# Patient Record
Sex: FEMALE | Race: WHITE | Employment: UNEMPLOYED | URBAN - METROPOLITAN AREA
[De-identification: names, ages, dates, MRNs, and addresses within clinical notes are randomized per-mention and may not be internally consistent; named-entity substitution may affect disease eponyms.]

---

## 2020-05-27 LAB — SARS-COV-2 RNA RESP QL NAA+PROBE: NOT DETECTED

## 2020-05-28 ENCOUNTER — HISTORICAL (OUTPATIENT)
Dept: ADMINISTRATIVE | Facility: HOSPITAL | Age: 35
End: 2020-05-28

## 2020-05-28 LAB
ABS NEUT (OLG): 1.16 X10(3)/MCL (ref 2.1–9.2)
ALBUMIN SERPL-MCNC: 3.3 GM/DL (ref 3.5–5)
ALBUMIN/GLOB SERPL: 1.1 RATIO (ref 1.1–2)
ALP SERPL-CCNC: 105 UNIT/L (ref 40–150)
ALT SERPL-CCNC: 9 UNIT/L (ref 0–55)
ANISOCYTOSIS BLD QL SMEAR: 2
APTT PPP: 30.5 SECOND(S) (ref 23.2–33.7)
AST SERPL-CCNC: 17 UNIT/L (ref 5–34)
BILIRUB SERPL-MCNC: 0.2 MG/DL
BILIRUBIN DIRECT+TOT PNL SERPL-MCNC: 0 MG/DL (ref 0–0.8)
BILIRUBIN DIRECT+TOT PNL SERPL-MCNC: 0.2 MG/DL (ref 0–0.5)
BUN SERPL-MCNC: 4.7 MG/DL (ref 7–18.7)
CALCIUM SERPL-MCNC: 8.4 MG/DL (ref 8.4–10.2)
CHLORIDE SERPL-SCNC: 102 MMOL/L (ref 98–107)
CO2 SERPL-SCNC: 29 MMOL/L (ref 22–29)
CREAT SERPL-MCNC: 0.53 MG/DL (ref 0.55–1.02)
ERYTHROCYTE [DISTWIDTH] IN BLOOD BY AUTOMATED COUNT: 11.1 % (ref 11.5–17)
GLOBULIN SER-MCNC: 3 GM/DL (ref 2.4–3.5)
GLUCOSE SERPL-MCNC: 81 MG/DL (ref 74–100)
HCT VFR BLD AUTO: 37.7 % (ref 37–47)
HGB BLD-MCNC: 11.8 GM/DL (ref 12–16)
INR PPP: 1 (ref 0–1.3)
LYMPHOCYTES NFR BLD MANUAL: 57 % (ref 13–40)
MACROCYTES BLD QL SMEAR: 2 /MCL
MCH RBC QN AUTO: 34.9 PG (ref 27–31)
MCHC RBC AUTO-ENTMCNC: 31.3 GM/DL (ref 33–36)
MCV RBC AUTO: 111.5 FL (ref 80–94)
MONOCYTES NFR BLD MANUAL: 6 % (ref 2–11)
NEUTROPHILS NFR BLD MANUAL: 37 % (ref 47–80)
PLATELET # BLD AUTO: 189 X10(3)/MCL (ref 130–400)
PLATELET # BLD EST: NORMAL 10*3/UL
PMV BLD AUTO: 9.3 FL (ref 7.4–10.4)
POTASSIUM SERPL-SCNC: 3.4 MMOL/L (ref 3.5–5.1)
PROT SERPL-MCNC: 6.3 GM/DL (ref 6.4–8.3)
PROTHROMBIN TIME: 12.6 SECOND(S) (ref 11.1–13.7)
RBC # BLD AUTO: 3.38 X10(6)/MCL (ref 4.2–5.4)
SODIUM SERPL-SCNC: 137 MMOL/L (ref 136–145)
WBC # SPEC AUTO: 3.6 X10(3)/MCL (ref 4.5–11.5)

## 2022-03-28 ENCOUNTER — HOSPITAL ENCOUNTER (INPATIENT)
Facility: HOSPITAL | Age: 37
LOS: 13 days | Discharge: HOME | DRG: 441 | End: 2022-04-10
Attending: INTERNAL MEDICINE | Admitting: STUDENT IN AN ORGANIZED HEALTH CARE EDUCATION/TRAINING PROGRAM
Payer: MEDICAID

## 2022-03-28 DIAGNOSIS — K72.00 ACUTE LIVER FAILURE: ICD-10-CM

## 2022-03-28 DIAGNOSIS — J96.01 ACUTE HYPOXEMIC RESPIRATORY FAILURE: ICD-10-CM

## 2022-03-28 DIAGNOSIS — R10.84 GENERALIZED ABDOMINAL PAIN: ICD-10-CM

## 2022-03-28 DIAGNOSIS — F11.20 OPIOID USE DISORDER, SEVERE, ON MAINTENANCE THERAPY: Chronic | ICD-10-CM

## 2022-03-28 DIAGNOSIS — G93.41 ENCEPHALOPATHY, METABOLIC: ICD-10-CM

## 2022-03-28 DIAGNOSIS — I50.41 ACUTE COMBINED SYSTOLIC AND DIASTOLIC CONGESTIVE HEART FAILURE: ICD-10-CM

## 2022-03-28 DIAGNOSIS — F19.10 POLYSUBSTANCE ABUSE: Chronic | ICD-10-CM

## 2022-03-28 DIAGNOSIS — K72.00 ACUTE LIVER FAILURE WITHOUT HEPATIC COMA: ICD-10-CM

## 2022-03-28 DIAGNOSIS — R00.0 SINUS TACHYCARDIA: ICD-10-CM

## 2022-03-28 DIAGNOSIS — S36.119A HEPATIC TRAUMA, INITIAL ENCOUNTER: ICD-10-CM

## 2022-03-28 DIAGNOSIS — I50.9 CONGESTIVE HEART FAILURE, UNSPECIFIED HF CHRONICITY, UNSPECIFIED HEART FAILURE TYPE: ICD-10-CM

## 2022-03-28 DIAGNOSIS — Z91.89 AT RISK FOR PROLONGED QT INTERVAL SYNDROME: ICD-10-CM

## 2022-03-28 DIAGNOSIS — K86.1 CHRONIC BILIARY PANCREATITIS: ICD-10-CM

## 2022-03-28 DIAGNOSIS — R62.7 FAILURE TO THRIVE IN ADULT: ICD-10-CM

## 2022-03-28 DIAGNOSIS — S36.119S: ICD-10-CM

## 2022-03-28 DIAGNOSIS — I50.9 CHF (CONGESTIVE HEART FAILURE): ICD-10-CM

## 2022-03-28 PROBLEM — F11.10 OPIOID ABUSE: Status: ACTIVE | Noted: 2022-03-28

## 2022-03-28 PROBLEM — I47.9 PAROXYSMAL TACHYCARDIA: Status: ACTIVE | Noted: 2022-03-28

## 2022-03-28 PROBLEM — F11.10 OPIOID ABUSE: Chronic | Status: ACTIVE | Noted: 2022-03-28

## 2022-03-28 PROBLEM — K56.609 SBO (SMALL BOWEL OBSTRUCTION): Status: ACTIVE | Noted: 2022-03-28

## 2022-03-28 LAB
ALBUMIN SERPL BCP-MCNC: 3.1 G/DL (ref 3.5–5.2)
ALP SERPL-CCNC: 882 U/L (ref 55–135)
ALT SERPL W/O P-5'-P-CCNC: 71 U/L (ref 10–44)
AMMONIA PLAS-SCNC: 51 UMOL/L (ref 10–50)
AMPHET+METHAMPHET UR QL: NEGATIVE
ANION GAP SERPL CALC-SCNC: 9 MMOL/L (ref 8–16)
APAP SERPL-MCNC: <3 UG/ML (ref 10–20)
AST SERPL-CCNC: 102 U/L (ref 10–40)
B-HCG UR QL: NEGATIVE
BARBITURATES UR QL SCN>200 NG/ML: NEGATIVE
BASOPHILS # BLD AUTO: 0.02 K/UL (ref 0–0.2)
BASOPHILS NFR BLD: 0.2 % (ref 0–1.9)
BENZODIAZ UR QL SCN>200 NG/ML: NEGATIVE
BILIRUB SERPL-MCNC: 9.3 MG/DL (ref 0.1–1)
BNP SERPL-MCNC: 1251 PG/ML (ref 0–99)
BUN SERPL-MCNC: 11 MG/DL (ref 6–20)
BZE UR QL SCN: NEGATIVE
CALCIUM SERPL-MCNC: 8.8 MG/DL (ref 8.7–10.5)
CANNABINOIDS UR QL SCN: NEGATIVE
CHLORIDE SERPL-SCNC: 102 MMOL/L (ref 95–110)
CO2 SERPL-SCNC: 27 MMOL/L (ref 23–29)
CREAT SERPL-MCNC: 0.4 MG/DL (ref 0.5–1.4)
CREAT UR-MCNC: 25 MG/DL (ref 15–325)
DIFFERENTIAL METHOD: ABNORMAL
EOSINOPHIL # BLD AUTO: 0 K/UL (ref 0–0.5)
EOSINOPHIL NFR BLD: 0 % (ref 0–8)
ERYTHROCYTE [DISTWIDTH] IN BLOOD BY AUTOMATED COUNT: 27.6 % (ref 11.5–14.5)
EST. GFR  (AFRICAN AMERICAN): >60 ML/MIN/1.73 M^2
EST. GFR  (NON AFRICAN AMERICAN): >60 ML/MIN/1.73 M^2
ETHANOL UR-MCNC: <10 MG/DL
GLUCOSE SERPL-MCNC: 108 MG/DL (ref 70–110)
HCT VFR BLD AUTO: 25.3 % (ref 37–48.5)
HGB BLD-MCNC: 8.2 G/DL (ref 12–16)
IMM GRANULOCYTES # BLD AUTO: 0.33 K/UL (ref 0–0.04)
IMM GRANULOCYTES NFR BLD AUTO: 3.6 % (ref 0–0.5)
INR PPP: 1.4 (ref 0.8–1.2)
LACTATE SERPL-SCNC: 1 MMOL/L (ref 0.5–2.2)
LYMPHOCYTES # BLD AUTO: 1.3 K/UL (ref 1–4.8)
LYMPHOCYTES NFR BLD: 14.3 % (ref 18–48)
MAGNESIUM SERPL-MCNC: 1.7 MG/DL (ref 1.6–2.6)
MCH RBC QN AUTO: 34.2 PG (ref 27–31)
MCHC RBC AUTO-ENTMCNC: 32.4 G/DL (ref 32–36)
MCV RBC AUTO: 105 FL (ref 82–98)
METHADONE UR QL SCN>300 NG/ML: NEGATIVE
MONOCYTES # BLD AUTO: 0.5 K/UL (ref 0.3–1)
MONOCYTES NFR BLD: 5.6 % (ref 4–15)
NEUTROPHILS # BLD AUTO: 7.1 K/UL (ref 1.8–7.7)
NEUTROPHILS NFR BLD: 76.3 % (ref 38–73)
NRBC BLD-RTO: 0 /100 WBC
OPIATES UR QL SCN: NEGATIVE
PCP UR QL SCN>25 NG/ML: NEGATIVE
PHOSPHATE SERPL-MCNC: 3.7 MG/DL (ref 2.7–4.5)
PLATELET # BLD AUTO: 135 K/UL (ref 150–450)
PMV BLD AUTO: 12.3 FL (ref 9.2–12.9)
POCT GLUCOSE: 139 MG/DL (ref 70–110)
POTASSIUM SERPL-SCNC: 3.7 MMOL/L (ref 3.5–5.1)
PROT SERPL-MCNC: 5.3 G/DL (ref 6–8.4)
PROTHROMBIN TIME: 13.9 SEC (ref 9–12.5)
RBC # BLD AUTO: 2.4 M/UL (ref 4–5.4)
SODIUM SERPL-SCNC: 138 MMOL/L (ref 136–145)
TOXICOLOGY INFORMATION: NORMAL
WBC # BLD AUTO: 9.25 K/UL (ref 3.9–12.7)

## 2022-03-28 PROCEDURE — 81025 URINE PREGNANCY TEST: CPT

## 2022-03-28 PROCEDURE — 20000000 HC ICU ROOM

## 2022-03-28 PROCEDURE — 80307 DRUG TEST PRSMV CHEM ANLYZR: CPT

## 2022-03-28 PROCEDURE — 93010 ELECTROCARDIOGRAM REPORT: CPT | Mod: ,,, | Performed by: INTERNAL MEDICINE

## 2022-03-28 PROCEDURE — 86256 FLUORESCENT ANTIBODY TITER: CPT

## 2022-03-28 PROCEDURE — S4991 NICOTINE PATCH NONLEGEND: HCPCS

## 2022-03-28 PROCEDURE — 85610 PROTHROMBIN TIME: CPT

## 2022-03-28 PROCEDURE — 25000003 PHARM REV CODE 250

## 2022-03-28 PROCEDURE — 86038 ANTINUCLEAR ANTIBODIES: CPT

## 2022-03-28 PROCEDURE — 83735 ASSAY OF MAGNESIUM: CPT

## 2022-03-28 PROCEDURE — 93010 EKG 12-LEAD: ICD-10-PCS | Mod: ,,, | Performed by: INTERNAL MEDICINE

## 2022-03-28 PROCEDURE — 51702 INSERT TEMP BLADDER CATH: CPT

## 2022-03-28 PROCEDURE — 25000003 PHARM REV CODE 250: Performed by: STUDENT IN AN ORGANIZED HEALTH CARE EDUCATION/TRAINING PROGRAM

## 2022-03-28 PROCEDURE — 80143 DRUG ASSAY ACETAMINOPHEN: CPT

## 2022-03-28 PROCEDURE — 93005 ELECTROCARDIOGRAM TRACING: CPT

## 2022-03-28 PROCEDURE — 85025 COMPLETE CBC W/AUTO DIFF WBC: CPT

## 2022-03-28 PROCEDURE — 87389 HIV-1 AG W/HIV-1&-2 AB AG IA: CPT

## 2022-03-28 PROCEDURE — 80074 ACUTE HEPATITIS PANEL: CPT

## 2022-03-28 PROCEDURE — 83880 ASSAY OF NATRIURETIC PEPTIDE: CPT

## 2022-03-28 PROCEDURE — 82525 ASSAY OF COPPER: CPT

## 2022-03-28 PROCEDURE — 82390 ASSAY OF CERULOPLASMIN: CPT

## 2022-03-28 PROCEDURE — 82140 ASSAY OF AMMONIA: CPT

## 2022-03-28 PROCEDURE — 80053 COMPREHEN METABOLIC PANEL: CPT

## 2022-03-28 PROCEDURE — 84100 ASSAY OF PHOSPHORUS: CPT

## 2022-03-28 PROCEDURE — 83605 ASSAY OF LACTIC ACID: CPT

## 2022-03-28 RX ORDER — MUPIROCIN 20 MG/G
OINTMENT TOPICAL 2 TIMES DAILY
Status: COMPLETED | OUTPATIENT
Start: 2022-03-28 | End: 2022-04-02

## 2022-03-28 RX ORDER — LACTULOSE 10 G/15ML
20 SOLUTION ORAL 3 TIMES DAILY
Status: DISCONTINUED | OUTPATIENT
Start: 2022-03-28 | End: 2022-03-28

## 2022-03-28 RX ORDER — SODIUM CHLORIDE 0.9 % (FLUSH) 0.9 %
10 SYRINGE (ML) INJECTION
Status: DISCONTINUED | OUTPATIENT
Start: 2022-03-28 | End: 2022-04-10 | Stop reason: HOSPADM

## 2022-03-28 RX ORDER — ONDANSETRON 8 MG/1
8 TABLET, ORALLY DISINTEGRATING ORAL EVERY 8 HOURS PRN
Status: DISCONTINUED | OUTPATIENT
Start: 2022-03-28 | End: 2022-04-10 | Stop reason: HOSPADM

## 2022-03-28 RX ORDER — MAG HYDROX/ALUMINUM HYD/SIMETH 200-200-20
30 SUSPENSION, ORAL (FINAL DOSE FORM) ORAL
Status: DISCONTINUED | OUTPATIENT
Start: 2022-03-28 | End: 2022-03-31

## 2022-03-28 RX ORDER — FUROSEMIDE 10 MG/ML
40 INJECTION INTRAMUSCULAR; INTRAVENOUS DAILY
Status: DISCONTINUED | OUTPATIENT
Start: 2022-03-29 | End: 2022-03-29

## 2022-03-28 RX ORDER — LORAZEPAM 0.5 MG/1
0.5 TABLET ORAL ONCE
Status: COMPLETED | OUTPATIENT
Start: 2022-03-28 | End: 2022-03-28

## 2022-03-28 RX ORDER — LACTULOSE 10 G/15ML
20 SOLUTION ORAL 2 TIMES DAILY
Status: DISCONTINUED | OUTPATIENT
Start: 2022-03-28 | End: 2022-03-29

## 2022-03-28 RX ORDER — IBUPROFEN 200 MG
1 TABLET ORAL DAILY
Status: DISCONTINUED | OUTPATIENT
Start: 2022-03-28 | End: 2022-04-10 | Stop reason: HOSPADM

## 2022-03-28 RX ORDER — IBUPROFEN 200 MG
1 TABLET ORAL DAILY
Status: DISCONTINUED | OUTPATIENT
Start: 2022-03-29 | End: 2022-03-28

## 2022-03-28 RX ORDER — TALC
6 POWDER (GRAM) TOPICAL NIGHTLY PRN
Status: DISCONTINUED | OUTPATIENT
Start: 2022-03-28 | End: 2022-04-10 | Stop reason: HOSPADM

## 2022-03-28 RX ORDER — FAMOTIDINE 20 MG/1
20 TABLET, FILM COATED ORAL 2 TIMES DAILY
Status: DISCONTINUED | OUTPATIENT
Start: 2022-03-28 | End: 2022-03-29

## 2022-03-28 RX ADMIN — LORAZEPAM 0.5 MG: 0.5 TABLET ORAL at 08:03

## 2022-03-28 RX ADMIN — MUPIROCIN: 20 OINTMENT TOPICAL at 08:03

## 2022-03-28 RX ADMIN — ALUMINUM HYDROXIDE, MAGNESIUM HYDROXIDE, AND SIMETHICONE 30 ML: 200; 200; 20 SUSPENSION ORAL at 08:03

## 2022-03-28 RX ADMIN — FAMOTIDINE 20 MG: 20 TABLET ORAL at 08:03

## 2022-03-28 RX ADMIN — RIFAXIMIN 550 MG: 550 TABLET ORAL at 08:03

## 2022-03-28 RX ADMIN — LACTULOSE 20 G: 20 SOLUTION ORAL at 08:03

## 2022-03-28 RX ADMIN — NICOTINE 1 PATCH: 14 PATCH, EXTENDED RELEASE TRANSDERMAL at 08:03

## 2022-03-28 NOTE — PROVIDER TRANSFER
Outside Transfer Acceptance Note / Regional Referral Center    Referring facility: [facility]   Referring provider: VIRGINIA ARITA  Accepting facility: [facility]  Accepting provider: Nnamdi Marsh MD ()   Admitting [facility]  Reason for transfer: Higher Level of Care   Transfer diagnosis: Liver failure, FTT, PNA, SBO, Enterocolitis, Esoph Yeast infx, Pancytopenia  Transfer specialty requested: Hepatology  Transfer specialty notified: yes  Transfer level: NUMBER 1-5: 1  Bed type requested: MICU  Isolation status: No active isolations   Admission class or status: IP- Inpatient      Narrative       Carolnia Baldwin is a 36 y.o. female who has hx of IVDU (on Suboxone), presented to the [facility] ED on 3/19 with generalized abdominal pain and FTT thrive at 61 lbs (BMI 15). She is confused, and was having hallucinations. Unable to answer appropriately.  HR was 121 /53 and RR 24. Afebrile. Utox was negative. Tbili 8.3. Alk Ph 653. Ammonia 83. AST/ALT wnl. Lipase wnl. Cr <0.2 (low muscle mass). WBC# < 2.4 on admission, now 7. Hb 7.6. Platelets 45 on admission, now 111. INR 2.4 on admit, FFP and Vit K, 1.4 now. Occult blood positive. Pancytopenia initially with WBC# < 2.5. GI scope needed. Yeast in throat. CT showing small loops dilated with max 4.8 cm, SBO vs illeus. + Enterocolitis. NGT placed Moderate fluid in pelvis. PNA appreciated on lower lobe. PNA: Xray with severe b/l infiltrates. MRCP with SBO, Ascites, marked esophageal and gastric wall thickening. Had cholecystectomy 4 weeks ago or so. Path review: Mod hyperchromic/macrocytic anemia with anisopoikilocytosis. Rare schistocytes, no atypical WBC or blasts. Moderate thrombocytopenia and leukopenia. Folate deficiency. ECHO showing patient has moderate to mild tricuspid valve regurgitation with severe pulmonary hypertension, spoke with hepatology will see for medical management.      Since patient has no known liver failure (New) and is acutely decompensating, INR >1.5, and encephalopathic with ammonia 83, the patient is in liver failure. Although, may have severe intra-abdominal infection associated with recent cholecystectomy. Has FTT with BMI 15, PNA, Pancytopenia, Esophageal/Gastic Yeast Infection, SBO, Enterocolitis, and Severe Pulmonary HTN. HIV was negative as well as urine tox screen- concern for immunocompromised state/malignancy.     Given complexity and fragility, will transfer to MICU at [facility]    Instructions      - Notify MICU of arrival  Consider:  - Hepatology consult  - ID consult  - GI consult  - Hem consult      Objective       Allergies: PEN and ASA    Anticoagulation:   Anticoagulants     None

## 2022-03-28 NOTE — NURSING
Patient arrived to CMICU room 6068 from OSH via stretcher with EMS. Patient on 4L O2 via NC. D5W gtt infusing. Patient tachycardic on arrival. Primary team notified of patient arrival. Patient oriented to room, bed in low position, call light in reach.

## 2022-03-29 ENCOUNTER — RESEARCH ENCOUNTER (OUTPATIENT)
Dept: RESEARCH | Facility: HOSPITAL | Age: 37
End: 2022-03-29
Payer: MEDICAID

## 2022-03-29 PROBLEM — F11.20 OPIOID USE DISORDER, SEVERE, ON MAINTENANCE THERAPY: Chronic | Status: ACTIVE | Noted: 2022-03-28

## 2022-03-29 PROBLEM — J90 BILATERAL PLEURAL EFFUSION: Status: ACTIVE | Noted: 2022-03-29

## 2022-03-29 LAB
ALBUMIN FLD-MCNC: 1.8 G/DL
ALBUMIN SERPL BCP-MCNC: 3.2 G/DL (ref 3.5–5.2)
ALP SERPL-CCNC: 949 U/L (ref 55–135)
ALT SERPL W/O P-5'-P-CCNC: 79 U/L (ref 10–44)
ANA SER QL IF: NORMAL
ANION GAP SERPL CALC-SCNC: 10 MMOL/L (ref 8–16)
APPEARANCE FLD: CLEAR
ASCENDING AORTA: 2.42 CM
AST SERPL-CCNC: 106 U/L (ref 10–40)
AV INDEX (PROSTH): 0.84
AV MEAN GRADIENT: 3 MMHG
AV PEAK GRADIENT: 4 MMHG
AV VALVE AREA: 2.58 CM2
AV VELOCITY RATIO: 0.88
BASOPHILS # BLD AUTO: 0.02 K/UL (ref 0–0.2)
BASOPHILS NFR BLD: 0.2 % (ref 0–1.9)
BILIRUB SERPL-MCNC: 9.3 MG/DL (ref 0.1–1)
BODY FLD TYPE: NORMAL
BODY FLUID SOURCE, LDH: NORMAL
BSA FOR ECHO PROCEDURE: 1.19 M2
BUN SERPL-MCNC: 11 MG/DL (ref 6–20)
CALCIUM SERPL-MCNC: 9.4 MG/DL (ref 8.7–10.5)
CERULOPLASMIN SERPL-MCNC: 19 MG/DL (ref 15–45)
CHLORIDE SERPL-SCNC: 100 MMOL/L (ref 95–110)
CO2 SERPL-SCNC: 29 MMOL/L (ref 23–29)
COLOR FLD: YELLOW
CREAT SERPL-MCNC: 0.4 MG/DL (ref 0.5–1.4)
CV ECHO LV RWT: 0.45 CM
DIFFERENTIAL METHOD: ABNORMAL
DOP CALC AO PEAK VEL: 0.99 M/S
DOP CALC AO VTI: 14.15 CM
DOP CALC LVOT AREA: 3.1 CM2
DOP CALC LVOT DIAMETER: 1.98 CM
DOP CALC LVOT PEAK VEL: 0.87 M/S
DOP CALC LVOT STROKE VOLUME: 36.47 CM3
DOP CALCLVOT PEAK VEL VTI: 11.85 CM
ECHO LV POSTERIOR WALL: 0.9 CM (ref 0.6–1.1)
EJECTION FRACTION: 35 %
EOSINOPHIL # BLD AUTO: 0 K/UL (ref 0–0.5)
EOSINOPHIL NFR BLD: 0.1 % (ref 0–8)
ERYTHROCYTE [DISTWIDTH] IN BLOOD BY AUTOMATED COUNT: 27.7 % (ref 11.5–14.5)
EST. GFR  (AFRICAN AMERICAN): >60 ML/MIN/1.73 M^2
EST. GFR  (NON AFRICAN AMERICAN): >60 ML/MIN/1.73 M^2
FRACTIONAL SHORTENING: 12 % (ref 28–44)
GGT SERPL-CCNC: 627 U/L (ref 8–55)
GLUCOSE SERPL-MCNC: 113 MG/DL (ref 70–110)
HAV IGM SERPL QL IA: NEGATIVE
HBV CORE IGM SERPL QL IA: NEGATIVE
HBV SURFACE AG SERPL QL IA: NEGATIVE
HCT VFR BLD AUTO: 27.9 % (ref 37–48.5)
HCV AB SERPL QL IA: NEGATIVE
HGB BLD-MCNC: 8.7 G/DL (ref 12–16)
HIV 1+2 AB+HIV1 P24 AG SERPL QL IA: NEGATIVE
IMM GRANULOCYTES # BLD AUTO: 0.26 K/UL (ref 0–0.04)
IMM GRANULOCYTES NFR BLD AUTO: 2.6 % (ref 0–0.5)
INR PPP: 1.2 (ref 0.8–1.2)
INTERVENTRICULAR SEPTUM: 0.84 CM (ref 0.6–1.1)
LA MAJOR: 3.86 CM
LA MINOR: 3.05 CM
LA WIDTH: 3.39 CM
LDH FLD L TO P-CCNC: 188 U/L
LEFT ATRIUM SIZE: 2.4 CM
LEFT ATRIUM VOLUME INDEX: 19.3 ML/M2
LEFT ATRIUM VOLUME: 23.57 CM3
LEFT INTERNAL DIMENSION IN SYSTOLE: 3.54 CM (ref 2.1–4)
LEFT VENTRICLE DIASTOLIC VOLUME INDEX: 58.17 ML/M2
LEFT VENTRICLE DIASTOLIC VOLUME: 70.97 ML
LEFT VENTRICLE MASS INDEX: 87 G/M2
LEFT VENTRICLE SYSTOLIC VOLUME INDEX: 42.9 ML/M2
LEFT VENTRICLE SYSTOLIC VOLUME: 52.3 ML
LEFT VENTRICULAR INTERNAL DIMENSION IN DIASTOLE: 4.02 CM (ref 3.5–6)
LEFT VENTRICULAR MASS: 105.55 G
LYMPHOCYTES # BLD AUTO: 1.3 K/UL (ref 1–4.8)
LYMPHOCYTES NFR BLD: 12.8 % (ref 18–48)
LYMPHOCYTES NFR FLD MANUAL: 4 %
MAGNESIUM SERPL-MCNC: 1.7 MG/DL (ref 1.6–2.6)
MCH RBC QN AUTO: 34.3 PG (ref 27–31)
MCHC RBC AUTO-ENTMCNC: 31.2 G/DL (ref 32–36)
MCV RBC AUTO: 110 FL (ref 82–98)
MESOTHL CELL NFR FLD MANUAL: 1 %
MONOCYTES # BLD AUTO: 0.6 K/UL (ref 0.3–1)
MONOCYTES NFR BLD: 5.9 % (ref 4–15)
MONOS+MACROS NFR FLD MANUAL: 71 %
NEUTROPHILS # BLD AUTO: 7.9 K/UL (ref 1.8–7.7)
NEUTROPHILS NFR BLD: 78.4 % (ref 38–73)
NEUTROPHILS NFR FLD MANUAL: 24 %
NRBC BLD-RTO: 0 /100 WBC
PHOSPHATE SERPL-MCNC: 4 MG/DL (ref 2.7–4.5)
PISA TR MAX VEL: 2.79 M/S
PLATELET # BLD AUTO: 158 K/UL (ref 150–450)
PMV BLD AUTO: 11.8 FL (ref 9.2–12.9)
POCT GLUCOSE: 250 MG/DL (ref 70–110)
POCT GLUCOSE: 257 MG/DL (ref 70–110)
POTASSIUM SERPL-SCNC: 3.6 MMOL/L (ref 3.5–5.1)
PROT FLD-MCNC: 2 G/DL
PROT SERPL-MCNC: 5.7 G/DL (ref 6–8.4)
PROTHROMBIN TIME: 12.6 SEC (ref 9–12.5)
RA MAJOR: 3.93 CM
RA PRESSURE: 3 MMHG
RA WIDTH: 2.46 CM
RBC # BLD AUTO: 2.54 M/UL (ref 4–5.4)
RIGHT VENTRICULAR END-DIASTOLIC DIMENSION: 2.16 CM
SINUS: 2.34 CM
SODIUM SERPL-SCNC: 139 MMOL/L (ref 136–145)
SPECIMEN SOURCE: NORMAL
SPECIMEN SOURCE: NORMAL
STJ: 2.54 CM
TDI LATERAL: 0.07 M/S
TR MAX PG: 31 MMHG
TRICUSPID ANNULAR PLANE SYSTOLIC EXCURSION: 1.41 CM
TV REST PULMONARY ARTERY PRESSURE: 34 MMHG
WBC # BLD AUTO: 10.08 K/UL (ref 3.9–12.7)
WBC # FLD: 247 /CU MM

## 2022-03-29 PROCEDURE — 99291 PR CRITICAL CARE, E/M 30-74 MINUTES: ICD-10-PCS | Mod: ,,, | Performed by: STUDENT IN AN ORGANIZED HEALTH CARE EDUCATION/TRAINING PROGRAM

## 2022-03-29 PROCEDURE — 83615 LACTATE (LD) (LDH) ENZYME: CPT

## 2022-03-29 PROCEDURE — 80053 COMPREHEN METABOLIC PANEL: CPT

## 2022-03-29 PROCEDURE — 25000003 PHARM REV CODE 250: Performed by: STUDENT IN AN ORGANIZED HEALTH CARE EDUCATION/TRAINING PROGRAM

## 2022-03-29 PROCEDURE — 82042 OTHER SOURCE ALBUMIN QUAN EA: CPT

## 2022-03-29 PROCEDURE — 99223 1ST HOSP IP/OBS HIGH 75: CPT | Mod: ,,, | Performed by: PSYCHIATRY & NEUROLOGY

## 2022-03-29 PROCEDURE — 87070 CULTURE OTHR SPECIMN AEROBIC: CPT

## 2022-03-29 PROCEDURE — 25000003 PHARM REV CODE 250

## 2022-03-29 PROCEDURE — 20000000 HC ICU ROOM

## 2022-03-29 PROCEDURE — 63600175 PHARM REV CODE 636 W HCPCS: Performed by: STUDENT IN AN ORGANIZED HEALTH CARE EDUCATION/TRAINING PROGRAM

## 2022-03-29 PROCEDURE — 99291 CRITICAL CARE FIRST HOUR: CPT | Mod: ,,, | Performed by: STUDENT IN AN ORGANIZED HEALTH CARE EDUCATION/TRAINING PROGRAM

## 2022-03-29 PROCEDURE — 97165 OT EVAL LOW COMPLEX 30 MIN: CPT

## 2022-03-29 PROCEDURE — 31720 CLEARANCE OF AIRWAYS: CPT

## 2022-03-29 PROCEDURE — 25500020 PHARM REV CODE 255: Performed by: STUDENT IN AN ORGANIZED HEALTH CARE EDUCATION/TRAINING PROGRAM

## 2022-03-29 PROCEDURE — 83735 ASSAY OF MAGNESIUM: CPT

## 2022-03-29 PROCEDURE — 94640 AIRWAY INHALATION TREATMENT: CPT

## 2022-03-29 PROCEDURE — 87040 BLOOD CULTURE FOR BACTERIA: CPT | Mod: 59

## 2022-03-29 PROCEDURE — 94668 MNPJ CHEST WALL SBSQ: CPT

## 2022-03-29 PROCEDURE — 97530 THERAPEUTIC ACTIVITIES: CPT

## 2022-03-29 PROCEDURE — 99223 PR INITIAL HOSPITAL CARE,LEVL III: ICD-10-PCS | Mod: ,,, | Performed by: INTERNAL MEDICINE

## 2022-03-29 PROCEDURE — 99223 1ST HOSP IP/OBS HIGH 75: CPT | Mod: ,,, | Performed by: INTERNAL MEDICINE

## 2022-03-29 PROCEDURE — 49083 ABD PARACENTESIS W/IMAGING: CPT | Performed by: STUDENT IN AN ORGANIZED HEALTH CARE EDUCATION/TRAINING PROGRAM

## 2022-03-29 PROCEDURE — 97161 PT EVAL LOW COMPLEX 20 MIN: CPT

## 2022-03-29 PROCEDURE — 99223 PR INITIAL HOSPITAL CARE,LEVL III: ICD-10-PCS | Mod: ,,, | Performed by: PSYCHIATRY & NEUROLOGY

## 2022-03-29 PROCEDURE — 25000242 PHARM REV CODE 250 ALT 637 W/ HCPCS: Performed by: STUDENT IN AN ORGANIZED HEALTH CARE EDUCATION/TRAINING PROGRAM

## 2022-03-29 PROCEDURE — 82977 ASSAY OF GGT: CPT | Performed by: STUDENT IN AN ORGANIZED HEALTH CARE EDUCATION/TRAINING PROGRAM

## 2022-03-29 PROCEDURE — 84157 ASSAY OF PROTEIN OTHER: CPT

## 2022-03-29 PROCEDURE — 97535 SELF CARE MNGMENT TRAINING: CPT

## 2022-03-29 PROCEDURE — 85025 COMPLETE CBC W/AUTO DIFF WBC: CPT

## 2022-03-29 PROCEDURE — 85610 PROTHROMBIN TIME: CPT | Performed by: STUDENT IN AN ORGANIZED HEALTH CARE EDUCATION/TRAINING PROGRAM

## 2022-03-29 PROCEDURE — 63600175 PHARM REV CODE 636 W HCPCS

## 2022-03-29 PROCEDURE — 36415 COLL VENOUS BLD VENIPUNCTURE: CPT

## 2022-03-29 PROCEDURE — 89051 BODY FLUID CELL COUNT: CPT

## 2022-03-29 PROCEDURE — 86235 NUCLEAR ANTIGEN ANTIBODY: CPT | Performed by: STUDENT IN AN ORGANIZED HEALTH CARE EDUCATION/TRAINING PROGRAM

## 2022-03-29 PROCEDURE — S4991 NICOTINE PATCH NONLEGEND: HCPCS

## 2022-03-29 PROCEDURE — C1751 CATH, INF, PER/CENT/MIDLINE: HCPCS

## 2022-03-29 PROCEDURE — 84100 ASSAY OF PHOSPHORUS: CPT

## 2022-03-29 PROCEDURE — 94667 MNPJ CHEST WALL 1ST: CPT

## 2022-03-29 PROCEDURE — 94761 N-INVAS EAR/PLS OXIMETRY MLT: CPT

## 2022-03-29 RX ORDER — LORAZEPAM 0.5 MG/1
0.5 TABLET ORAL 3 TIMES DAILY PRN
Status: DISCONTINUED | OUTPATIENT
Start: 2022-03-29 | End: 2022-03-29

## 2022-03-29 RX ORDER — IPRATROPIUM BROMIDE AND ALBUTEROL SULFATE 2.5; .5 MG/3ML; MG/3ML
3 SOLUTION RESPIRATORY (INHALATION) ONCE
Status: COMPLETED | OUTPATIENT
Start: 2022-03-29 | End: 2022-03-29

## 2022-03-29 RX ORDER — LORAZEPAM 0.5 MG/1
0.5 TABLET ORAL ONCE
Status: COMPLETED | OUTPATIENT
Start: 2022-03-29 | End: 2022-03-29

## 2022-03-29 RX ORDER — SODIUM CHLORIDE FOR INHALATION 3 %
4 VIAL, NEBULIZER (ML) INHALATION ONCE
Status: COMPLETED | OUTPATIENT
Start: 2022-03-29 | End: 2022-03-29

## 2022-03-29 RX ORDER — FUROSEMIDE 10 MG/ML
40 INJECTION INTRAMUSCULAR; INTRAVENOUS
Status: DISCONTINUED | OUTPATIENT
Start: 2022-03-29 | End: 2022-04-03

## 2022-03-29 RX ORDER — POTASSIUM CHLORIDE 14.9 MG/ML
20 INJECTION INTRAVENOUS ONCE
Status: COMPLETED | OUTPATIENT
Start: 2022-03-29 | End: 2022-03-29

## 2022-03-29 RX ORDER — ENOXAPARIN SODIUM 100 MG/ML
40 INJECTION SUBCUTANEOUS EVERY 24 HOURS
Status: DISCONTINUED | OUTPATIENT
Start: 2022-03-29 | End: 2022-03-29

## 2022-03-29 RX ORDER — POTASSIUM CHLORIDE 7.45 MG/ML
10 INJECTION INTRAVENOUS
Status: DISCONTINUED | OUTPATIENT
Start: 2022-03-29 | End: 2022-03-29

## 2022-03-29 RX ORDER — SODIUM CHLORIDE 9 MG/ML
INJECTION, SOLUTION INTRAVENOUS CONTINUOUS
Status: DISCONTINUED | OUTPATIENT
Start: 2022-03-29 | End: 2022-04-10 | Stop reason: HOSPADM

## 2022-03-29 RX ORDER — GUAIFENESIN 600 MG/1
600 TABLET, EXTENDED RELEASE ORAL 2 TIMES DAILY
Status: DISCONTINUED | OUTPATIENT
Start: 2022-03-29 | End: 2022-03-30

## 2022-03-29 RX ORDER — CEFEPIME HYDROCHLORIDE 1 G/50ML
1 INJECTION, SOLUTION INTRAVENOUS
Status: DISCONTINUED | OUTPATIENT
Start: 2022-03-29 | End: 2022-04-03

## 2022-03-29 RX ORDER — FAMOTIDINE 20 MG/1
20 TABLET, FILM COATED ORAL 2 TIMES DAILY
Status: DISCONTINUED | OUTPATIENT
Start: 2022-03-29 | End: 2022-04-04

## 2022-03-29 RX ORDER — ONDANSETRON 2 MG/ML
4 INJECTION INTRAMUSCULAR; INTRAVENOUS EVERY 6 HOURS PRN
Status: DISCONTINUED | OUTPATIENT
Start: 2022-03-29 | End: 2022-04-10 | Stop reason: HOSPADM

## 2022-03-29 RX ORDER — LIDOCAINE HYDROCHLORIDE 10 MG/ML
1 INJECTION INFILTRATION; PERINEURAL ONCE
Status: DISCONTINUED | OUTPATIENT
Start: 2022-03-29 | End: 2022-04-02

## 2022-03-29 RX ORDER — MAGNESIUM SULFATE HEPTAHYDRATE 40 MG/ML
2 INJECTION, SOLUTION INTRAVENOUS ONCE
Status: COMPLETED | OUTPATIENT
Start: 2022-03-29 | End: 2022-03-29

## 2022-03-29 RX ORDER — ENOXAPARIN SODIUM 100 MG/ML
30 INJECTION SUBCUTANEOUS EVERY 24 HOURS
Status: DISCONTINUED | OUTPATIENT
Start: 2022-03-29 | End: 2022-04-05

## 2022-03-29 RX ORDER — LORAZEPAM 0.5 MG/1
0.5 TABLET ORAL NIGHTLY PRN
Status: DISCONTINUED | OUTPATIENT
Start: 2022-03-30 | End: 2022-04-08

## 2022-03-29 RX ORDER — HYDROXYZINE HYDROCHLORIDE 25 MG/1
25 TABLET, FILM COATED ORAL 3 TIMES DAILY PRN
Status: DISCONTINUED | OUTPATIENT
Start: 2022-03-29 | End: 2022-04-09

## 2022-03-29 RX ORDER — AZITHROMYCIN 200 MG/5ML
330 POWDER, FOR SUSPENSION ORAL DAILY
Status: COMPLETED | OUTPATIENT
Start: 2022-03-29 | End: 2022-03-31

## 2022-03-29 RX ORDER — LACTULOSE 10 G/15ML
20 SOLUTION ORAL 2 TIMES DAILY
Status: DISCONTINUED | OUTPATIENT
Start: 2022-03-29 | End: 2022-04-04

## 2022-03-29 RX ADMIN — SODIUM CHLORIDE 500 ML: 0.9 INJECTION, SOLUTION INTRAVENOUS at 07:03

## 2022-03-29 RX ADMIN — ACETYLCYSTEINE 5000 MG: 200 INJECTION, SOLUTION INTRAVENOUS at 05:03

## 2022-03-29 RX ADMIN — ALUMINUM HYDROXIDE, MAGNESIUM HYDROXIDE, AND SIMETHICONE 30 ML: 200; 200; 20 SUSPENSION ORAL at 04:03

## 2022-03-29 RX ADMIN — ALUMINUM HYDROXIDE, MAGNESIUM HYDROXIDE, AND SIMETHICONE 30 ML: 200; 200; 20 SUSPENSION ORAL at 06:03

## 2022-03-29 RX ADMIN — IOHEXOL 30 ML: 350 INJECTION, SOLUTION INTRAVENOUS at 12:03

## 2022-03-29 RX ADMIN — ALUMINUM HYDROXIDE, MAGNESIUM HYDROXIDE, AND SIMETHICONE 30 ML: 200; 200; 20 SUSPENSION ORAL at 08:03

## 2022-03-29 RX ADMIN — SODIUM CHLORIDE: 0.9 INJECTION, SOLUTION INTRAVENOUS at 06:03

## 2022-03-29 RX ADMIN — FUROSEMIDE 40 MG: 10 INJECTION, SOLUTION INTRAMUSCULAR; INTRAVENOUS at 08:03

## 2022-03-29 RX ADMIN — FAMOTIDINE 20 MG: 20 TABLET ORAL at 08:03

## 2022-03-29 RX ADMIN — CEFEPIME HYDROCHLORIDE 1 G: 1 INJECTION, SOLUTION INTRAVENOUS at 09:03

## 2022-03-29 RX ADMIN — MAGNESIUM SULFATE 2 G: 2 INJECTION INTRAVENOUS at 06:03

## 2022-03-29 RX ADMIN — ENOXAPARIN SODIUM 30 MG: 30 INJECTION, SOLUTION INTRAVENOUS; SUBCUTANEOUS at 05:03

## 2022-03-29 RX ADMIN — AZITHROMYCIN 330 MG: 900 POWDER, FOR SUSPENSION ORAL at 11:03

## 2022-03-29 RX ADMIN — SODIUM CHLORIDE 30 MG/ML INHALATION SOLUTION 4 ML: 30 SOLUTION INHALANT at 08:03

## 2022-03-29 RX ADMIN — MUPIROCIN: 20 OINTMENT TOPICAL at 08:03

## 2022-03-29 RX ADMIN — LORAZEPAM 0.5 MG: 0.5 TABLET ORAL at 02:03

## 2022-03-29 RX ADMIN — IOHEXOL 100 ML: 350 INJECTION, SOLUTION INTRAVENOUS at 04:03

## 2022-03-29 RX ADMIN — RIFAXIMIN 550 MG: 550 TABLET ORAL at 08:03

## 2022-03-29 RX ADMIN — LACTULOSE 20 G: 20 SOLUTION ORAL at 08:03

## 2022-03-29 RX ADMIN — NICOTINE 1 PATCH: 14 PATCH, EXTENDED RELEASE TRANSDERMAL at 08:03

## 2022-03-29 RX ADMIN — ACETYLCYSTEINE 3400 MG: 200 INJECTION, SOLUTION INTRAVENOUS at 10:03

## 2022-03-29 RX ADMIN — ACETYLCYSTEINE 1700 MG: 200 INJECTION, SOLUTION INTRAVENOUS at 06:03

## 2022-03-29 RX ADMIN — ONDANSETRON 4 MG: 2 INJECTION INTRAMUSCULAR; INTRAVENOUS at 06:03

## 2022-03-29 RX ADMIN — POTASSIUM CHLORIDE 20 MEQ: 14.9 INJECTION, SOLUTION INTRAVENOUS at 08:03

## 2022-03-29 RX ADMIN — GUAIFENESIN 600 MG: 600 TABLET, EXTENDED RELEASE ORAL at 09:03

## 2022-03-29 RX ADMIN — FUROSEMIDE 40 MG: 10 INJECTION, SOLUTION INTRAMUSCULAR; INTRAVENOUS at 12:03

## 2022-03-29 RX ADMIN — Medication 6 MG: at 02:03

## 2022-03-29 RX ADMIN — FAMOTIDINE 20 MG: 20 TABLET ORAL at 09:03

## 2022-03-29 RX ADMIN — HYDROXYZINE HYDROCHLORIDE 25 MG: 25 TABLET ORAL at 02:03

## 2022-03-29 RX ADMIN — Medication 6 MG: at 08:03

## 2022-03-29 RX ADMIN — SODIUM CHLORIDE: 0.9 INJECTION, SOLUTION INTRAVENOUS at 09:03

## 2022-03-29 RX ADMIN — CEFEPIME HYDROCHLORIDE 1 G: 1 INJECTION, SOLUTION INTRAVENOUS at 11:03

## 2022-03-29 RX ADMIN — LORAZEPAM 0.5 MG: 0.5 TABLET ORAL at 08:03

## 2022-03-29 RX ADMIN — IPRATROPIUM BROMIDE AND ALBUTEROL SULFATE 3 ML: 2.5; .5 SOLUTION RESPIRATORY (INHALATION) at 08:03

## 2022-03-29 NOTE — ASSESSMENT & PLAN NOTE
Acute hepatic metabolic encephalopathy  Elevated transaminases  Elevated total bilirubin    37 y/o F with PMHx IVDU, polysubstance abuse presenting to [facility] as transfer from [facility] for hepatology evaluation. Initially presented with concerns for acute liver failure (encephalopathy, elevated elevated elevation of LFTs, jaundice) vs pSBO vs sepsis 2/2 abdominal infection. NGT decompression of pSBO with good response, advanced to CLD. Started on broad spectrum abx and tapered. Started on lactulose and rifaximin with good response of encephalopathy, now A&O x 4 without signs of asterixis, tongue fasciculations, hallucinations. Transferred to [facility] for further hepatological evaluation.    - Hepatology consulted and following; appreciate assistance  - Acute hepatitis panel pending  - HIV screen pending  - US liver doppler / abdomen did not demonstrate appreciable ascites or biliary duct dilatation  - Lactulose 20 BID (titrate to 4-5 BMs per day)  - Rifaximin  - Ceruloplasmin, Copper pending  - ELIN, Anti-Sm pending  - PT/INR daily  - Trend CMP daily  - Trend CBC daily  - Monitor neurological status

## 2022-03-29 NOTE — CONSULTS
[facility] - Cardiac Medical ICU  Psychiatry  Consult Note    Patient Name: Carolina Baldwin  MRN: [id number]   Code Status: Full Code  Admission Date: 3/28/2022  Hospital Length of Stay: 1 days  Attending Physician: Pawel Velez MD  Primary Care Provider: Primary Doctor No    Current Legal Status: Uncontested    Patient information was obtained from patient, ER records and primary team.   Inpatient consult to Psychiatry  Consult performed by: Shelby Alcala MD  Consult ordered by: Geoffrey Gloria MD        Subjective:     Principal Problem:Liver injury    Chief Complaint:  Liver injury     HPI:   3/28/2022 10:47 PM  Carolina Baldwin  1985  [id number]      ADDICTION CONSULT INITIAL EVALUATION     DEPARTMENT:  Psychiatry  SITE: [facility] [address]    DATE OF ADMISSION: 3/28/2022  6:40 PM  LENGTH OF STAY: 0 days    EXAMINING PRACTITIONER: Janice Dominique    CONSULT REQUESTED BY: Pawel Velez MD      SUBJECTIVE     CHIEF COMPLAINT  Carolina Baldwin is a 36 y.o. female who is seen today for an initial psychiatric evaluation by the addiction psychiatry consult service.  Carolina Baldwin presents as a transfer to [facility] for hepatology evaluation.      HISTORY OF PRESENT ILLNESS    Per Primary MD:  37 y/o F with PMHx IVDU on suboxone, polysubstance use (meth, opioids, tobacco), gastric ulcers initially presented to OSH for increasing jaundice, lethargy, AMS, evaluation for pSBO, increased O2 requirement (3-4 L, no baseline home O2). Was started on broad spectrum Abx at OSH and eventually discontinued. Initially presented oriented x 2 and severe jaundice, elevated ammonia, but normal LFTs? Started on lactulose and rifaximin. NGT decompression at OSH for SBO with good response, advanced to CLD. Transferred to [facility] for hepatology evaluation.      On presentation to [facility] MICU, patient is overtly jaundiced but with intact mentation (a&o x4). Complains of lower back pain that she experienced  from the long transport from OSH to [facility] [place]. Also complains of increasing hunger and thirst (good appetite). Questionable historian, but understands the circumstance, stating she knows she is having new liver problems and is here for the hepatology team to evaluate her. Patient reports having good bowel movements and appropriate urinary voids. Specifically denies chest pain, increased work of breathing, increasing/worsening abdominal pain, fever/chills, nausea, vomiting, constipation, diarrhea, dysuria, suprapubic pain, recent IVDU (currently on suboxone), hallucinations (auditory and visual), tremors, seizures, loss of consciousness, headache, sensory/strength changes, abnormal bleeding.     Patient is from [place], [place]. Lives with boyfriend and 5 children. Of note, she calls her boyfriend her  but is not legally ; her closest relative is her sister. Substantial IVDU history (meth), non-injectable opioid abuse. Recently stopped smoking history (smoked since 12 y/o). On suboxone per OSH report and per patient. Does not know if any active HIV, hepatitis infection. Family history of CAD, ACS, and cancer (unspecified). 37 y/o F with PMHx IVDU on suboxone, polysubstance use (meth, opioids, tobacco), gastric ulcers initially presented to OSH for increasing jaundice, lethargy, AMS, evaluation for pSBO, increased O2 requirement (3-4 L, no baseline home O2). Was started on broad spectrum Abx at OSH and eventually discontinued. Initially presented oriented x 2 and severe jaundice, elevated ammonia, but normal LFTs? Started on lactulose and rifaximin. NGT decompression at OSH for SBO with good response, advanced to CLD. Transferred to [facility] [place] for hepatology evaluation.      On presentation to [facility] [place] [facility] MICU, patient is overtly jaundiced but with intact mentation (a&o x4). Complains of lower back pain that she experienced from the long transport from OSH to [facility] [place]. Also complains of increasing hunger and thirst (good appetite).  Questionable historian, but understands the circumstance, stating she knows she is having new liver problems and is here for the hepatology team to evaluate her. Patient reports having good bowel movements and appropriate urinary voids. Specifically denies chest pain, increased work of breathing, increasing/worsening abdominal pain, fever/chills, nausea, vomiting, constipation, diarrhea, dysuria, suprapubic pain, recent IVDU (currently on suboxone), hallucinations (auditory and visual), tremors, seizures, loss of consciousness, headache, sensory/strength changes, abnormal bleeding.     Patient is from [place], [place]. Lives with boyfriend and 5 children. Of note, she calls her boyfriend her  but is not legally ; her closest relative is her sister. Substantial IVDU history (meth), non-injectable opioid abuse. Recently stopped smoking history (smoked since 12 y/o). On suboxone per OSH report and per patient. Does not know if any active HIV, hepatitis infection. Family history of CAD, ACS, and cancer (unspecified).      Per Addiction Psych MD:  On initial observation, Miss Baldwin appears cachetic and severely jaundiced with increased work of breathing. She does not show signs of acute withdrawal including tremulousness or diaphoresis. Pt endorses a history of IVDU and non-injectable opioid use but confirms that she had been taking Suboxone for the past 7 years up until her most recent hospitalization. Pt endorses experiencing nausea, headaches and diarrhea when coming off the Suboxone approximately 1 month ago. Pt is not interested in re-starting the Suboxone at this time.      Pt denies any recent drug use, SI/HI, AVH, or symptoms of depression. She endorses feeling anxious since being in the hospital. Pt has no other psychiatric hx of note. She endorses using tobacco but has not smoked since being hospitalized. She is currently unemployed and lives with her partner in [place], [place]. Pt has 5 children, the  youngest of which is 5 months old. Pt endorses that her sister is currently caring for the children in [place].     Review of : Pt has been receiving Suboxone 8-2mg since at least 10/2020.  No additional opioids or controlled substances noted.  She last filled suboxone on 3/3/22 and received a 9 day supply.      SUBSTANCE ABUSE HISTORY  Substance(s) of Choice: prior use of pain pills  Substances Used: methamphetamine, opioids  History of IVDU?: yes  Use of Alcohol: denies  Average Consumption: n/a  Last Drink: n/a  Use of Medications for Alcohol/Opioid Use Disorder: Suboxone   History of Complicated Withdrawal: denies  History of Detox: in hospital  Rehab History: denies  AA/NA involvement: denies  Tobacco: yes  Spouse/Partner Consumption: yes  Patient Aware of Biomedical Complications: yes    DSM-5 SUBSTANCE USE DISORDER CRITERIA   Mild (1-3), Moderate (4-5), Severe (?6)  1. Often take in larger amounts or over a longer period of time than was intended.  2. Persistent desire or unsuccessful efforts to cut down or control use.  3. Great deal of time spent in activities necessary to obtain substance, use, or recover from effects.  4. Craving/strong desire for substance or urge to use.  5. Use resulting in failure to fulfill major role obligations at home, work or school.  6. Social, occupational, recreational activities decreased because of use.  7. Continued use despite having persistent or recurrent social or interpersonal problems cause or exaserbated by the substance.  8. Recurrent use in situations in which it is physically hazardous.  9. Use despite physical or psychological problems that are likely to have been caused or exacerbated by the substance.  10. Tolerance, as defined by either of the following.   A. A need for markedly increased amounts of substance to achieve intoxication or desired effect. -OR-    B. A markedly diminished effect with continued use of the same amount of substance.  11. Withdrawal,  as manifested by the following.   A. The characteristic withdrawal syndrome for substance. -AND-   B. Substance is taken to relieve or avoid withdrawal symptoms.    ARE THE CRITERIA MET FOR DSM-5 SUBSTANCE USE DISORDER: previously severe      PSYCHIATRIC HISTORY  Reported Diagnose(s): hx opioid use disorder  Previous Medication Trials: suboxone  Previous Psychiatric Hospitalizations: denies  Outpatient Psychiatrist?: denies      SUICIDE/HOMICIDE RISK ASSESSMENT  Current/active suicidal ideation/plan/intent: denies  Previous suicide attempts: denies  Current/active homicidal ideation/plan/intent: denies      HISTORY OF TRAUMA, ABUSE & VIOLENCE  Trauma: attempted, unable to assess at this time  Physical Abuse: attempted, unable to assess at this time  Sexual Abuse: attempted, unable to assess at this time  Violent Conduct: attempted, unable to assess at this time    Access to Gun: attempted, unable to assess at this time      FAMILY PSYCHIATRIC HISTORY   Denies    SOCIAL HISTORY  Lives with boyfriend and 5 children      PAST MEDICAL HISTORY   - IVDU (meth)  - Opioid use  - Polysubstance abuse  - Tobacco abuse disorder   - Acute hypoxemic respiratory failure  - Paroxysmal tachycardia  - SBO  - Cholecystectomy  - Liver failure, acute hepatic metabolic encephalopathy   - Failure to thrive (temporal wasting, malnutrition)      PSYCHOSOCIAL FACTORS  Stressors (Psychosocial and Environmental): health.       PSYCHIATRIC ROS  Denies withdrawal symptoms, denies SI/HI/AVH    MEDICAL ROS    Complete review of systems performed covering Constitutional, Eyes, ENT/Mouth, Cardiovascular, Respiratory, Gastrointestinal, Genitourinary, Musculoskeletal, Skin, Neurologic, Endocrine, Heme/Lymph, and Allergy/Immune.     Complete review of systems was negative with the exception of the following positive symptoms: fatigue, weakness      ALLERGIES  Penicillins      MEDICATIONS    Psychotropics:  none    Infusions:      Scheduled:    aluminum-magnesium hydroxide-simethicone  30 mL Oral QID (AC & HS)    famotidine  20 mg Oral BID    lactulose  20 g Oral BID    mupirocin   Nasal BID    nicotine  1 patch Transdermal Daily    rifAXImin  550 mg Oral BID       PRN:  melatonin, ondansetron, sodium chloride 0.9%    Home Medications:  Prior to Admission medications    Not on File      Patient seen by resident and medical student, below is medical student's documentation with additions and edits made where appropriate.          Hospital Course: No notes on file    No new subjective & objective note has been filed under this hospital service since the last note was generated.    Assessment/Plan:     Opioid use disorder, severe, on maintenance therapy    ASSESSMENT:     DIAGNOSES & PROBLEMS  History of opioid use disorder, severe, previously on maintenance therapy      STRENGTHS AND LIABILITIES   Strength: Patient accepts guidance/feedback, Liability: Patient has poor health.      MOTIVATION TO PURSUE RECOVERY: N/a    ACCEPTANCE OF ADDICTION: good    ABILITY TO ADHERE TO TREATMENT PLAN: Good      PLAN:       MANAGEMENT PLAN, TREATMENT GOALS, THERAPEUTIC TECHNIQUES/APPROACHES & CLINICAL REASONING  Patient does not wish to restart suboxone at this time.      · Patient counseled on abstinence from alcohol and substances of abuse (illicit and prescription).  · Additional workup planned to address substance use disorder, in order to guide and refine ongoing management options, includes serial alcohol and drug laboratory testing (e.g. PETH, urine toxicology).  · Relapse prevention and motivational interviewing provided.  · Education provided on 12 step recovery programs.      PRESCRIPTION DRUG MANAGEMENT  - The risks and benefits of medication were discussed with this patient.  - Possible expectable adverse effects of any current or proposed individual psychotropic agents were discussed with this patient.  - Counseling was provided on the importance of full  compliance with medication regimens.      In cases of emergency, daily coverage provided by Acute/ER Psych MD, NP, or SW, with contact numbers located in [facility] On Call Schedule    Addiction Psychiatry will sign off, please call with additional questions.    Case discussed with staff addiction psychiatrist: JEFFREY DHILLON MD         Total Time:  60 minutes      Shelby Alcala MD   Psychiatry  [facility] - Cardiac Medical ICU

## 2022-03-29 NOTE — ASSESSMENT & PLAN NOTE
H/o pSBO  H/o laparoscopic cholecystectomy    Patient with recent h/o pSBO at OSH,with good response to NGT decompression. Advanced to CLD. Initially complaining of RUQ abdominal pain at OSH on initial presentation. Now with mild, diffuse abdominal pain on palpation, but none at rest. Abdomen remains distended. Hypoactive bowel sounds.    - Continue CLD; ADAT - if evidence of SBO, NPO, general surgery consult  - Repeat KUB  - US abdomen, monitor for ascites  - Serial abdominal exams

## 2022-03-29 NOTE — CONSULTS
[facility] - Cardiac Medical ICU  General Surgery  Consult Note    Patient Name: Carolina Baldwin  MRN: [id number]  Code Status: Full Code  Admission Date: 3/28/2022  Hospital Length of Stay: 1 days  Attending Physician: Pawel Velez MD  Primary Care Provider: Primary Doctor No    Patient information was obtained from patient, past medical records and ER records.     Inpatient consult to General Surgery  Consult performed by: Sandy Gregory MD  Consult ordered by: Melissa Yu NP        Subjective:     Principal Problem: Liver injury    History of Present Illness: Patient is a 37yo female with a medical history significant for IVDU on suboxone, polysubstance use (meth, opioids, tobacco) and gastric ulcers s/p recent cholecystectomy at OSH on 3/8 who presented to [facility] [place] to MICU for hepatology evaluation given increasing jaundice, lethargy, AM and increased oxygen requirement. She was initially admitted to an OSH given these complaints and was found to have an elevated t bili and ammonia but reportedly normal LFTs. She was stared on lactulose and rifaximin. Additionally, there was a concern for partial SBO so an NGT was placed. Per notes, she progressed well from an obstructive standpoint and was passing flatus and having bowel movements per the patient. Notes also support that she was tolerating a clear diet. She is hungry and denies any nausea. No fevers. Tachycardic in the 110s, normal WBC, t bili 9.3. LFTs mildly elevated. CT scan demonstrated dilated loops of bowel and intra-abdomina fluid. General surgery consulted for evaluation.    Specifically denies chest pain, increased work of breathing, increasing/worsening abdominal pain, fever/chills, nausea, vomiting, constipation, diarrhea, dysuria, suprapubic pain, recent IVDU (currently on suboxone), hallucinations (auditory and visual), tremors, seizures, loss of consciousness, headache, sensory/strength changes, abnormal bleeding.  Substantial IVDU  history (meth), non-injectable opioid abuse. Recently stopped smoking history (smoked since 12 y/o). On suboxone per OSH report and per patient.   Surgical history notable for recent cholecystectomy and C-sections in the past (last was September per her report).      No current facility-administered medications on file prior to encounter.     No current outpatient medications on file prior to encounter.       Review of patient's allergies indicates:   Allergen Reactions    Penicillins Shortness Of Breath and Swelling       Past Medical History:   Diagnosis Date    IVDU (intravenous drug user)     Opioid use     Polysubstance abuse     Tobacco abuse disorder      Past Surgical History:   Procedure Laterality Date     SECTION      LAPAROSCOPIC CHOLECYSTECTOMY       Family History       Family history is unknown by patient.          Tobacco Use    Smoking status: Former Smoker     Types: Cigarettes    Smokeless tobacco: Never Used    Tobacco comment: Smoking since 12 y/o, recently quit   Substance and Sexual Activity    Alcohol use: Not Currently    Drug use: Not Currently     Types: Methamphetamines    Sexual activity: Not on file     Review of Systems   Constitutional:  Positive for activity change, appetite change and fatigue. Negative for chills, diaphoresis and fever.   HENT:  Positive for dental problem (all teeth removed previously d/t poor dentition). Negative for sneezing and sore throat.    Eyes:  Negative for pain and visual disturbance.   Respiratory:  Positive for shortness of breath. Negative for cough, chest tightness and wheezing.    Cardiovascular:  Negative for chest pain, palpitations and leg swelling.   Gastrointestinal:  Positive for abdominal distention. Negative for abdominal pain, blood in stool, constipation, diarrhea, nausea and vomiting.   Genitourinary:  Negative for dysuria, hematuria and urgency.   Musculoskeletal:  Negative for arthralgias and myalgias.   Skin:   Positive for color change (jaundice). Negative for rash.   Neurological:  Positive for weakness. Negative for tremors, syncope, light-headedness and numbness.   Psychiatric/Behavioral:  Negative for agitation and confusion.    Objective:     Vital Signs (Most Recent):  Temp: 97.9 °F (36.6 °C) (03/29/22 0705)  Pulse: (!) 116 (03/29/22 0812)  Resp: (!) 29 (03/29/22 0812)  BP: (!) 131/97 (03/29/22 0705)  SpO2: 96 % (03/29/22 0812)   Vital Signs (24h Range):  Temp:  [97.9 °F (36.6 °C)-99.9 °F (37.7 °C)] 97.9 °F (36.6 °C)  Pulse:  [110-125] 116  Resp:  [25-37] 29  SpO2:  [91 %-99 %] 96 %  BP: (110-132)/(77-97) 131/97     Weight: 33.5 kg (73 lb 13.7 oz)  There is no height or weight on file to calculate BMI.    Physical Exam  Constitutional:       General: She is not in acute distress.     Appearance: She is ill-appearing. She is not toxic-appearing or diaphoretic.      Comments: Cachetic, temporal wasting, jaundiced, weak, but in no apparent distress   HENT:      Head: Normocephalic.      Nose: Nose normal.   Eyes:      General: Scleral icterus present.      Extraocular Movements: Extraocular movements intact.      Pupils: Pupils are equal, round, and reactive to light.   Cardiovascular:      Rate and Rhythm: Regular rhythm. Tachycardia present.      Pulses: Normal pulses.      Heart sounds: Normal heart sounds. No murmur heard.  Pulmonary:      Effort: Pulmonary effort is normal. No respiratory distress.      Breath sounds: Normal breath sounds. No stridor. No wheezing or rales.   Abdominal:      General: Abdomen is flat. There is distension.      Palpations: Abdomen is soft.      Tenderness: There is no abdominal tenderness. There is no guarding or rebound.   Musculoskeletal:         General: No swelling or tenderness. Normal range of motion.      Cervical back: Normal range of motion. No rigidity or tenderness.      Right lower leg: No edema.      Left lower leg: No edema.   Skin:     General: Skin is warm and dry.       Coloration: Skin is jaundiced.      Findings: Bruising and rash (petecchial rash on back) present.   Neurological:      General: No focal deficit present.      Mental Status: She is alert and oriented to person, place, and time. Mental status is at baseline.      Cranial Nerves: No cranial nerve deficit.      Sensory: No sensory deficit.      Motor: No weakness.      Comments: Slightly sleepy, very tired, though she attributes this to long ride in uncomfortable transport   Psychiatric:         Mood and Affect: Mood normal.         Behavior: Behavior normal.         Thought Content: Thought content normal.       Significant Labs:  I have reviewed all pertinent lab results within the past 24 hours.  CBC:   Recent Labs   Lab 03/28/22 2127   WBC 9.25   RBC 2.40*   HGB 8.2*   HCT 25.3*   *   *   MCH 34.2*   MCHC 32.4     CMP:   Recent Labs   Lab 03/29/22  0310   *   CALCIUM 9.4   ALBUMIN 3.2*   PROT 5.7*      K 3.6   CO2 29      BUN 11   CREATININE 0.4*   ALKPHOS 949*   ALT 79*   *   BILITOT 9.3*     Coagulation:   Recent Labs   Lab 03/28/22 2127   LABPROT 13.9*   INR 1.4*     Cardiac markers: No results for input(s): CKMB, CPKMB, TROPONINT, TROPONINI, MYOGLOBIN in the last 168 hours.    Significant Diagnostics:  I have reviewed all pertinent imaging results/findings within the past 24 hours.      Assessment/Plan:     * Liver injury  37yo female with a medical history significant for IVDU on suboxone, polysubstance use (meth, opioids, tobacco) and gastric ulcers s/p recent cholecystectomy at OSH on 3/8 who presented to [facility] as transfer to MICU for hepatology evaluation given increasing jaundice, lethargy, AM and increased oxygen requirement. General surgery consulted given concern for possible small bowel obstruction.     - Patient seen and examined, labs and imaging reviewed  - Given she is having bowel movements and passing gas, do not feel like the patient is clinically  obstructed despite CT findings; however, given her current status and the fact she is likely quite fragile would recommend treating conservatively with NGT  - Recommend performing gastrograffin challenge today via NGT  - NPO for now  - Remainder of care per primary team  - Please call with questions or concerns      VTE Risk Mitigation (From admission, onward)         Ordered     Place sequential compression device  Until discontinued         03/28/22 1856     IP VTE LOW RISK PATIENT  Once         03/28/22 1856                Thank you for your consult. I will follow-up with patient. Please contact us if you have any additional questions.    Sandy Gregory MD  General Surgery  [facility] - Cardiac Medical ICU

## 2022-03-29 NOTE — HPI
Patient is a 35yo female with a medical history significant for IVDU on suboxone, polysubstance use (meth, opioids, tobacco) and gastric ulcers s/p recent cholecystectomy at OSH on 3/8 who presented to [facility] [place] to MICU for hepatology evaluation given increasing jaundice, lethargy, AM and increased oxygen requirement. She was initially admitted to an OSH given these complaints and was found to have an elevated t bili and ammonia but reportedly normal LFTs. She was stared on lactulose and rifaximin. Additionally, there was a concern for partial SBO so an NGT was placed. Per notes, she progressed well from an obstructive standpoint and was passing flatus and having bowel movements per the patient. Notes also support that she was tolerating a clear diet. She is hungry and denies any nausea. No fevers. Tachycardic in the 110s, normal WBC, t bili 9.3. LFTs mildly elevated. CT scan demonstrated dilated loops of bowel and intra-abdomina fluid. General surgery consulted for evaluation.    Specifically denies chest pain, increased work of breathing, increasing/worsening abdominal pain, fever/chills, nausea, vomiting, constipation, diarrhea, dysuria, suprapubic pain, recent IVDU (currently on suboxone), hallucinations (auditory and visual), tremors, seizures, loss of consciousness, headache, sensory/strength changes, abnormal bleeding.  Substantial IVDU history (meth), non-injectable opioid abuse. Recently stopped smoking history (smoked since 12 y/o). On suboxone per OSH report and per patient.   Surgical history notable for recent cholecystectomy and C-sections in the past (last was September per her report).

## 2022-03-29 NOTE — ASSESSMENT & PLAN NOTE
H/o pSBO  H/o laparoscopic cholecystectomy    Patient with recent h/o pSBO at OSH,with good response to NGT decompression. Advanced to [facility]. Initially complaining of RUQ abdominal pain at OSH on initial presentation. Now with mild, diffuse abdominal pain on palpation, but none at rest. Abdomen remains distended. Hypoactive bowel sounds.    - Repeat KUB / CTAP demonstrating significant bowel dilatation aw/ air-fluid levels c/w SBO  - Monitor BM, diet tolerance (currently with both BM and diet tolerance)  - FLD, ADAT  - General surgery evaluated, no role for surgical intervention  - Bedside US abdomen without note for ascites or other process  - Serial abdominal exams

## 2022-03-29 NOTE — PT/OT/SLP EVAL
Physical Therapy  Co-Evaluation and co-treatment with OT    Patient Name:  Carolina Baldwin   MRN:  [id number]    Recommendations:     Discharge Recommendations:  home health PT   Discharge Equipment Recommendations:  (will determine DME needs closer to discharge)   Barriers to discharge: None    Assessment:     Carolina Baldwin is a 36 y.o. female admitted with a medical diagnosis of Liver injury.  She presents with the following impairments/functional limitations:  weakness, impaired endurance, impaired functional mobilty, gait instability, impaired balance pt tolerated treatment well but diarrhea decreased functional mobility. Pt will benefit from skilled PT 3x/wk to progress physically. Pt should be able to discharge home with no needs when medically stable. Pt was transferred from [facility].    Rehab Prognosis: Good; patient would benefit from acute skilled PT services to address these deficits and reach maximum level of function.    Recent Surgery: * No surgery found *      Plan:     During this hospitalization, patient to be seen 3 x/week to address the identified rehab impairments via gait training, therapeutic activities, therapeutic exercises and progress toward the following goals:    · Plan of Care Expires:  04/27/22    Subjective     Chief Complaint: pt c/o slight dizziness with sitting on EOB.   Patient/Family Comments/goals:  To get better and go home.   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 They live in 1 story with 3 steps and B handrails.   Prior to admission, patients level of function was modified Independent using rollator. .  Equipment used at home: rollator.  DME owned (not currently used): none.  Upon discharge,  patient will have assistance from boyfriend.    Objective:     Communicated with nurse prior to session.  Patient found supine with telemetry, pulse ox (continuous), blood pressure cuff, oxygen, brown catheter, NG tube  upon PT entry to room.    General Precautions: Standard, fall   Orthopedic Precautions:    Braces:    Respiratory Status: Nasal cannula, flow 4 L/min    Exams:  · Cognitive Exam:  Patient is oriented to Person, Place, Time and Situation  · RLE ROM: WFL  · RLE Strength: WFL  · LLE ROM: WFL  · LLE Strength: WFL    Functional Mobility:  · Bed Mobility:   Pt needed verbal cues for hand placement and sequencing for functional mobility.   · Rolling Left:  stand by assistance x 2 reps for cleaning  · Rolling Right: stand by assistance x 2 reps for cleaning  · Supine to Sit: minimum assistance  · Sit to Supine: minimum assistance  ·   · Transfers:     · Sit to Stand:  not tested due to pt diarrhea with no AD  ·   · Gait: not tested due to pt diarrhea  ·   · Balance: pt sat on EOB x 10 min with SBA    Due to pt complex medical condition, the skill of 2 licensed therapists is needed to maximize treatment session and progression towards goals.     Therapeutic Activities and Exercises:   pt received verbal instructions in role of PT and POC. Pt verbally expressed understanding of such.     AM-PAC 6 CLICK MOBILITY  Total Score:15     Patient left supine with all lines intact, call button in reach and RN notified.    GOALS:   Multidisciplinary Problems     Physical Therapy Goals        Problem: Physical Therapy    Goal Priority Disciplines Outcome Goal Variances Interventions   Physical Therapy Goal     PT, PT/OT Ongoing, Progressing     Description: Goals to be met by: 22    Patient will increase functional independence with mobility by performin. Supine to sit with Stand-by Assistance  2. Sit to stand transfer with Contact Guard Assistance with RW  3. Gait  x 100 feet with Contact Guard Assistance  using RW.   4. Ascend/descend 3 stair with bilateral Handrails Contact Guard Assistance   5. Lower extremity exercise program x15 reps with assistance as needed to increase tolerance to activities.                      History:     Past Medical History:   Diagnosis Date    IVDU (intravenous drug user)     Opioid use     Polysubstance abuse     Tobacco abuse disorder        Past Surgical History:   Procedure Laterality Date     SECTION      LAPAROSCOPIC CHOLECYSTECTOMY         Time Tracking:     PT Received On: 22  PT Start Time: 1030     PT Stop Time: 1055  PT Total Time (min): 25 min     Billable Minutes: Evaluation 8 min and Therapeutic Activity 17 min      2022

## 2022-03-29 NOTE — ASSESSMENT & PLAN NOTE
No baseline home O2 per patient. Presenting with 5 L NC O2. Unclear etiology, though previous documentation suggestive of pleural effusion vs component of PNA. Unclear etiology as patient seems to be volume down on exam. No reported history of heart failure or COPD (though extensive tobacco use history). Etiology may be 2/2 to abdominal distention causing diaphragmatic pressure vs infectious etiology vs pleural effusion d/t third spacing?    - Repeat CXR  - Continuous pulse ox  - Monitor fever curve; given lack of infectious presentation, will defer abx course now  - Lactate  - Aspiration precaution

## 2022-03-29 NOTE — PLAN OF CARE
Patient is AAO x 4 on 1L NC. Patient c/o of anxiousness and inability to sleep, PO melatonin and ativan administered prn per provider orders. VS and assessment per flow sheet, patient progressing towards goals as tolerated, plan of care reviewed with patient, bed low, call bell within reach, no sxs of distress, all concerns addressed, will continue plan of care per provider orders.    Problem: Adult Inpatient Plan of Care  Goal: Plan of Care Review  Outcome: Ongoing, Progressing  Goal: Patient-Specific Goal (Individualized)  Outcome: Ongoing, Progressing  Flowsheets (Taken 3/29/2022 1306)  Anxieties, Fears or Concerns: cocnern about feeling anxious  Individualized Care Needs: mobile device and  removed from bed per pt request  Patient-Specific Goals (Include Timeframe): PO ativan administered to reduce anxiety per provider orders, PO melatonin administered prn for insomnia per provider orders  Goal: Optimal Comfort and Wellbeing  Outcome: Ongoing, Progressing     Problem: Skin Injury Risk Increased  Goal: Skin Health and Integrity  Outcome: Ongoing, Progressing    CMICU DAILY GOALS       A: Awake    RASS: Goal -    Actual -     Restraint necessity:    B: Breathe   SBT: Not intubated   C: Coordinate A & B, analgesics/sedatives   Pain:  No     SAT: Not intubated  D: Delirium   CAM-ICU: Overall CAM-ICU: Negative  E: Early(intubated/ Progressive (non-intubated) Mobility   MOVE Screen: Pass   Activity: Activity Management: Arm raise - L1, Rolling - L1  FAS: Feeding/Nutrition   Diet order: Diet/Nutrition Received: clear liquid,    T: Thrombus   DVT prophylaxis: VTE Required Core Measure: Provider determined low risk VTE  H: HOB Elevation   Head of Bed (HOB) Positioning: HOB elevated  U: Ulcer Prophylaxis   GI: yes  G: Glucose control   managed    S: Skin   Bathing/Skin Care: dressed/undressed, incontinence care  Device Skin Pressure Protection: absorbent pad utilized/changed, adhesive use limited, skin-to-skin  areas padded, pressure points protected  Pressure Reduction Devices: positioning supports utilized, pressure-redistributing mattress utilized, foam padding utilized, specialty bed utilized  Pressure Reduction Techniques: weight shift assistance provided  Skin Protection: adhesive use limited, incontinence pads utilized, transparent dressing maintained, skin-to-skin areas padded, tubing/devices free from skin contact  B: Bowel Function   diarrhea   I: Indwelling Catheters   Perez necessity:      Urethral Catheter 03/28/22 6646 Latex-Reason for Continuing Urinary Catheterization: Critically ill in ICU and requiring hourly monitoring of intake/output   CVC necessity: Yes  D: De-escalation Antibiotics   No    Family/Goals of care/Code Status   Code Status: Full Code    24H Vital Sign Range  Temp:  [97.9 °F (36.6 °C)-99.9 °F (37.7 °C)]   Pulse:  [110-125]   Resp:  [25-37]   BP: (110-132)/(77-96)   SpO2:  [95 %-99 %]

## 2022-03-29 NOTE — MEDICAL/APP STUDENT
3/29/2022 9:31 AM  Carolina Baldwin  1985  [id number]      ADDICTION CONSULT INITIAL EVALUATION     DEPARTMENT:  Psychiatry  SITE: [facility], [address]    DATE OF ADMISSION: 3/28/2022  6:40 PM  LENGTH OF STAY: 1 days    EXAMINING PRACTITIONER: Elizabeth Ferrer    CONSULT REQUESTED BY: Pawel Velez MD      SUBJECTIVE     CHIEF COMPLAINT  Carolina Baldwin is a 36 y.o. female who is seen today for an initial psychiatric evaluation by the addiction psychiatry consult service.  Carolina Baldwin presents with the chief complaint of: abdominal pain, increasing jaundice, failure to thrive (weight 35.5 kg), suspected liver failure and a hx of IVDU (currently on Suboxone).       HISTORY OF PRESENT ILLNESS    Per Primary MD:  35 y/o F with PMHx IVDU on suboxone, polysubstance use (meth, opioids, tobacco), gastric ulcers initially presented to OSH for increasing jaundice, lethargy, AMS, evaluation for pSBO, increased O2 requirement (3-4 L, no baseline home O2). Was started on broad spectrum Abx at OSH and eventually discontinued. Initially presented oriented x 2 and severe jaundice, elevated ammonia, but normal LFTs? Started on lactulose and rifaximin. NGT decompression at OSH for SBO with good response, advanced to [facility]. [place] [facility] [place].      On presentation [place] [facility], patient is overtly jaundiced but with intact mentation (a&o x4). Complains of lower back pain that she experienced from the long transport from OSH to [facility]. Also complains of increasing hunger and thirst (good appetite). Questionable historian, but understands the circumstance, stating she knows she is having new liver problems and is here for the hepatology team to evaluate her. Patient reports having good bowel movements and appropriate urinary voids. Specifically denies chest pain, increased work of breathing, increasing/worsening abdominal pain, fever/chills, nausea, vomiting, constipation, diarrhea,  dysuria, suprapubic pain, recent IVDU (currently on suboxone), hallucinations (auditory and visual), tremors, seizures, loss of consciousness, headache, sensory/strength changes, abnormal bleeding.     Patient is from [place], [place]. Lives with boyfriend and 5 children. Of note, she calls her boyfriend her  but is not legally ; her closest relative is her sister. Substantial IVDU history (meth), non-injectable opioid abuse. Recently stopped smoking history (smoked since 10 y/o). On suboxone per OSH report and per patient. Does not know if any active HIV, hepatitis infection. Family history of CAD, ACS, and cancer (unspecified). 37 y/o F with PMHx IVDU on suboxone, polysubstance use (meth, opioids, tobacco), gastric ulcers initially presented to OSH for increasing jaundice, lethargy, AMS, evaluation for pSBO, increased O2 requirement (3-4 L, no baseline home O2). Was started on broad spectrum Abx at OSH and eventually discontinued. Initially presented oriented x 2 and severe jaundice, elevated ammonia, but normal LFTs? Started on lactulose and rifaximin. NGT decompression at OSH for SBO with good response, advanced to CLD. Transferred to [facility] for hepatology evaluation.      On presentation to [facility] MICU, patient is overtly jaundiced but with intact mentation (a&o x4). Complains of lower back pain that she experienced from the long transport from OSH to [facility]. Also complains of increasing hunger and thirst (good appetite). Questionable historian, but understands the circumstance, stating she knows she is having new liver problems and is here for the hepatology team to evaluate her. Patient reports having good bowel movements and appropriate urinary voids. Specifically denies chest pain, increased work of breathing, increasing/worsening abdominal pain, fever/chills, nausea, vomiting, constipation, diarrhea, dysuria, suprapubic pain, recent IVDU (currently on suboxone), hallucinations (auditory and visual),  tremors, seizures, loss of consciousness, headache, sensory/strength changes, abnormal bleeding.     Patient is from [place], [place]. Lives with boyfriend and 5 children. Of note, she calls her boyfriend her  but is not legally ; her closest relative is her sister. Substantial IVDU history (meth), non-injectable opioid abuse. Recently stopped smoking history (smoked since 10 y/o). On suboxone per OSH report and per patient. Does not know if any active HIV, hepatitis infection. Family history of CAD, ACS, and cancer (unspecified).     Per Addiction Psych MD:  On initial observation, Miss Baldwin appears cachetic and severely jaundiced with increased work of breathing. She does not show signs of acute withdrawal including tremulousness or diaphoresis. Pt endorses a history of IVDU and non-injectable opioid use but confirms that she had been taking Suboxone for the past 7 years up until her most recent hospitalization. Pt endorses experiencing nausea, headaches and diarrhea when coming off the Suboxone approximately 1 month ago. Pt is not interested in re-starting the Suboxone at this time.     Pt denies any recent drug use, SI/HI, AVH, or symptoms of depression. She endorses feeling anxious since being in the hospital. Pt has no other psychiatric hx of note. She endorses using tobacco but has not smoked since being hospitalized. She is currently unemployed and lives with her partner in [place], [place]. Pt has 5 children, the youngest of which is 5 months old. Pt endorses that her sister is currently caring for the children in [place].     COLLATERAL  ***    SUBSTANCE ABUSE HISTORY  Substance(s) of Choice: IVD (meth), non-injectable opioids  Substances Used: meth, non-injectable opioids   History of IVDU?: yes  Use of Alcohol: no  Average Consumption: n/a  Last Drink: n/a  Use of Medications for Alcohol/Opioid Use Disorder: Suboxone (past 7 years)  History of Complicated Withdrawal: no  History of Detox: unable to  assess  Rehab History: unable to assess   AA/NA involvement: unable to assess  Tobacco: yes (smoking since 10 yo)  Spouse/Partner Consumption: unable to assess  Patient Aware of Biomedical Complications: yes    DSM-5 SUBSTANCE USE DISORDER CRITERIA   Mild (1-3), Moderate (4-5), Severe (?6)  1. Often take in larger amounts or over a longer period of time than was intended.  2. Persistent desire or unsuccessful efforts to cut down or control use.  3. Great deal of time spent in activities necessary to obtain substance, use, or recover from effects.  4. Craving/strong desire for substance or urge to use.  5. Use resulting in failure to fulfill major role obligations at home, work or school.  6. Social, occupational, recreational activities decreased because of use.  7. Continued use despite having persistent or recurrent social or interpersonal problems cause or exaserbated by the substance.  8. Recurrent use in situations in which it is physically hazardous.  9. Use despite physical or psychological problems that are likely to have been caused or exacerbated by the substance.  10. Tolerance, as defined by either of the following.   A. A need for markedly increased amounts of substance to achieve intoxication or desired effect. -OR-    B. A markedly diminished effect with continued use of the same amount of substance.  11. Withdrawal, as manifested by the following.   A. The characteristic withdrawal syndrome for substance. -AND-   B. Substance is taken to relieve or avoid withdrawal symptoms.    ARE THE CRITERIA MET FOR DSM-5 SUBSTANCE USE DISORDER: no, pt has a PmHx of substance use disorder but has been on Suboxone for 7 years is not using at this time       PSYCHIATRIC HISTORY  Reported Diagnose(s): none  Previous Medication Trials: none  Previous Psychiatric Hospitalizations: none  Outpatient Psychiatrist?: no      SUICIDE/HOMICIDE RISK ASSESSMENT  Current/active suicidal ideation/plan/intent: no  Previous suicide  "attempts: no  Current/active homicidal ideation/plan/intent: no      HISTORY OF TRAUMA, ABUSE & VIOLENCE  Trauma: unable to assess  Physical Abuse: unable to assess  Sexual Abuse: unable to assess  Violent Conduct: unable to assess    Access to Gun: unable to assess      FAMILY PSYCHIATRIC HISTORY   none      SOCIAL HISTORY  Pt is currently unemployed  Lives with partner in [place], [place]  Says she is not  but "close enough"  Pt has 5 children - oldest currently lives in [place], youngest in 5 months old; her younger children currently live with her sister in [place] since she has been hospitalized      PAST MEDICAL HISTORY   - IVDU (meth)  - Opioid use  - Polysubstance abuse  - Tobacco abuse disorder   - Acute hypoxemic respiratory failure  - Paroxysmal tachycardia  - SBO  - Cholecystectomy  - Liver failure, acute hepatic metabolic encephalopathy   - Failure to thrive (temporal wasting, malnutrition)      PSYCHOSOCIAL FACTORS  Stressors (Psychosocial and Environmental): { :92763}. Current issues with health, family      PSYCHIATRIC ROS  - Denies symptoms of depression, ilan, delusions, paranoia.   - Denies SI/HI, AVH  - Pt endorses feeling anxious since being in the hospital    MEDICAL ROS    Complete review of systems performed covering Constitutional, Eyes, ENT/Mouth, Cardiovascular, Respiratory, Gastrointestinal, Genitourinary, Musculoskeletal, Skin, Neurologic, Endocrine, Heme/Lymph, and Allergy/Immune.     Complete review of systems was negative with the exception of the following positive symptoms: activity change, appetite, fatigue, dental problems, shortness of breath, abdominal distension, jaundice, weakness      ALLERGIES  Penicillins      MEDICATIONS    Psychotropics:  none    Infusions:   sodium chloride 0.9% 5 mL/hr at 03/29/22 0636       Scheduled:   aluminum-magnesium hydroxide-simethicone  30 mL Oral QID (AC & HS)    diatrizoate meglumineand-diatrizoate sodium  100 mL Per NG tube Once    " famotidine  20 mg Oral BID    furosemide (LASIX) injection  40 mg Intravenous Daily    lactulose  20 g Oral BID    mupirocin   Nasal BID    nicotine  1 patch Transdermal Daily    rifAXImin  550 mg Oral BID       PRN:  melatonin, ondansetron, sodium chloride 0.9%    Home Medications:  Prior to Admission medications    Not on File         OBJECTIVE:     EXAMINATION    Vitals:    03/29/22 0705 03/29/22 0715 03/29/22 0730 03/29/22 0812   BP: (!) 131/97      BP Location: Right arm      Patient Position: Lying      Pulse: (!) 116 (!) 119 (!) 118 (!) 116   Resp: (!) 30 (!) 32 (!) 27 (!) 29   Temp: 97.9 °F (36.6 °C)      TempSrc: Oral      SpO2: 95% 95% (!) 93% 96%   Weight:           PAIN   Pt appears uncomfortable on observation    CONSTITUTIONAL  General Appearance and Manner: ill-appearing, cachetic, temporal wasting, jaundiced, weak, increased work of breathing on observation but otherwise cooperative     MUSCULOSKELETAL  Abnormal Involuntary Movements: none  Muscle Strength and Tone: weak  Gait and Station: n/a    PSYCHIATRIC   Orientation: ***  Speech: ***  Language: ***  Mood: ***  Affect: ***  Thought Process: ***  Associations: ***  Thought Content: ***  Memory: ***  Attention and Concentration: ***  Fund of Knowledge: ***  Insight: ***  Judgment: ***      ASSESSMENT:     DIAGNOSES & PROBLEMS    ***      STRENGTHS AND LIABILITIES   {PSY STRENGTHS/LIABILITIES:20514}      MOTIVATION TO PURSUE RECOVERY: {desc; high/low:77194}    ACCEPTANCE OF ADDICTION: {Progress:20262}    ABILITY TO ADHERE TO TREATMENT PLAN: {desc; high/low:97054}      PLAN:       MANAGEMENT PLAN, TREATMENT GOALS, THERAPEUTIC TECHNIQUES/APPROACHES & CLINICAL REASONING    ***      · Patient counseled on abstinence from alcohol and substances of abuse (illicit and prescription).  · Additional workup planned to address substance use disorder, in order to guide and refine ongoing management options, includes serial alcohol and drug laboratory  testing (e.g. PETH, urine toxicology).  · Relapse prevention and motivational interviewing provided.  · Education provided on 12 step recovery programs.      PRESCRIPTION DRUG MANAGEMENT  - The risks and benefits of medication were discussed with this patient.  - Possible expectable adverse effects of any current or proposed individual psychotropic agents were discussed with this patient.  - Counseling was provided on the importance of full compliance with medication regimens.      In cases of emergency, daily coverage provided by Acute/ER Psych MD, NP, or SW, with contact numbers located in [facility] On Call Schedule    Case discussed with staff addiction psychiatrist: JEFFREY DHILLON***, MD      LABS/IMAGING/STUDIES   Recent Results (from the past 24 hour(s))   Pregnancy, urine rapid    Collection Time: 03/28/22  7:57 PM   Result Value Ref Range    Preg Test, Ur Negative    Toxicology screen, urine    Collection Time: 03/28/22  7:57 PM   Result Value Ref Range    Alcohol, Urine <10 <10 mg/dL    Benzodiazepines Negative Negative    Methadone metabolites Negative Negative    Cocaine (Metab.) Negative Negative    Opiate Scrn, Ur Negative Negative    Barbiturate Screen, Ur Negative Negative    Amphetamine Screen, Ur Negative Negative    THC Negative Negative    Phencyclidine Negative Negative    Creatinine, Urine 25.0 15.0 - 325.0 mg/dL    Toxicology Information SEE COMMENT    POCT glucose    Collection Time: 03/28/22  9:25 PM   Result Value Ref Range    POCT Glucose 139 (H) 70 - 110 mg/dL   Protime-INR    Collection Time: 03/28/22  9:27 PM   Result Value Ref Range    Prothrombin Time 13.9 (H) 9.0 - 12.5 sec    INR 1.4 (H) 0.8 - 1.2   Acetaminophen Level    Collection Time: 03/28/22  9:27 PM   Result Value Ref Range    Acetaminophen (Tylenol), Serum <3.0 (L) 10.0 - 20.0 ug/mL   Ceruloplasmin    Collection Time: 03/28/22  9:27 PM   Result Value Ref Range    Ceruloplasmin 19.0 15.0 - 45.0 mg/dL   Phosphorus     Collection Time: 03/28/22  9:27 PM   Result Value Ref Range    Phosphorus 3.7 2.7 - 4.5 mg/dL   Magnesium    Collection Time: 03/28/22  9:27 PM   Result Value Ref Range    Magnesium 1.7 1.6 - 2.6 mg/dL   Comprehensive metabolic panel    Collection Time: 03/28/22  9:27 PM   Result Value Ref Range    Sodium 138 136 - 145 mmol/L    Potassium 3.7 3.5 - 5.1 mmol/L    Chloride 102 95 - 110 mmol/L    CO2 27 23 - 29 mmol/L    Glucose 108 70 - 110 mg/dL    BUN 11 6 - 20 mg/dL    Creatinine 0.4 (L) 0.5 - 1.4 mg/dL    Calcium 8.8 8.7 - 10.5 mg/dL    Total Protein 5.3 (L) 6.0 - 8.4 g/dL    Albumin 3.1 (L) 3.5 - 5.2 g/dL    Total Bilirubin 9.3 (H) 0.1 - 1.0 mg/dL    Alkaline Phosphatase 882 (H) 55 - 135 U/L     (H) 10 - 40 U/L    ALT 71 (H) 10 - 44 U/L    Anion Gap 9 8 - 16 mmol/L    eGFR if African American >60.0 >60 mL/min/1.73 m^2    eGFR if non African American >60.0 >60 mL/min/1.73 m^2   Ammonia    Collection Time: 03/28/22  9:27 PM   Result Value Ref Range    Ammonia 51 (H) 10 - 50 umol/L   CBC auto differential    Collection Time: 03/28/22  9:27 PM   Result Value Ref Range    WBC 9.25 3.90 - 12.70 K/uL    RBC 2.40 (L) 4.00 - 5.40 M/uL    Hemoglobin 8.2 (L) 12.0 - 16.0 g/dL    Hematocrit 25.3 (L) 37.0 - 48.5 %     (H) 82 - 98 fL    MCH 34.2 (H) 27.0 - 31.0 pg    MCHC 32.4 32.0 - 36.0 g/dL    RDW 27.6 (H) 11.5 - 14.5 %    Platelets 135 (L) 150 - 450 K/uL    MPV 12.3 9.2 - 12.9 fL    Immature Granulocytes 3.6 (H) 0.0 - 0.5 %    Gran # (ANC) 7.1 1.8 - 7.7 K/uL    Immature Grans (Abs) 0.33 (H) 0.00 - 0.04 K/uL    Lymph # 1.3 1.0 - 4.8 K/uL    Mono # 0.5 0.3 - 1.0 K/uL    Eos # 0.0 0.0 - 0.5 K/uL    Baso # 0.02 0.00 - 0.20 K/uL    nRBC 0 0 /100 WBC    Gran % 76.3 (H) 38.0 - 73.0 %    Lymph % 14.3 (L) 18.0 - 48.0 %    Mono % 5.6 4.0 - 15.0 %    Eosinophil % 0.0 0.0 - 8.0 %    Basophil % 0.2 0.0 - 1.9 %    Differential Method Automated    Lactic acid, plasma    Collection Time: 03/28/22  9:39 PM   Result Value  Ref Range    Lactate (Lactic Acid) 1.0 0.5 - 2.2 mmol/L   Brain natriuretic peptide    Collection Time: 03/28/22  9:39 PM   Result Value Ref Range    BNP 1,251 (H) 0 - 99 pg/mL   Phosphorus    Collection Time: 03/29/22  3:10 AM   Result Value Ref Range    Phosphorus 4.0 2.7 - 4.5 mg/dL   Magnesium    Collection Time: 03/29/22  3:10 AM   Result Value Ref Range    Magnesium 1.7 1.6 - 2.6 mg/dL   Comprehensive metabolic panel    Collection Time: 03/29/22  3:10 AM   Result Value Ref Range    Sodium 139 136 - 145 mmol/L    Potassium 3.6 3.5 - 5.1 mmol/L    Chloride 100 95 - 110 mmol/L    CO2 29 23 - 29 mmol/L    Glucose 113 (H) 70 - 110 mg/dL    BUN 11 6 - 20 mg/dL    Creatinine 0.4 (L) 0.5 - 1.4 mg/dL    Calcium 9.4 8.7 - 10.5 mg/dL    Total Protein 5.7 (L) 6.0 - 8.4 g/dL    Albumin 3.2 (L) 3.5 - 5.2 g/dL    Total Bilirubin 9.3 (H) 0.1 - 1.0 mg/dL    Alkaline Phosphatase 949 (H) 55 - 135 U/L     (H) 10 - 40 U/L    ALT 79 (H) 10 - 44 U/L    Anion Gap 10 8 - 16 mmol/L    eGFR if African American >60.0 >60 mL/min/1.73 m^2    eGFR if non African American >60.0 >60 mL/min/1.73 m^2   CBC auto differential    Collection Time: 03/29/22  8:20 AM   Result Value Ref Range    WBC 10.08 3.90 - 12.70 K/uL    RBC 2.54 (L) 4.00 - 5.40 M/uL    Hemoglobin 8.7 (L) 12.0 - 16.0 g/dL    Hematocrit 27.9 (L) 37.0 - 48.5 %     (H) 82 - 98 fL    MCH 34.3 (H) 27.0 - 31.0 pg    MCHC 31.2 (L) 32.0 - 36.0 g/dL    RDW 27.7 (H) 11.5 - 14.5 %    Platelets 158 150 - 450 K/uL    MPV 11.8 9.2 - 12.9 fL    Immature Granulocytes 2.6 (H) 0.0 - 0.5 %    Gran # (ANC) 7.9 (H) 1.8 - 7.7 K/uL    Immature Grans (Abs) 0.26 (H) 0.00 - 0.04 K/uL    Lymph # 1.3 1.0 - 4.8 K/uL    Mono # 0.6 0.3 - 1.0 K/uL    Eos # 0.0 0.0 - 0.5 K/uL    Baso # 0.02 0.00 - 0.20 K/uL    nRBC 0 0 /100 WBC    Gran % 78.4 (H) 38.0 - 73.0 %    Lymph % 12.8 (L) 18.0 - 48.0 %    Mono % 5.9 4.0 - 15.0 %    Eosinophil % 0.1 0.0 - 8.0 %    Basophil % 0.2 0.0 - 1.9 %    Differential  Method Automated    Protime-INR    Collection Time: 03/29/22  8:27 AM   Result Value Ref Range    Prothrombin Time 12.6 (H) 9.0 - 12.5 sec    INR 1.2 0.8 - 1.2

## 2022-03-29 NOTE — SUBJECTIVE & OBJECTIVE
Past Medical History:   Diagnosis Date    IVDU (intravenous drug user)     Opioid use     Polysubstance abuse     Tobacco abuse disorder        Past Surgical History:   Procedure Laterality Date     SECTION      LAPAROSCOPIC CHOLECYSTECTOMY         Review of patient's allergies indicates:   Allergen Reactions    Penicillins Shortness Of Breath and Swelling       Family History       Family history is unknown by patient.          Tobacco Use    Smoking status: Former Smoker     Types: Cigarettes    Smokeless tobacco: Never Used    Tobacco comment: Smoking since 12 y/o, recently quit   Substance and Sexual Activity    Alcohol use: Not Currently    Drug use: Not Currently     Types: Methamphetamines    Sexual activity: Not on file      Review of Systems   Constitutional:  Positive for activity change, appetite change and fatigue. Negative for chills, diaphoresis and fever.   HENT:  Positive for dental problem (all teeth removed previously d/t poor dentition). Negative for sneezing and sore throat.    Eyes:  Negative for pain and visual disturbance.   Respiratory:  Positive for shortness of breath. Negative for cough, chest tightness and wheezing.    Cardiovascular:  Negative for chest pain, palpitations and leg swelling.   Gastrointestinal:  Positive for abdominal distention. Negative for abdominal pain, blood in stool, constipation, diarrhea, nausea and vomiting.   Genitourinary:  Negative for dysuria, hematuria and urgency.   Musculoskeletal:  Negative for arthralgias and myalgias.   Skin:  Positive for color change (jaundice). Negative for rash.   Neurological:  Positive for weakness. Negative for tremors, syncope, light-headedness and numbness.   Psychiatric/Behavioral:  Negative for agitation and confusion.    Objective:     Vital Signs (Most Recent):  Temp: 98 °F (36.7 °C) (22)  Pulse: (!) 117 (22)  Resp: (!) 0 (22)  BP: (!) 129/90 (22)  SpO2: 98 % (22  1900)   Vital Signs (24h Range):  Temp:  [98 °F (36.7 °C)-99.9 °F (37.7 °C)] 98 °F (36.7 °C)  Pulse:  [117-125] 117  Resp:  [0-37] 0  SpO2:  [96 %-98 %] 98 %  BP: (116-132)/(90-96) 129/90   Weight: (P) 35.5 kg (78 lb 4.2 oz)  There is no height or weight on file to calculate BMI.    No intake or output data in the 24 hours ending 03/28/22 2003    Physical Exam  Constitutional:       General: She is not in acute distress.     Appearance: She is ill-appearing. She is not toxic-appearing or diaphoretic.      Comments: Cachetic, temporal wasting, jaundiced, weak, but in no apparent distress   HENT:      Head: Normocephalic.      Nose: Nose normal.   Eyes:      General: Scleral icterus present.      Extraocular Movements: Extraocular movements intact.      Pupils: Pupils are equal, round, and reactive to light.   Cardiovascular:      Rate and Rhythm: Regular rhythm. Tachycardia present.      Pulses: Normal pulses.      Heart sounds: Normal heart sounds. No murmur heard.  Pulmonary:      Effort: Pulmonary effort is normal. No respiratory distress.      Breath sounds: Normal breath sounds. No stridor. No wheezing or rales.   Abdominal:      General: Abdomen is flat. There is distension.      Palpations: Abdomen is soft.      Tenderness: abdominal tenderness (diffuse tenderness, intermittently) There is no guarding or rebound.   Musculoskeletal:         General: No swelling or tenderness. Normal range of motion.      Cervical back: Normal range of motion. No rigidity or tenderness.      Right lower leg: No edema.      Left lower leg: No edema.   Skin:     General: Skin is warm and dry.      Coloration: Skin is jaundiced.      Findings: Bruising and rash (petecchial rash on back) present.   Neurological:      General: No focal deficit present.      Mental Status: She is alert and oriented to person, place, and time. Mental status is at baseline.      Cranial Nerves: No cranial nerve deficit.      Sensory: No sensory deficit.       Motor: No weakness.      Comments: Slightly sleepy, very tired, though she attributes this to long ride in uncomfortable transport   Psychiatric:         Mood and Affect: Mood normal.         Behavior: Behavior normal.         Thought Content: Thought content normal.       Vents:     Lines/Drains/Airways       Drain  Duration                  Urethral Catheter 03/28/22 1848 Latex <1 day                  Significant Labs:    CBC/Anemia Profile:  No results for input(s): WBC, HGB, HCT, PLT, MCV, RDW, IRON, FERRITIN, RETIC, FOLATE, [id number], OCCULTBLOOD in the last 48 hours.     Chemistries:  No results for input(s): NA, K, CL, CO2, BUN, CREATININE, CALCIUM, ALBUMIN, PROT, BILITOT, ALKPHOS, ALT, AST, GLUCOSE, MG, PHOS in the last 48 hours.    All pertinent labs within the past 24 hours have been reviewed.    Significant Imaging: I have reviewed all pertinent imaging results/findings within the past 24 hours.

## 2022-03-29 NOTE — ASSESSMENT & PLAN NOTE
Presenting with tachycardia to 120s. Noted in OSH previous progress note and was tempered with BB. Unclear etiology, potentially volume down driving sinus tachycardia as patient has been on NGT suction d/t pSBO with limited PO intake. Also appears to be extremely malnourished. In setting of potential acute pulmonary process, may be related to acute infection.    - Repeat EKG demonstrating sinus tachycardia and abnormal R wave progression; no previous EKG for comparison  - Cardiac telemetry  - Electrolyte trend, K > 4, Mg > 2  - Will hold trial of IVF bolus until further evaluation regarding volume status (BNP elevated 1000+ on admission)  - TTE pending

## 2022-03-29 NOTE — CONSULTS
[facility]  Gastroenterology  Consult Note    Patient Name: Carolina Baldwin  MRN: [id number]  Admission Date: 3/28/2022  Hospital Length of Stay: 1 days  Code Status: Full Code   Attending Provider: Pawel Velez MD   Consulting Provider: John Butler MD  Primary Care Physician: Primary Doctor No  Principal Problem:Liver injury    Inpatient consult to Hepatology  Consult performed by: John Butler MD  Consult ordered by: Geoffrey Gloria MD        Subjective:     HPI:   Ms Baldwin is a 35yo PMHx s/p cholecystectomy, polysubstance use d/o (meth, opiates, tobax) initially presents as transfer from OSH on  for further evaluation of acute liver injury.     Initially presented to OSH for increasing jaundice, lethargy, AMS found to have pSBO w/ increased o2 requirements. NGT placed for SBO and initially received BSABx which were discontinued. Had elevated ammonia and was receiving lactulose and xifaxan.     Since arrival VS notable for AF, HR 110s, normotensive, RR 30 on 1L NC satting 95%. CBC w/o leukocytosis, Hgb 8.2, plts 135. BMP w/ BUN/ Cr 11/ 0.4. LFTs w/ AST/ / 79, , T bili 9.3. INR 1.4. BNP 1200. CTAP w/ contrast notable w/ SBO, large volume complex abdominopelvic fluid, b/l pleural effusions, hepatic steatosis. US liver w/ doppler w/ hepatic steatosis.    Hepatitis panel, ELIN, ASMA, ceruloplasmin pending.    Primary team is diuresing w/ lasix and surgery is following for SBO. Receiving lactulose and xifaxan as well.         Past Medical History:   Diagnosis Date    IVDU (intravenous drug user)     Opioid use     Polysubstance abuse     Tobacco abuse disorder        Past Surgical History:   Procedure Laterality Date     SECTION      LAPAROSCOPIC CHOLECYSTECTOMY         Family History   Family history unknown: Yes       Social History     Socioeconomic History    Marital status: Unknown   Tobacco Use    Smoking status: Former Smoker     Types: Cigarettes     Smokeless tobacco: Never Used    Tobacco comment: Smoking since 10 y/o, recently quit   Substance and Sexual Activity    Alcohol use: Not Currently    Drug use: Not Currently     Types: Methamphetamines       No current facility-administered medications on file prior to encounter.     No current outpatient medications on file prior to encounter.       Review of patient's allergies indicates:   Allergen Reactions    Penicillins Shortness Of Breath and Swelling       Review of Systems   Unable to perform ROS: Mental acuity        Objective:     Vitals:    03/29/22 0705   BP: (!) 131/97   Pulse: (!) 116   Resp: (!) 30   Temp: 97.9 °F (36.6 °C)       Constitutional:  not in acute distress and well developed  HENT: Head: Normal, normocephalic, atraumatic.  Eyes: conjunctiva clear and sclera icteric  Cardiovascular: regular rate and rhythm  Respiratory: normal chest expansion & respiratory effort   and no accessory muscle use  GI: soft, distended and nontender  Musculoskeletal: no muscular tenderness noted  Skin: jaundice present  Neurological: disoriented    Significant Labs:  Recent Labs   Lab 03/28/22 2127   HGB 8.2*       Lab Results   Component Value Date    WBC 9.25 03/28/2022    HGB 8.2 (L) 03/28/2022    HCT 25.3 (L) 03/28/2022     (H) 03/28/2022     (L) 03/28/2022       Lab Results   Component Value Date     03/29/2022    K 3.6 03/29/2022     03/29/2022    CO2 29 03/29/2022    BUN 11 03/29/2022    CREATININE 0.4 (L) 03/29/2022    CALCIUM 9.4 03/29/2022    ANIONGAP 10 03/29/2022    ESTGFRAFRICA >60.0 03/29/2022    EGFRNONAA >60.0 03/29/2022       Lab Results   Component Value Date    ALT 79 (H) 03/29/2022     (H) 03/29/2022    ALKPHOS 949 (H) 03/29/2022    BILITOT 9.3 (H) 03/29/2022       Lab Results   Component Value Date    INR 1.4 (H) 03/28/2022       Significant Imaging:  Reviewed pertinent radiology findings.       Assessment/Plan:     37yo PMHx s/p cholecystectomy,  polysubstance use d/o (meth, opiates, tobax) initially presents as transfer from OSH on 03/28 for further evaluation of acute liver injury.     W/u thus far notable for LFTs w/ AST/ / 79, , T bili 9.3. INR 1.4. BNP 1200. CTAP w/ contrast notable w/ SBO, large volume complex abdominopelvic fluid, b/l pleural effusions, hepatic steatosis. US liver w/ doppler w/ hepatic steatosis.    Hepatitis panel, ELIN, ASMA, ceruloplasmin pending.    Primary team is diuresing w/ lasix and surgery is following for SBO. Receiving lactulose and xifaxan as well.     Presentation concerning for cholestatic liver injury, however, no ksenia obstruction on imaging.    Problem List:  1. Acute cholestatic liver injury    Recommendations:  1    Acute hepatitis panel  2. Anti-mitochondrial Ab, anti-smooth muscle Ab, ELIN  3. Iron panel w/ iron level, TIBC, ferritin  4. Tylenol level, EtOH level, PETH  5. Alpha one antitrypsin  6. Ceruloplasmin  7. TSH/ FT4/ FT3    Thank you for involving us in the care of Carolina Baldwin. Please call with any additional questions, concerns or changes in the patient's clinical status. We will continue to follow.    John Butler MD  Gastroenterology Fellow PGY V  [facility]

## 2022-03-29 NOTE — SUBJECTIVE & OBJECTIVE
No current facility-administered medications on file prior to encounter.     No current outpatient medications on file prior to encounter.       Review of patient's allergies indicates:   Allergen Reactions    Penicillins Shortness Of Breath and Swelling       Past Medical History:   Diagnosis Date    IVDU (intravenous drug user)     Opioid use     Polysubstance abuse     Tobacco abuse disorder      Past Surgical History:   Procedure Laterality Date     SECTION      LAPAROSCOPIC CHOLECYSTECTOMY       Family History       Family history is unknown by patient.          Tobacco Use    Smoking status: Former Smoker     Types: Cigarettes    Smokeless tobacco: Never Used    Tobacco comment: Smoking since 12 y/o, recently quit   Substance and Sexual Activity    Alcohol use: Not Currently    Drug use: Not Currently     Types: Methamphetamines    Sexual activity: Not on file     Review of Systems   Constitutional:  Positive for activity change, appetite change and fatigue. Negative for chills, diaphoresis and fever.   HENT:  Positive for dental problem (all teeth removed previously d/t poor dentition). Negative for sneezing and sore throat.    Eyes:  Negative for pain and visual disturbance.   Respiratory:  Positive for shortness of breath. Negative for cough, chest tightness and wheezing.    Cardiovascular:  Negative for chest pain, palpitations and leg swelling.   Gastrointestinal:  Positive for abdominal distention. Negative for abdominal pain, blood in stool, constipation, diarrhea, nausea and vomiting.   Genitourinary:  Negative for dysuria, hematuria and urgency.   Musculoskeletal:  Negative for arthralgias and myalgias.   Skin:  Positive for color change (jaundice). Negative for rash.   Neurological:  Positive for weakness. Negative for tremors, syncope, light-headedness and numbness.   Psychiatric/Behavioral:  Negative for agitation and confusion.    Objective:     Vital Signs (Most Recent):  Temp: 97.9 °F  (36.6 °C) (03/29/22 0705)  Pulse: (!) 116 (03/29/22 0812)  Resp: (!) 29 (03/29/22 0812)  BP: (!) 131/97 (03/29/22 0705)  SpO2: 96 % (03/29/22 0812)   Vital Signs (24h Range):  Temp:  [97.9 °F (36.6 °C)-99.9 °F (37.7 °C)] 97.9 °F (36.6 °C)  Pulse:  [110-125] 116  Resp:  [25-37] 29  SpO2:  [91 %-99 %] 96 %  BP: (110-132)/(77-97) 131/97     Weight: 33.5 kg (73 lb 13.7 oz)  There is no height or weight on file to calculate BMI.    Physical Exam  Constitutional:       General: She is not in acute distress.     Appearance: She is ill-appearing. She is not toxic-appearing or diaphoretic.      Comments: Cachetic, temporal wasting, jaundiced, weak, but in no apparent distress   HENT:      Head: Normocephalic.      Nose: Nose normal.   Eyes:      General: Scleral icterus present.      Extraocular Movements: Extraocular movements intact.      Pupils: Pupils are equal, round, and reactive to light.   Cardiovascular:      Rate and Rhythm: Regular rhythm. Tachycardia present.      Pulses: Normal pulses.      Heart sounds: Normal heart sounds. No murmur heard.  Pulmonary:      Effort: Pulmonary effort is normal. No respiratory distress.      Breath sounds: Normal breath sounds. No stridor. No wheezing or rales.   Abdominal:      General: Abdomen is flat. There is distension.      Palpations: Abdomen is soft.      Tenderness: There is no abdominal tenderness. There is no guarding or rebound.   Musculoskeletal:         General: No swelling or tenderness. Normal range of motion.      Cervical back: Normal range of motion. No rigidity or tenderness.      Right lower leg: No edema.      Left lower leg: No edema.   Skin:     General: Skin is warm and dry.      Coloration: Skin is jaundiced.      Findings: Bruising and rash (petecchial rash on back) present.   Neurological:      General: No focal deficit present.      Mental Status: She is alert and oriented to person, place, and time. Mental status is at baseline.      Cranial Nerves:  No cranial nerve deficit.      Sensory: No sensory deficit.      Motor: No weakness.      Comments: Slightly sleepy, very tired, though she attributes this to long ride in uncomfortable transport   Psychiatric:         Mood and Affect: Mood normal.         Behavior: Behavior normal.         Thought Content: Thought content normal.       Significant Labs:  I have reviewed all pertinent lab results within the past 24 hours.  CBC:   Recent Labs   Lab 03/28/22 2127   WBC 9.25   RBC 2.40*   HGB 8.2*   HCT 25.3*   *   *   MCH 34.2*   MCHC 32.4     CMP:   Recent Labs   Lab 03/29/22  0310   *   CALCIUM 9.4   ALBUMIN 3.2*   PROT 5.7*      K 3.6   CO2 29      BUN 11   CREATININE 0.4*   ALKPHOS 949*   ALT 79*   *   BILITOT 9.3*     Coagulation:   Recent Labs   Lab 03/28/22 2127   LABPROT 13.9*   INR 1.4*     Cardiac markers: No results for input(s): CKMB, CPKMB, TROPONINT, TROPONINI, MYOGLOBIN in the last 168 hours.    Significant Diagnostics:  I have reviewed all pertinent imaging results/findings within the past 24 hours.

## 2022-03-29 NOTE — ASSESSMENT & PLAN NOTE
37yo female with a medical history significant for IVDU on suboxone, polysubstance use (meth, opioids, tobacco) and gastric ulcers s/p recent cholecystectomy at OSH on 3/8 who presented to [facility] as transfer to MICU for hepatology evaluation given increasing jaundice, lethargy, AM and increased oxygen requirement. General surgery consulted given concern for possible small bowel obstruction.     - Patient seen and examined, labs and imaging reviewed  - Given she is having bowel movements and passing gas, do not feel like the patient is clinically obstructed despite CT findings; however, given her current status and the fact she is likely quite fragile would recommend treating conservatively with NGT  - Recommend performing gastrograffin challenge today via NGT  - NPO for now  - Remainder of care per primary team  - Please call with questions or concerns

## 2022-03-29 NOTE — EICU
Rounding (Video Assessment):  No    Intervention Initiated From:  Bedside    Socrates Communicated with Bedside Nurse regarding:  Documentation and Time-Out    Comments: elert received for time out prior to diagnostic paracentesis.  Time out completed. Dr KEN Ramos and Dr Pollack @ bs for procedure.  LLQ puncture site, srikanth peritoneal fluid aspirated and bandaid applied to site    Procedure end 1600

## 2022-03-29 NOTE — HOSPITAL COURSE
Transferred from OSH to [facility] MICU for HLOC, hepatology evaluation, and SBO. On arrival, patient A&O x 4 with diffuse abdominal pain w/o rebound, on 5 L NC. CTAP demonstrated diffuse bowel air levels and dilatation c/w SBO. Bilateral lung effusions present with concerns for L lingula consolidation. AF VS HDS on presentation to [facility]. Bedside US without amenable ascites pocket for paracentesis. Gen Surg evaluated w/o need for surgical intervention. Started on CAP coverage for pulmonary findings. Lactulose titrated to 4-5 BM/day, rifaximin. Hepatology consulted and work up initiated. Patient desaturated on 5 L NC and subsequently stepped up to 10 L HFNC with eventual intubation. Found to be mucus plugging per CXR (L lung atelectasis with L midline shift). Levophed started during intubation, weaned off. Patient respiratory status improving. Bronch completed for mucus evacuation on 03/31. Extubated to NC with BiPAP on 04/01. Suction turned off to NGT per gen surg recs. Upgraded Cefepime coverage to Meropenem due to GNR+ Rcx while patient was intubated. Per OSH reports, there were previously concerns for esophageal candidiasis, as such, added Fluconazole. Increased airway mucus production, poor cough reflex. SLP swallow eval; NPO, TF per NGT in interim. Difficulty tolerating comfort flow trials due to poor cough reflex and inspiratory effort 2/2 deconditioning, however intermittent improvement through course. Rcx on 04/06 grew acinetobacter baumannii , pending drug sensitivities. ID following, per recommendation, discontinued meropenem without new regimen as patient clinically improving. Required 1u pRBC, macrocytic anemia 2/2 chronic disease vs malnutrition

## 2022-03-29 NOTE — ASSESSMENT & PLAN NOTE
Bilateral pleural effusions  Pleural consolidation, L    No baseline home O2 per patient. Presenting with 5 L NC O2. Unclear etiology, though previous documentation suggestive of pleural effusion vs component of PNA. Unclear etiology as patient seems to be volume down on exam. No reported history of heart failure or COPD (though extensive tobacco use history). Etiology may be 2/2 to abdominal distention causing diaphragmatic pressure vs infectious etiology vs pleural effusion d/t third spacing vs infection?    - CT demonstrating bilateral pleural effusions and lingula consolidation; concerns for possible PNA  - Cefepime and azithromycin for CAP coverage (recent hospitalization for pseudomonas coverage)  - Continuous pulse ox  - Monitor fever curve; given lack of infectious presentation, will defer abx course now  - Lactate wnl  - Monitor BCx  - RCx if sputum  - Aspiration precaution

## 2022-03-29 NOTE — HPI
3/28/2022 10:47 PM  Carolina Baldwin  1985  [id number]      ADDICTION CONSULT INITIAL EVALUATION     DEPARTMENT:  Psychiatry  SITE: [facility], [address]    DATE OF ADMISSION: 3/28/2022  6:40 PM  LENGTH OF STAY: 0 days    EXAMINING PRACTITIONER: Janice Dominique    CONSULT REQUESTED BY: Pawel Velez MD      SUBJECTIVE     CHIEF COMPLAINT  Carolina Baldwin is a 36 y.o. female who is seen today for an initial psychiatric evaluation by the addiction psychiatry consult service.  Carolina Baldwin presents as a transfer to [facility] for hepatology evaluation.      HISTORY OF PRESENT ILLNESS    Per Primary MD:  37 y/o F with PMHx IVDU on suboxone, polysubstance use (meth, opioids, tobacco), gastric ulcers initially presented to OSH for increasing jaundice, lethargy, AMS, evaluation for pSBO, increased O2 requirement (3-4 L, no baseline home O2). Was started on broad spectrum Abx at OSH and eventually discontinued. Initially presented oriented x 2 and severe jaundice, elevated ammonia, but normal LFTs? Started on lactulose and rifaximin. NGT decompression at OSH for SBO with good response, advanced to [facility]. Transferred to [facility] for hepatology evaluation.      On presentation to [facility] MICU, patient is overtly jaundiced but with intact mentation (a&o x4). Complains of lower back pain that she experienced from the long transport from OSH to [facility]. Also complains of increasing hunger and thirst (good appetite). Questionable historian, but understands the circumstance, stating she knows she is having new liver problems and is here for the hepatology team to evaluate her. Patient reports having good bowel movements and appropriate urinary voids. Specifically denies chest pain, increased work of breathing, increasing/worsening abdominal pain, fever/chills, nausea, vomiting, constipation, diarrhea, dysuria, suprapubic pain, recent IVDU (currently on suboxone), hallucinations (auditory and visual), tremors,  seizures, loss of consciousness, headache, sensory/strength changes, abnormal bleeding.     Patient is from [place], [place]. Lives with boyfriend and 5 children. Of note, she calls her boyfriend her  but is not legally ; her closest relative is her sister. Substantial IVDU history (meth), non-injectable opioid abuse. Recently stopped smoking history (smoked since 10 y/o). On suboxone per OSH report and per patient. Does not know if any active HIV, hepatitis infection. Family history of CAD, ACS, and cancer (unspecified). 35 y/o F with PMHx IVDU on suboxone, polysubstance use (meth, opioids, tobacco), gastric ulcers initially presented to OSH for increasing jaundice, lethargy, AMS, evaluation for pSBO, increased O2 requirement (3-4 L, no baseline home O2). Was started on broad spectrum Abx at OSH and eventually discontinued. Initially presented oriented x 2 and severe jaundice, elevated ammonia, but normal LFTs? Started on lactulose and rifaximin. NGT decompression at OSH for SBO with good response, advanced to [facility]. [place] [facility] [place].      On presentation to [facility], patient is overtly jaundiced but with intact mentation (a&o x4). Complains of lower back pain that she experienced from the long transport from OSH to [facility]. Also complains of increasing hunger and thirst (good appetite). Questionable historian, but understands the circumstance, stating she knows she is having new liver problems and is here for the hepatology team to evaluate her. Patient reports having good bowel movements and appropriate urinary voids. Specifically denies chest pain, increased work of breathing, increasing/worsening abdominal pain, fever/chills, nausea, vomiting, constipation, diarrhea, dysuria, suprapubic pain, recent IVDU (currently on suboxone), hallucinations (auditory and visual), tremors, seizures, loss of consciousness, headache, sensory/strength changes, abnormal bleeding.     Patient is from  [place], [place]. Lives with boyfriend and 5 children. Of note, she calls her boyfriend her  but is not legally ; her closest relative is her sister. Substantial IVDU history (meth), non-injectable opioid abuse. Recently stopped smoking history (smoked since 12 y/o). On suboxone per OSH report and per patient. Does not know if any active HIV, hepatitis infection. Family history of CAD, ACS, and cancer (unspecified).      Per Addiction Psych MD:  On initial observation, Miss Baldwin appears cachetic and severely jaundiced with increased work of breathing. She does not show signs of acute withdrawal including tremulousness or diaphoresis. Pt endorses a history of IVDU and non-injectable opioid use but confirms that she had been taking Suboxone for the past 7 years up until her most recent hospitalization. Pt endorses experiencing nausea, headaches and diarrhea when coming off the Suboxone approximately 1 month ago. Pt is not interested in re-starting the Suboxone at this time.      Pt denies any recent drug use, SI/HI, AVH, or symptoms of depression. She endorses feeling anxious since being in the hospital. Pt has no other psychiatric hx of note. She endorses using tobacco but has not smoked since being hospitalized. She is currently unemployed and lives with her partner in [place], [place]. Pt has 5 children, the youngest of which is 5 months old. Pt endorses that her sister is currently caring for the children in [place].     Review of : Pt has been receiving Suboxone 8-2mg since at least 10/2020.  No additional opioids or controlled substances noted.  She last filled suboxone on 3/3/22 and received a 9 day supply.      SUBSTANCE ABUSE HISTORY  Substance(s) of Choice: prior use of pain pills  Substances Used: methamphetamine, opioids  History of IVDU?: yes  Use of Alcohol: denies  Average Consumption: n/a  Last Drink: n/a  Use of Medications for Alcohol/Opioid Use Disorder: Suboxone   History of Complicated  Withdrawal: denies  History of Detox: in hospital  Rehab History: denies  AA/NA involvement: denies  Tobacco: yes  Spouse/Partner Consumption: yes  Patient Aware of Biomedical Complications: yes    DSM-5 SUBSTANCE USE DISORDER CRITERIA   Mild (1-3), Moderate (4-5), Severe (?6)  Often take in larger amounts or over a longer period of time than was intended.  Persistent desire or unsuccessful efforts to cut down or control use.  Great deal of time spent in activities necessary to obtain substance, use, or recover from effects.  Craving/strong desire for substance or urge to use.  Use resulting in failure to fulfill major role obligations at home, work or school.  Social, occupational, recreational activities decreased because of use.  Continued use despite having persistent or recurrent social or interpersonal problems cause or exaserbated by the substance.  Recurrent use in situations in which it is physically hazardous.  Use despite physical or psychological problems that are likely to have been caused or exacerbated by the substance.  Tolerance, as defined by either of the following.   A. A need for markedly increased amounts of substance to achieve intoxication or desired effect. -OR-    B. A markedly diminished effect with continued use of the same amount of substance.  Withdrawal, as manifested by the following.   A. The characteristic withdrawal syndrome for substance. -AND-   B. Substance is taken to relieve or avoid withdrawal symptoms.    ARE THE CRITERIA MET FOR DSM-5 SUBSTANCE USE DISORDER: previously severe      PSYCHIATRIC HISTORY  Reported Diagnose(s): hx opioid use disorder  Previous Medication Trials: suboxone  Previous Psychiatric Hospitalizations: denies  Outpatient Psychiatrist?: denies      SUICIDE/HOMICIDE RISK ASSESSMENT  Current/active suicidal ideation/plan/intent: denies  Previous suicide attempts: denies  Current/active homicidal ideation/plan/intent: denies      HISTORY OF TRAUMA, ABUSE &  VIOLENCE  Trauma: attempted, unable to assess at this time  Physical Abuse: attempted, unable to assess at this time  Sexual Abuse: attempted, unable to assess at this time  Violent Conduct: attempted, unable to assess at this time    Access to Gun: attempted, unable to assess at this time      FAMILY PSYCHIATRIC HISTORY   Denies    SOCIAL HISTORY  Lives with boyfriend and 5 children      PAST MEDICAL HISTORY   - IVDU (meth)  - Opioid use  - Polysubstance abuse  - Tobacco abuse disorder   - Acute hypoxemic respiratory failure  - Paroxysmal tachycardia  - SBO  - Cholecystectomy  - Liver failure, acute hepatic metabolic encephalopathy   - Failure to thrive (temporal wasting, malnutrition)      PSYCHOSOCIAL FACTORS  Stressors (Psychosocial and Environmental): health.       PSYCHIATRIC ROS  Denies withdrawal symptoms, denies SI/HI/AVH    MEDICAL ROS    Complete review of systems performed covering Constitutional, Eyes, ENT/Mouth, Cardiovascular, Respiratory, Gastrointestinal, Genitourinary, Musculoskeletal, Skin, Neurologic, Endocrine, Heme/Lymph, and Allergy/Immune.     Complete review of systems was negative with the exception of the following positive symptoms: fatigue, weakness      ALLERGIES  Penicillins      MEDICATIONS    Psychotropics:  none    Infusions:      Scheduled:   aluminum-magnesium hydroxide-simethicone  30 mL Oral QID (AC & HS)    famotidine  20 mg Oral BID    lactulose  20 g Oral BID    mupirocin   Nasal BID    nicotine  1 patch Transdermal Daily    rifAXImin  550 mg Oral BID       PRN:  melatonin, ondansetron, sodium chloride 0.9%    Home Medications:  Prior to Admission medications    Not on File      Patient seen by resident and medical student, below is medical student's documentation with additions and edits made where appropriate.

## 2022-03-29 NOTE — ASSESSMENT & PLAN NOTE
Opioid abuse  Tobacco abuse  Methamphetamine abuse    Noted to be on suboxone through suboxone clinic. Unclear historical path.    - Addiction Psych consult for help ascertaining appropriateness of suboxone  - Urine toxicology  - HIV  - Hep panel

## 2022-03-29 NOTE — ASSESSMENT & PLAN NOTE
Presenting with tachycardia to 120s. Noted in OSH previous progress note and was tempered with BB. Unclear etiology, potentially volume down driving sinus tachycardia as patient has been on NGT suction d/t pSBO with limited PO intake. Also appears to be extremely malnourished.     - Repeat EKG  - Cardiac telemetry  - Electrolyte trend, K > 4, Mg > 2  - Will hold trial of IVF bolus until further evaluation regarding volume status returns  - Consider TTE for further evaluation

## 2022-03-29 NOTE — PT/OT/SLP EVAL
"Occupational Therapy   Evaluation w PT  Co-treat performed due to acuity and complexity of pt's medical status with 2 skilled disciplines needed to optimize pts functional performance in ICU setting.    Name: Carolina Baldwin  MRN: [id number]  Admitting Diagnosis:  Liver injury    Length of Stay: 1 days    Recommendations:     Discharge Recommendations: home health OT  Discharge Equipment Recommendations:  3-in-1 commode, shower chair  Barriers to discharge:  Other (Comment) (impaired activity endurance)    Plan:     Patient to be seen 3 x/week to address the above listed problems via self-care/home management, therapeutic activities, therapeutic exercises  · Plan of Care Expires: 04/28/22  · Plan of Care Reviewed with: patient    Assessment:     Carolina Baldwin is a 36 y.o. female with a medical diagnosis of Liver injury.  She presents with the following performance deficits affecting function: weakness, impaired endurance, impaired self care skills, impaired functional mobilty, gait instability, impaired balance, decreased upper extremity function, decreased lower extremity function, decreased safety awareness, pain, impaired cardiopulmonary response to activity.      Additional mobility deferred this date 2* pt with frequent loose BM, RN aware. Once patient is medically stable, patient is safe to discharge home with continued OT services via HHOT.     Rehab Prognosis: Good; patient would benefit from acute skilled OT services to address these deficits and reach maximum level of function.       Subjective   Communicated with: RN prior to session.  Patient found HOB elevated with bed alarm, blood pressure cuff, peripheral IV, pulse ox (continuous), telemetry, brown catheter, NG tube, oxygen upon OT entry to room.    Chief Complaint: No chief complaint on file.    Patient/Family Comments/goals: "I just noticed my breathing was getting harder" referring to functional decline prior to admit.     Pain/Comfort:  · Pain " Rating 1: 0/10  · Pain Rating Post-Intervention 1: 0/10    Patients cultural, spiritual, Voodoo conflicts given the current situation: no    Occupational Profile:  Living Environment: pt lives with partner and 5 children (age range 10 year -7 month) in [facility], 3STE w B handrail, tubshower. Pt used step stool as makeshift shower chair.   Prior Level of Function: Patient reports being IND with mobility & with ADLs.   Patient uses DME as follows: rollator.   DME owned (not currently used): none.  Roles/Repsonsibilities:   Hand Dominance: right   Work: no.   Drive: partner drives.   Managing Medicines/Managing Home: yes.   Hobbies: none stated.  Equipment Used at Home:  rollator    Patient reports they will have assistance from sister, partner upon discharge.  Note; Partner currently home on workman's comp 2* injury at work; pt states partner is limited by pain.       Objective:     Patient found with: bed alarm, blood pressure cuff, peripheral IV, pulse ox (continuous), telemetry, brown catheter, NG tube, oxygen   General Precautions: Standard, Cardiac fall   Orthopedic Precautions:N/A   Braces: N/A   Respiratory Status:    Nasal cannula, flow 4 L/min  Vitals: BP (!) 131/97 (BP Location: Right arm, Patient Position: Lying)   Pulse (!) 116   Temp 97.9 °F (36.6 °C) (Oral)   Resp (!) 29   Ht 5' (1.524 m)   Wt 33.5 kg (73 lb 13.7 oz)   SpO2 96%   BMI 14.42 kg/m²     Cognitive and Psychosocial Function:   · AxOx4 --    · Follows Commands/attention:easily distracted by loose BM and fatigue and follows one-step commands  · Communication:  clear/fluent  · Memory: No Deficits noted  · Safety awareness/insight to disability: intact   · Mood/Affect/Coping skills/emotional control: Appropriate to situation    Hearing: Intact    Vision:  Intact visual fields    Physical Exam:  Postural examination/scapula alignment:    -       Rounded shoulders  -       Forward head   -       Cachetic  Skin integrity: Visible skin intact,  Thin, jaundice coloration    BUE WFL for strength, sensation, GM/FM for use during functional tasks.  Pt is R handed.      Occupational Performance:  Bed Mobility:    · Patient completed Rolling/Turning to Left with  stand by assistance  · Patient completed Rolling/Turning to Right with stand by assistance  · Scooting to HOB in supine: stand by assistance  · Patient completed Supine to Sit with stand by assistance on R side of bed  · Scooting anteriorly to EOB to have both feet planted on floor: stand by assistance  · Patient completed Sit to Supine with stand by assistance on R side of bed    Functional Mobility/Transfers:   Static Sitting EOB: supervision completing seated ADLs   Dynamic Sitting EOB: SBA  Additional mobility deferred 2* active diarrhea and pt fatigue seated EOB.     Activities of Daily Living:  · Feeding:  modified independence set up seated EOB; BUE WFL  · Grooming modified independence set up seated EOB; BUE WFL  · Upper Body Dressing modified independence set up seated EOB; BUE WFL  · Lower Body Dressing: modified independence set up seated EOB; BUE WFL  · Toileting: minimum assistance pt WFL to complete posterior daniel hygeine; therapist completed 2* volume of diarrhea. Rn aware.       AMPA 6 Click ADL:  AMPAC Total Score: 20    Treatment & Education:  -OT POC, safety during ADLs and mobility   -Education on energy conservation and task modification to maximize safety and independence  -Questions answered within OT scope of practice.      Patient left supine with all lines intact, call button in reach and RN notified    GOALS:   Multidisciplinary Problems     Occupational Therapy Goals     Not on file                History:     Past Medical History:   Diagnosis Date    IVDU (intravenous drug user)     Opioid use     Polysubstance abuse     Tobacco abuse disorder        Past Surgical History:   Procedure Laterality Date     SECTION      LAPAROSCOPIC CHOLECYSTECTOMY         Time  Tracking:       OT Date of Treatment: 03/29/22  OT Start Time: 1030  OT Stop Time: 1056  OT Total Time (min): 26 min  Additional staff present: PT      Billable Minutes:Evaluation 10  Self Care/Home Management 16      3/29/2022

## 2022-03-29 NOTE — ASSESSMENT & PLAN NOTE
Opioid abuse  Tobacco abuse  Methamphetamine abuse    Noted to be on suboxone through suboxone clinic. Unclear historical path.    - Addiction Psych consult for suboxone; per their report, patient is not on suboxone regularly  - Urine toxicology negative at this time  - HIV pending  - Hep panel pending

## 2022-03-29 NOTE — PROGRESS NOTES
[facility] - Cardiac Medical ICU  Critical Care Medicine  Progress Note    Patient Name: Carolina Baldwin  MRN: [id number]  Admission Date: 3/28/2022  Hospital Length of Stay: 1 days  Code Status: Full Code  Attending Provider: Pawel Velez MD  Primary Care Provider: Primary Doctor No   Principal Problem: Liver injury    Subjective:     HPI:  35 y/o F with PMHx IVDU on suboxone, polysubstance use (meth, opioids, tobacco), gastric ulcers initially presented to OSH for increasing jaundice, lethargy, AMS, evaluation for pSBO, increased O2 requirement (3-4 L, no baseline home O2). Was started on broad spectrum Abx at OSH and eventually discontinued. Initially presented oriented x 2 and severe jaundice, elevated ammonia, but normal LFTs? Started on lactulose and rifaximin. NGT decompression at OSH for SBO with good response, advanced to CLD. Transferred to [facility] for hepatology evaluation.     On presentation to [facility] MICU, patient is overtly jaundiced but with intact mentation (a&o x4). Complains of lower back pain that she experienced from the long transport from OSH to [facility]. Also complains of increasing hunger and thirst (good appetite). Questionable historian, but understands the circumstance, stating she knows she is having new liver problems and is here for the hepatology team to evaluate her. Patient reports having good bowel movements and appropriate urinary voids. Specifically denies chest pain, increased work of breathing, increasing/worsening abdominal pain, fever/chills, nausea, vomiting, constipation, diarrhea, dysuria, suprapubic pain, recent IVDU (currently on suboxone), hallucinations (auditory and visual), tremors, seizures, loss of consciousness, headache, sensory/strength changes, abnormal bleeding.    Patient is from [place], [place]. Lives with boyfriend and 5 children. Of note, she calls her boyfriend her  but is not legally ; her closest relative is her sister. Substantial IVDU history (meth),  non-injectable opioid abuse. Recently stopped smoking history (smoked since 12 y/o). On suboxone per OSH report and per patient. Does not know if any active HIV, hepatitis infection. Family history of CAD, ACS, and cancer (unspecified).       Hospital/ICU Course:  Transferred [facility] for HLOC, hepatology evaluation, and SBO. On arrival, patient A&O x 4 with diffuse abdominal pain w/o rebound, on 5 L NC. CTAP demonstrated diffuse bowel air levels and dilatation c/w SBO. Bilateral lung effusions present with concerns for L lingula consolidation. AF VS HDS on presentation to [facility]. Bedside US without amenable ascites pocket for paracentesis. Gen Surg evaluated w/o need for surgical intervention. Started on CAP coverage for pulmonary findings. Lactulose titrated to 4-5 BM/day, rifaximin. Hepatology consulted and work up initiated.    Likely ready for step down to hospital medicine.      Interval History/Significant Events: AF HDS. Patient still tachycardic to 110s-120s. Patient continues to be A&O x 4 without asterixis, tongue fasciculations. Slightly more fatigued, but still appropriately responsive.     Review of Systems   Constitutional:  Positive for activity change, appetite change and fatigue. Negative for chills, diaphoresis and fever.   HENT:  Positive for dental problem (all teeth removed previously d/t poor dentition). Negative for sneezing and sore throat.    Eyes:  Negative for pain and visual disturbance.   Respiratory:  Positive for shortness of breath. Negative for cough, chest tightness and wheezing.    Cardiovascular:  Negative for chest pain, palpitations and leg swelling.   Gastrointestinal:  Positive for abdominal distention. Negative for abdominal pain, blood in stool, constipation, diarrhea, nausea and vomiting.   Genitourinary:  Negative for dysuria, hematuria and urgency.   Musculoskeletal:  Negative for arthralgias and myalgias.   Skin:  Positive for color change (jaundice). Negative  for rash.   Neurological:  Positive for weakness. Negative for tremors, syncope, light-headedness and numbness.   Psychiatric/Behavioral:  Negative for agitation and confusion.    Objective:     Vital Signs (Most Recent):  Temp: 97.9 °F (36.6 °C) (03/29/22 0705)  Pulse: (!) 116 (03/29/22 0812)  Resp: (!) 29 (03/29/22 0812)  BP: (!) 131/97 (03/29/22 0705)  SpO2: 96 % (03/29/22 0812)   Vital Signs (24h Range):  Temp:  [97.9 °F (36.6 °C)-98.2 °F (36.8 °C)] 97.9 °F (36.6 °C)  Pulse:  [110-120] 116  Resp:  [26-37] 29  SpO2:  [91 %-99 %] 96 %  BP: (110-132)/(77-97) 131/97   Weight: 33.5 kg (73 lb 13.7 oz)  Body mass index is 14.42 kg/m².      Intake/Output Summary (Last 24 hours) at 3/29/2022 1259  Last data filed at 3/29/2022 0900  Gross per 24 hour   Intake --   Output 2475 ml   Net -2475 ml       Physical Exam  Constitutional:       General: She is not in acute distress.     Appearance: She is ill-appearing. She is not toxic-appearing or diaphoretic.      Comments: Cachetic, temporal wasting, jaundiced, weak, but in no apparent distress   HENT:      Head: Normocephalic.      Nose: Nose normal.   Eyes:      General: Scleral icterus present.      Extraocular Movements: Extraocular movements intact.      Pupils: Pupils are equal, round, and reactive to light.   Cardiovascular:      Rate and Rhythm: Regular rhythm. Tachycardia present.      Pulses: Normal pulses.      Heart sounds: Normal heart sounds. No murmur heard.  Pulmonary:      Effort: Pulmonary effort is normal. No respiratory distress.      Breath sounds: Normal breath sounds. No stridor. No wheezing or rales.   Abdominal:      General: Abdomen is flat. There is distension.      Palpations: Abdomen is soft.      Tenderness: There is abdominal tenderness (diffuse tenderness, intermittently). There is no guarding or rebound.   Musculoskeletal:         General: No swelling or tenderness. Normal range of motion.      Cervical back: Normal range of motion. No rigidity  or tenderness.      Right lower leg: No edema.      Left lower leg: No edema.   Skin:     General: Skin is warm and dry.      Coloration: Skin is jaundiced.      Findings: Bruising and rash (petecchial rash on back) present.   Neurological:      General: No focal deficit present.      Mental Status: She is alert and oriented to person, place, and time. Mental status is at baseline.      Cranial Nerves: No cranial nerve deficit.      Sensory: No sensory deficit.      Motor: No weakness.      Comments: Slightly sleepy, very tired, though she attributes this to long ride in uncomfortable transport   Psychiatric:         Mood and Affect: Mood normal.         Behavior: Behavior normal.         Thought Content: Thought content normal.       Vents:     Lines/Drains/Airways       Peripherally Inserted Central Catheter Line  Duration             PICC Triple Lumen left basilic -- days              Drain  Duration                  Urethral Catheter 03/28/22 1848 Latex <1 day                  Significant Labs:    CBC/Anemia Profile:  Recent Labs   Lab 03/28/22 2127 03/29/22  0820   WBC 9.25 10.08   HGB 8.2* 8.7*   HCT 25.3* 27.9*   * 158   * 110*   RDW 27.6* 27.7*        Chemistries:  Recent Labs   Lab 03/28/22 2127 03/29/22  0310    139   K 3.7 3.6    100   CO2 27 29   BUN 11 11   CREATININE 0.4* 0.4*   CALCIUM 8.8 9.4   ALBUMIN 3.1* 3.2*   PROT 5.3* 5.7*   BILITOT 9.3* 9.3*   ALKPHOS 882* 949*   ALT 71* 79*   * 106*   MG 1.7 1.7   PHOS 3.7 4.0       All pertinent labs within the past 24 hours have been reviewed.    Significant Imaging:  I have reviewed all pertinent imaging results/findings within the past 24 hours.      ABG  No results for input(s): PH, PO2, PCO2, HCO3, BE in the last 168 hours.  Assessment/Plan:     Psychiatric  Polysubstance abuse  Opioid abuse  Tobacco abuse  Methamphetamine abuse    Noted to be on suboxone through suboxone clinic. Unclear historical path.    - Addiction  Psych consult for suboxone; per their report, patient is not on suboxone regularly  - Urine toxicology negative at this time  - HIV pending  - Hep panel pending    Pulmonary  Acute hypoxemic respiratory failure  Bilateral pleural effusions  Pleural consolidation, L    No baseline home O2 per patient. Presenting with 5 L NC O2. Unclear etiology, though previous documentation suggestive of pleural effusion vs component of PNA. Unclear etiology as patient seems to be volume down on exam. No reported history of heart failure or COPD (though extensive tobacco use history). Etiology may be 2/2 to abdominal distention causing diaphragmatic pressure vs infectious etiology vs pleural effusion d/t third spacing vs infection?    - CT demonstrating bilateral pleural effusions and lingula consolidation; concerns for possible PNA  - Cefepime and azithromycin for CAP coverage (recent hospitalization for pseudomonas coverage)  - Continuous pulse ox  - Monitor fever curve; given lack of infectious presentation, will defer abx course now  - Lactate wnl  - Monitor BCx  - RCx if sputum  - Aspiration precaution    Cardiac/Vascular  Paroxysmal tachycardia  Presenting with tachycardia to 120s. Noted in OSH previous progress note and was tempered with BB. Unclear etiology, potentially volume down driving sinus tachycardia as patient has been on NGT suction d/t pSBO with limited PO intake. Also appears to be extremely malnourished. In setting of potential acute pulmonary process, may be related to acute infection.    - Repeat EKG demonstrating sinus tachycardia and abnormal R wave progression; no previous EKG for comparison  - Cardiac telemetry  - Electrolyte trend, K > 4, Mg > 2  - Will hold trial of IVF bolus until further evaluation regarding volume status (BNP elevated 1000+ on admission)  - TTE pending      GI  Generalized abdominal pain  H/o pSBO  H/o laparoscopic cholecystectomy    Patient with recent h/o pSBO at OSH,with good  response to NGT decompression. Advanced to CLD. Initially complaining of RUQ abdominal pain at OSH on initial presentation. Now with mild, diffuse abdominal pain on palpation, but none at rest. Abdomen remains distended. Hypoactive bowel sounds.    - Repeat KUB / CTAP demonstrating significant bowel dilatation aw/ air-fluid levels c/w SBO  - Monitor BM, diet tolerance (currently with both BM and diet tolerance)  - FLD, ADAT  - General surgery evaluated, no role for surgical intervention  - Bedside US abdomen without note for ascites or other process  - Serial abdominal exams    Orthopedic  * Liver injury  Acute hepatic metabolic encephalopathy  Elevated transaminases  Elevated total bilirubin    37 y/o F with PMHx IVDU, polysubstance abuse [place] [facility] [place] [facility] for hepatology evaluation. Initially presented with concerns for acute liver failure (encephalopathy, elevated elevated elevation of LFTs, jaundice) vs pSBO vs sepsis 2/2 abdominal infection. NGT decompression of pSBO with good response, advanced to CLD. Started on broad spectrum abx and tapered. Started on lactulose and rifaximin with good response of encephalopathy, now A&O x 4 without signs of asterixis, tongue fasciculations, hallucinations. [place] [facility] [place].    - Hepatology consulted and following; appreciate assistance  - Acute hepatitis panel pending  - HIV screen pending  - US liver doppler / abdomen did not demonstrate appreciable ascites or biliary duct dilatation  - Lactulose 20 BID (titrate to 4-5 BMs per day)  - Rifaximin  - Ceruloplasmin, Copper pending  - ELIN, Anti-Sm pending  - PT/INR daily  - Trend CMP daily  - Trend CBC daily  - Monitor neurological status    Other  Failure to thrive in adult  Temporal wasting  Malnutrition    - Boost when tolerated     Critical Care Daily Checklist:    A: Awake: RASS Goal/Actual Goal:    Actual: Wan Agitation Sedation Scale  (RASS): Alert and calm   B: Spontaneous Breathing Trial Performed?     C: SAT & SBT Coordinated?  N/A                      D: Delirium: CAM-ICU Overall CAM-ICU: Negative   E: Early Mobility Performed? Yes   F: Feeding Goal:    Status:     Current Diet Order   Procedures    Diet full liquid      AS: Analgesia/Sedation N/A   T: Thromboembolic Prophylaxis Lovenox PPx   H: HOB > 300 Yes   U: Stress Ulcer Prophylaxis (if needed) Pepcid   G: Glucose Control N/A   B: Bowel Function Stool Occurrence: 1   I: Indwelling Catheter (Lines & Perez) Necessity PICC L basilic   D: De-escalation of Antimicrobials/Pharmacotherapies N/A    Plan for the day/ETD Step down to Hospital Medicine    Code Status:  Family/Goals of Care: Full Code         Critical secondary to Patient has an abrupt change in neurologic status: Hepatic encephalopathy      Critical care was time spent personally by me on the following activities: 40 min development of treatment plan with patient or surrogate and bedside caregivers, discussions with consultants, evaluation of patient's response to treatment, examination of patient, ordering and performing treatments and interventions, ordering and review of laboratory studies, ordering and review of radiographic studies, pulse oximetry, re-evaluation of patient's condition. This critical care time did not overlap with that of any other provider or involve time for any procedures.     Geoffrey Gloria MD  Critical Care Medicine  [facility] - Cardiac Medical ICU

## 2022-03-29 NOTE — PROCEDURES
Carolina Baldwin is a 36 y.o. female patient.    Temp: 97.9 °F (36.6 °C) (22 07)  Pulse: (!) 116 (22 08)  Resp: (!) 29 (22 08)  BP: (!) 131/97 (22 0705)  SpO2: 96 % (22)  Weight: 33.5 kg (73 lb 13.7 oz) (22)  Height: 5' (152.4 cm) (22 1007)       Paracentesis    Date/Time: 3/29/2022 4:05 PM  Location procedure was performed: [facility]  Performed by: Cici Ramos DO  Authorized by: Cici Ramos DO   Assisting provider: Krishna Pollack MD  Consent Done: Yes  Consent: Verbal consent obtained. Written consent obtained.  Consent given by: patient  Patient understanding: patient states understanding of the procedure being performed  Patient consent: the patient's understanding of the procedure matches consent given  Procedure consent: procedure consent matches procedure scheduled  Relevant documents: relevant documents present and verified  Site marked: the operative site was marked  Patient identity confirmed: , MRN and name  Initial or subsequent exam: initial  Procedure purpose: diagnostic  Indications: new onset ascites and secondary bacterial peritonitis  Anesthesia: local infiltration    Anesthesia:  Local Anesthetic: lidocaine 1% without epinephrine    Patient sedated: no  Preparation: Patient was prepped and draped in the usual sterile fashion.  Needle gauge: 18  Ultrasound guidance: yes  Puncture site: left lower quadrant  Fluid removed: 30(ml)  Fluid appearance: clear (srikanth)  Complications: No  Patient tolerance: Patient tolerated the procedure well with no immediate complications        Cici Ramos D.O  Internal Medicine PGY-2  [facility]   3/29/2022.

## 2022-03-29 NOTE — HPI
35 y/o F with PMHx IVDU on suboxone, polysubstance use (meth, opioids, tobacco), gastric ulcers initially presented to OSH for increasing jaundice, lethargy, AMS, evaluation for pSBO, increased O2 requirement (3-4 L, no baseline home O2). Was started on broad spectrum Abx at OSH and eventually discontinued. Initially presented oriented x 2 and severe jaundice, elevated ammonia, but normal LFTs? Started on lactulose and rifaximin. NGT decompression at OSH for SBO with good response, advanced to CLD. Transferred to [facility] for hepatology evaluation.     On presentation to [facility] MICU, patient is overtly jaundiced but with intact mentation (a&o x4). Complains of lower back pain that she experienced from the long transport from OSH to [facility]. Also complains of increasing hunger and thirst (good appetite). Questionable historian, but understands the circumstance, stating she knows she is having new liver problems and is here for the hepatology team to evaluate her. Patient reports having good bowel movements and appropriate urinary voids. Specifically denies chest pain, increased work of breathing, increasing/worsening abdominal pain, fever/chills, nausea, vomiting, constipation, diarrhea, dysuria, suprapubic pain, recent IVDU (currently on suboxone), hallucinations (auditory and visual), tremors, seizures, loss of consciousness, headache, sensory/strength changes, abnormal bleeding.    Patient is from [place], [place]. Lives with boyfriend and 5 children. Of note, she calls her boyfriend her  but is not legally ; her closest relative is her sister. Substantial IVDU history (meth), non-injectable opioid abuse. Recently stopped smoking history (smoked since 10 y/o). On suboxone per OSH report and per patient. Does not know if any active HIV, hepatitis infection. Family history of CAD, ACS, and cancer (unspecified).

## 2022-03-29 NOTE — ASSESSMENT & PLAN NOTE
Acute hepatic metabolic encephalopathy  Elevated transaminases  Elevated total bilirubin    37 y/o F with PMHx IVDU, polysubstance abuse presenting to [facility] as transfer from [facility] for hepatology evaluation. Initially presented with concerns for acute liver failure (encephalopathy, elevated elevated elevation of LFTs, jaundice) vs pSBO vs sepsis 2/2 abdominal infection. NGT decompression of pSBO with good response, advanced to CLD. Started on broad spectrum abx and tapered. Started on lactulose and rifaximin with good response of encephalopathy, now A&O x 4 without signs of asterixis, tongue fasciculations, hallucinations. Transferred to [facility] for further hepatological evaluation.    - Hepatology consult  - Acute hepatitis panel  - HIV screen  - US liver doppler / abdomen  - Restart Lactulose 20 BID (titrate to 3-4 BMs per day)  - Restart Rifaximin  - Ceruloplasmin, Copper  - ELIN, Anti-Sm  - PT/INR  - Trend CMP  - Trend CBC  - Monitor neurological status

## 2022-03-29 NOTE — PLAN OF CARE
Problem: Physical Therapy  Goal: Physical Therapy Goal  Description: Goals to be met by: 22    Patient will increase functional independence with mobility by performin. Supine to sit with Stand-by Assistance  2. Sit to stand transfer with Contact Guard Assistance  3. Gait  x 100 feet with Contact Guard Assistance using AD if needed. .   4. Ascend/descend 3 stair with bilateral Handrails Contact Guard Assistance   5. Lower extremity exercise program x15 reps with assistance as needed to increase tolerance to activities.     Outcome: Ongoing, Progressing   Evaluation completed and goals appropriate. 3/29/2022

## 2022-03-29 NOTE — H&P
[facility]  Critical Care Medicine  History & Physical    Patient Name: Carolina Baldwin  MRN: [id number]  Admission Date: 3/28/2022  Hospital Length of Stay: 0 days  Code Status: Full Code  Attending Physician: Pawel Velez MD   Primary Care Provider: Primary Doctor No   Principal Problem: Liver injury    Subjective:     HPI:  35 y/o F with PMHx IVDU on suboxone, polysubstance use (meth, opioids, tobacco), gastric ulcers initially presented to OSH for increasing jaundice, lethargy, AMS, evaluation for pSBO, increased O2 requirement (3-4 L, no baseline home O2). Was started on broad spectrum Abx at OSH and eventually discontinued. Initially presented oriented x 2 and severe jaundice, elevated ammonia, but normal LFTs? Started on lactulose and rifaximin. NGT decompression at OSH for SBO with good response, advanced to CLD. Transferred to [facility] for hepatology evaluation.     On presentation to [facility] MICU, patient is overtly jaundiced but with intact mentation (a&o x4). Complains of lower back pain that she experienced from the long transport from OSH to [facility]. Also complains of increasing hunger and thirst (good appetite). Questionable historian, but understands the circumstance, stating she knows she is having new liver problems and is here for the hepatology team to evaluate her. Patient reports having good bowel movements and appropriate urinary voids. Specifically denies chest pain, increased work of breathing, increasing/worsening abdominal pain, fever/chills, nausea, vomiting, constipation, diarrhea, dysuria, suprapubic pain, recent IVDU (currently on suboxone), hallucinations (auditory and visual), tremors, seizures, loss of consciousness, headache, sensory/strength changes, abnormal bleeding.    Patient is from [place], [place]. Lives with boyfriend and 5 children. Of note, she calls her boyfriend her  but is not legally ; her closest relative is her sister. Substantial IVDU history  (meth), non-injectable opioid abuse. Recently stopped smoking history (smoked since 12 y/o). On suboxone per OSH report and per patient. Does not know if any active HIV, hepatitis infection. Family history of CAD, ACS, and cancer (unspecified).       Hospital/ICU Course:  No notes on file     Past Medical History:   Diagnosis Date    IVDU (intravenous drug user)     Opioid use     Polysubstance abuse     Tobacco abuse disorder        Past Surgical History:   Procedure Laterality Date     SECTION      LAPAROSCOPIC CHOLECYSTECTOMY         Review of patient's allergies indicates:   Allergen Reactions    Penicillins Shortness Of Breath and Swelling       Family History       Family history is unknown by patient.          Tobacco Use    Smoking status: Former Smoker     Types: Cigarettes    Smokeless tobacco: Never Used    Tobacco comment: Smoking since 12 y/o, recently quit   Substance and Sexual Activity    Alcohol use: Not Currently    Drug use: Not Currently     Types: Methamphetamines    Sexual activity: Not on file      Review of Systems   Constitutional:  Positive for activity change, appetite change and fatigue. Negative for chills, diaphoresis and fever.   HENT:  Positive for dental problem (all teeth removed previously d/t poor dentition). Negative for sneezing and sore throat.    Eyes:  Negative for pain and visual disturbance.   Respiratory:  Positive for shortness of breath. Negative for cough, chest tightness and wheezing.    Cardiovascular:  Negative for chest pain, palpitations and leg swelling.   Gastrointestinal:  Positive for abdominal distention. Negative for abdominal pain, blood in stool, constipation, diarrhea, nausea and vomiting.   Genitourinary:  Negative for dysuria, hematuria and urgency.   Musculoskeletal:  Negative for arthralgias and myalgias.   Skin:  Positive for color change (jaundice). Negative for rash.   Neurological:  Positive for weakness. Negative for tremors,  syncope, light-headedness and numbness.   Psychiatric/Behavioral:  Negative for agitation and confusion.    Objective:     Vital Signs (Most Recent):  Temp: 98 °F (36.7 °C) (03/28/22 1900)  Pulse: (!) 117 (03/28/22 1900)  Resp: (!) 0 (03/28/22 1900)  BP: (!) 129/90 (03/28/22 1900)  SpO2: 98 % (03/28/22 1900)   Vital Signs (24h Range):  Temp:  [98 °F (36.7 °C)-99.9 °F (37.7 °C)] 98 °F (36.7 °C)  Pulse:  [117-125] 117  Resp:  [0-37] 0  SpO2:  [96 %-98 %] 98 %  BP: (116-132)/(90-96) 129/90   Weight: (P) 35.5 kg (78 lb 4.2 oz)  There is no height or weight on file to calculate BMI.    No intake or output data in the 24 hours ending 03/28/22 2003    Physical Exam  Constitutional:       General: She is not in acute distress.     Appearance: She is ill-appearing. She is not toxic-appearing or diaphoretic.      Comments: Cachetic, temporal wasting, jaundiced, weak, but in no apparent distress   HENT:      Head: Normocephalic.      Nose: Nose normal.   Eyes:      General: Scleral icterus present.      Extraocular Movements: Extraocular movements intact.      Pupils: Pupils are equal, round, and reactive to light.   Cardiovascular:      Rate and Rhythm: Regular rhythm. Tachycardia present.      Pulses: Normal pulses.      Heart sounds: Normal heart sounds. No murmur heard.  Pulmonary:      Effort: Pulmonary effort is normal. No respiratory distress.      Breath sounds: Normal breath sounds. No stridor. No wheezing or rales.   Abdominal:      General: Abdomen is flat. There is distension.      Palpations: Abdomen is soft.      Tenderness: abdominal tenderness (diffuse tenderness, intermittently) There is no guarding or rebound.   Musculoskeletal:         General: No swelling or tenderness. Normal range of motion.      Cervical back: Normal range of motion. No rigidity or tenderness.      Right lower leg: No edema.      Left lower leg: No edema.   Skin:     General: Skin is warm and dry.      Coloration: Skin is jaundiced.       Findings: Bruising and rash (petecchial rash on back) present.   Neurological:      General: No focal deficit present.      Mental Status: She is alert and oriented to person, place, and time. Mental status is at baseline.      Cranial Nerves: No cranial nerve deficit.      Sensory: No sensory deficit.      Motor: No weakness.      Comments: Slightly sleepy, very tired, though she attributes this to long ride in uncomfortable transport   Psychiatric:         Mood and Affect: Mood normal.         Behavior: Behavior normal.         Thought Content: Thought content normal.       Vents:     Lines/Drains/Airways       Drain  Duration                  Urethral Catheter 03/28/22 1848 Latex <1 day                  Significant Labs:    CBC/Anemia Profile:  No results for input(s): WBC, HGB, HCT, PLT, MCV, RDW, IRON, FERRITIN, RETIC, FOLATE, [id number], OCCULTBLOOD in the last 48 hours.     Chemistries:  No results for input(s): NA, K, CL, CO2, BUN, CREATININE, CALCIUM, ALBUMIN, PROT, BILITOT, ALKPHOS, ALT, AST, GLUCOSE, MG, PHOS in the last 48 hours.    All pertinent labs within the past 24 hours have been reviewed.    Significant Imaging: I have reviewed all pertinent imaging results/findings within the past 24 hours.    Assessment/Plan:     Psychiatric  Polysubstance abuse  Opioid abuse  Tobacco abuse  Methamphetamine abuse    Noted to be on suboxone through suboxone clinic. Unclear historical path.    - Addiction Psych consult for help ascertaining appropriateness of suboxone  - Urine toxicology  - HIV  - Hep panel    Pulmonary  Acute hypoxemic respiratory failure  No baseline home O2 per patient. Presenting with 5 L NC O2. Unclear etiology, though previous documentation suggestive of pleural effusion vs component of PNA. Unclear etiology as patient seems to be volume down on exam. No reported history of heart failure or COPD (though extensive tobacco use history). Etiology may be 2/2 to abdominal distention causing  diaphragmatic pressure vs infectious etiology vs pleural effusion d/t third spacing?    - Repeat CXR  - Continuous pulse ox  - Monitor fever curve; given lack of infectious presentation, will defer abx course now  - Lactate  - Aspiration precaution    Cardiac/Vascular  Paroxysmal tachycardia  Presenting with tachycardia to 120s. Noted in OSH previous progress note and was tempered with BB. Unclear etiology, potentially volume down driving sinus tachycardia as patient has been on NGT suction d/t pSBO with limited PO intake. Also appears to be extremely malnourished.     - Repeat EKG  - Cardiac telemetry  - Electrolyte trend, K > 4, Mg > 2  - Will hold trial of IVF bolus until further evaluation regarding volume status returns  - Consider TTE for further evaluation      GI  Generalized abdominal pain  H/o pSBO  H/o laparoscopic cholecystectomy    Patient with recent h/o pSBO at OSH,with good response to NGT decompression. Advanced to CLD. Initially complaining of RUQ abdominal pain at OSH on initial presentation. Now with mild, diffuse abdominal pain on palpation, but none at rest. Abdomen remains distended. Hypoactive bowel sounds.    - Continue CLD; ADAT - if evidence of SBO, NPO, general surgery consult  - Repeat KUB  - US abdomen, monitor for ascites  - Serial abdominal exams    Orthopedic  * Liver injury  Acute hepatic metabolic encephalopathy  Elevated transaminases  Elevated total bilirubin    37 y/o F with PMHx IVDU, polysubstance abuse presenting to [facility] as transfer from [facility] for hepatology evaluation. Initially presented with concerns for acute liver failure (encephalopathy, elevated elevated elevation of LFTs, jaundice) vs pSBO vs sepsis 2/2 abdominal infection. NGT decompression of pSBO with good response, advanced to CLD. Started on broad spectrum abx and tapered. Started on lactulose and rifaximin with good response of encephalopathy, now A&O x 4 without signs of asterixis, tongue  fasciculations, hallucinations. Transferred to [facility] for further hepatological evaluation.    - Hepatology consult  - Acute hepatitis panel  - HIV screen  - US liver doppler / abdomen  - Restart Lactulose 20 BID (titrate to 3-4 BMs per day)  - Restart Rifaximin  - Ceruloplasmin, Copper  - ELIN, Anti-Sm  - PT/INR  - Trend CMP  - Trend CBC  - Monitor neurological status    Other  Failure to thrive in adult  Temporal wasting  Malnutrition    - Boost when tolerated  - Nutrition consult in AM        Critical Care Daily Checklist:    A: Awake: RASS Goal/Actual Goal:    Actual:     B: Spontaneous Breathing Trial Performed?     C: SAT & SBT Coordinated?  N/A                      D: Delirium: CAM-ICU     E: Early Mobility Performed? No   F: Feeding Goal:    Status:     Current Diet Order   Procedures    Diet clear liquid      AS: Analgesia/Sedation N/A   T: Thromboembolic Prophylaxis N/A, high risk of bleed giving hepatic dysfunction   H: HOB > 300 Yes   U: Stress Ulcer Prophylaxis (if needed) Pepcid   G: Glucose Control N/A POC checks   B: Bowel Function     I: Indwelling Catheter (Lines & Perez) Necessity Perez, PIV   D: De-escalation of Antimicrobials/Pharmacotherapies N/A    Plan for the day/ETD Stepdown to Hospital medicine 03/29    Code Status:  Family/Goals of Care: Full Code         Critical secondary to Patient has an abrupt change in neurologic status: Acute hepatic metabolic encephalopathy     Critical care was time spent personally by me on the following activities: 40 minutes development of treatment plan with patient or surrogate and bedside caregivers, discussions with consultants, evaluation of patient's response to treatment, examination of patient, ordering and performing treatments and interventions, ordering and review of laboratory studies, ordering and review of radiographic studies, pulse oximetry, re-evaluation of patient's condition. This critical care time did not overlap with that of any other  provider or involve time for any procedures.     Mike-Joseph Gloria MD  Critical Care Medicine  [facility]

## 2022-03-29 NOTE — PROGRESS NOTES
T2 Resistance Panel 510 (k) Study  IRB: 2021.289  PI: Dr. Chad Landers    Date: 03/29/2022  Subject Number:     After the informed consent process was completed, paired (in-house) blood cultures were drawn by the RN. The time of the blood draw was 11:10. The site of the blood draw was left hand. The T2 research samples were drawn immediately following the paired blood cultures using the same site/needlestick.     Tube A was drawn at 11:13.   Tube B was drawn at 11:14.   Tube C was drawn at 11:14.    Accession number of paired blood culture is [id number].    The subject did not have any Adverse Events.        The subject's research blood sample was brought to the research lab for testing. Results will not be shared with the subject.      Xiomara Chavira MD  [facility] Infectious Diseases  T2 Resistance Panel Clinical Trial

## 2022-03-29 NOTE — SUBJECTIVE & OBJECTIVE
Interval History/Significant Events: AF HDS. Patient still tachycardic to 110s-120s. Patient continues to be A&O x 4 without asterixis, tongue fasciculations. Slightly more fatigued, but still appropriately responsive.     Review of Systems   Constitutional:  Positive for activity change, appetite change and fatigue. Negative for chills, diaphoresis and fever.   HENT:  Positive for dental problem (all teeth removed previously d/t poor dentition). Negative for sneezing and sore throat.    Eyes:  Negative for pain and visual disturbance.   Respiratory:  Positive for shortness of breath. Negative for cough, chest tightness and wheezing.    Cardiovascular:  Negative for chest pain, palpitations and leg swelling.   Gastrointestinal:  Positive for abdominal distention. Negative for abdominal pain, blood in stool, constipation, diarrhea, nausea and vomiting.   Genitourinary:  Negative for dysuria, hematuria and urgency.   Musculoskeletal:  Negative for arthralgias and myalgias.   Skin:  Positive for color change (jaundice). Negative for rash.   Neurological:  Positive for weakness. Negative for tremors, syncope, light-headedness and numbness.   Psychiatric/Behavioral:  Negative for agitation and confusion.    Objective:     Vital Signs (Most Recent):  Temp: 97.9 °F (36.6 °C) (03/29/22 0705)  Pulse: (!) 116 (03/29/22 0812)  Resp: (!) 29 (03/29/22 0812)  BP: (!) 131/97 (03/29/22 0705)  SpO2: 96 % (03/29/22 0812)   Vital Signs (24h Range):  Temp:  [97.9 °F (36.6 °C)-98.2 °F (36.8 °C)] 97.9 °F (36.6 °C)  Pulse:  [110-120] 116  Resp:  [26-37] 29  SpO2:  [91 %-99 %] 96 %  BP: (110-132)/(77-97) 131/97   Weight: 33.5 kg (73 lb 13.7 oz)  Body mass index is 14.42 kg/m².      Intake/Output Summary (Last 24 hours) at 3/29/2022 1259  Last data filed at 3/29/2022 0900  Gross per 24 hour   Intake --   Output 2475 ml   Net -2475 ml       Physical Exam  Constitutional:       General: She is not in acute distress.     Appearance: She is  ill-appearing. She is not toxic-appearing or diaphoretic.      Comments: Cachetic, temporal wasting, jaundiced, weak, but in no apparent distress   HENT:      Head: Normocephalic.      Nose: Nose normal.   Eyes:      General: Scleral icterus present.      Extraocular Movements: Extraocular movements intact.      Pupils: Pupils are equal, round, and reactive to light.   Cardiovascular:      Rate and Rhythm: Regular rhythm. Tachycardia present.      Pulses: Normal pulses.      Heart sounds: Normal heart sounds. No murmur heard.  Pulmonary:      Effort: Pulmonary effort is normal. No respiratory distress.      Breath sounds: Normal breath sounds. No stridor. No wheezing or rales.   Abdominal:      General: Abdomen is flat. There is distension.      Palpations: Abdomen is soft.      Tenderness: There is abdominal tenderness (diffuse tenderness, intermittently). There is no guarding or rebound.   Musculoskeletal:         General: No swelling or tenderness. Normal range of motion.      Cervical back: Normal range of motion. No rigidity or tenderness.      Right lower leg: No edema.      Left lower leg: No edema.   Skin:     General: Skin is warm and dry.      Coloration: Skin is jaundiced.      Findings: Bruising and rash (petecchial rash on back) present.   Neurological:      General: No focal deficit present.      Mental Status: She is alert and oriented to person, place, and time. Mental status is at baseline.      Cranial Nerves: No cranial nerve deficit.      Sensory: No sensory deficit.      Motor: No weakness.      Comments: Slightly sleepy, very tired, though she attributes this to long ride in uncomfortable transport   Psychiatric:         Mood and Affect: Mood normal.         Behavior: Behavior normal.         Thought Content: Thought content normal.       Vents:     Lines/Drains/Airways       Peripherally Inserted Central Catheter Line  Duration             PICC Triple Lumen left basilic -- days               Drain  Duration                  Urethral Catheter 03/28/22 1848 Latex <1 day                  Significant Labs:    CBC/Anemia Profile:  Recent Labs   Lab 03/28/22 2127 03/29/22  0820   WBC 9.25 10.08   HGB 8.2* 8.7*   HCT 25.3* 27.9*   * 158   * 110*   RDW 27.6* 27.7*        Chemistries:  Recent Labs   Lab 03/28/22 2127 03/29/22  0310    139   K 3.7 3.6    100   CO2 27 29   BUN 11 11   CREATININE 0.4* 0.4*   CALCIUM 8.8 9.4   ALBUMIN 3.1* 3.2*   PROT 5.3* 5.7*   BILITOT 9.3* 9.3*   ALKPHOS 882* 949*   ALT 71* 79*   * 106*   MG 1.7 1.7   PHOS 3.7 4.0       All pertinent labs within the past 24 hours have been reviewed.    Significant Imaging:  I have reviewed all pertinent imaging results/findings within the past 24 hours.

## 2022-03-29 NOTE — ASSESSMENT & PLAN NOTE
ASSESSMENT:     DIAGNOSES & PROBLEMS  History of opioid use disorder, severe, previously on maintenance therapy      STRENGTHS AND LIABILITIES   Strength: Patient accepts guidance/feedback, Liability: Patient has poor health.      MOTIVATION TO PURSUE RECOVERY: N/a    ACCEPTANCE OF ADDICTION: good    ABILITY TO ADHERE TO TREATMENT PLAN: Good      PLAN:       MANAGEMENT PLAN, TREATMENT GOALS, THERAPEUTIC TECHNIQUES/APPROACHES & CLINICAL REASONING  Patient does not wish to restart suboxone at this time.      · Patient counseled on abstinence from alcohol and substances of abuse (illicit and prescription).  · Additional workup planned to address substance use disorder, in order to guide and refine ongoing management options, includes serial alcohol and drug laboratory testing (e.g. PETH, urine toxicology).  · Relapse prevention and motivational interviewing provided.  · Education provided on 12 step recovery programs.      PRESCRIPTION DRUG MANAGEMENT  - The risks and benefits of medication were discussed with this patient.  - Possible expectable adverse effects of any current or proposed individual psychotropic agents were discussed with this patient.  - Counseling was provided on the importance of full compliance with medication regimens.      In cases of emergency, daily coverage provided by Acute/ER Psych MD, NP, or SW, with contact numbers located in [facility] On Call Schedule    Addiction Psychiatry will sign off, please call with additional questions.    Case discussed with staff addiction psychiatrist: JEFFREY DHILLON MD

## 2022-03-30 ENCOUNTER — ANESTHESIA EVENT (OUTPATIENT)
Dept: INTENSIVE CARE | Facility: HOSPITAL | Age: 37
DRG: 441 | End: 2022-03-30
Payer: MEDICAID

## 2022-03-30 ENCOUNTER — ANESTHESIA (OUTPATIENT)
Dept: INTENSIVE CARE | Facility: HOSPITAL | Age: 37
DRG: 441 | End: 2022-03-30
Payer: MEDICAID

## 2022-03-30 PROBLEM — J98.11 ATELECTASIS: Status: ACTIVE | Noted: 2022-03-30

## 2022-03-30 PROBLEM — T17.500A MUCUS PLUGGING OF BRONCHI: Status: ACTIVE | Noted: 2022-03-30

## 2022-03-30 LAB
ALBUMIN SERPL BCP-MCNC: 2.6 G/DL (ref 3.5–5.2)
ALBUMIN SERPL BCP-MCNC: 2.7 G/DL (ref 3.5–5.2)
ALP SERPL-CCNC: 869 U/L (ref 55–135)
ALP SERPL-CCNC: 923 U/L (ref 55–135)
ALT SERPL W/O P-5'-P-CCNC: 78 U/L (ref 10–44)
ALT SERPL W/O P-5'-P-CCNC: 82 U/L (ref 10–44)
ANION GAP SERPL CALC-SCNC: 10 MMOL/L (ref 8–16)
ANION GAP SERPL CALC-SCNC: 9 MMOL/L (ref 8–16)
ANISOCYTOSIS BLD QL SMEAR: SLIGHT
AST SERPL-CCNC: 79 U/L (ref 10–40)
AST SERPL-CCNC: 89 U/L (ref 10–40)
BASOPHILS # BLD AUTO: 0.04 K/UL (ref 0–0.2)
BASOPHILS NFR BLD: 0.3 % (ref 0–1.9)
BILIRUB SERPL-MCNC: 6.3 MG/DL (ref 0.1–1)
BILIRUB SERPL-MCNC: 7.9 MG/DL (ref 0.1–1)
BUN SERPL-MCNC: 11 MG/DL (ref 6–20)
BUN SERPL-MCNC: 14 MG/DL (ref 6–20)
CALCIUM SERPL-MCNC: 8.6 MG/DL (ref 8.7–10.5)
CALCIUM SERPL-MCNC: 9.2 MG/DL (ref 8.7–10.5)
CHLORIDE SERPL-SCNC: 98 MMOL/L (ref 95–110)
CHLORIDE SERPL-SCNC: 99 MMOL/L (ref 95–110)
CO2 SERPL-SCNC: 29 MMOL/L (ref 23–29)
CO2 SERPL-SCNC: 30 MMOL/L (ref 23–29)
CREAT SERPL-MCNC: 0.4 MG/DL (ref 0.5–1.4)
CREAT SERPL-MCNC: 0.4 MG/DL (ref 0.5–1.4)
DIFFERENTIAL METHOD: ABNORMAL
EOSINOPHIL # BLD AUTO: 0 K/UL (ref 0–0.5)
EOSINOPHIL NFR BLD: 0.1 % (ref 0–8)
ERYTHROCYTE [DISTWIDTH] IN BLOOD BY AUTOMATED COUNT: 27.1 % (ref 11.5–14.5)
EST. GFR  (AFRICAN AMERICAN): >60 ML/MIN/1.73 M^2
EST. GFR  (AFRICAN AMERICAN): >60 ML/MIN/1.73 M^2
EST. GFR  (NON AFRICAN AMERICAN): >60 ML/MIN/1.73 M^2
EST. GFR  (NON AFRICAN AMERICAN): >60 ML/MIN/1.73 M^2
GLUCOSE SERPL-MCNC: 131 MG/DL (ref 70–110)
GLUCOSE SERPL-MCNC: 174 MG/DL (ref 70–110)
HCT VFR BLD AUTO: 25.4 % (ref 37–48.5)
HGB BLD-MCNC: 7.8 G/DL (ref 12–16)
HYPOCHROMIA BLD QL SMEAR: ABNORMAL
IMM GRANULOCYTES # BLD AUTO: 0.32 K/UL (ref 0–0.04)
IMM GRANULOCYTES NFR BLD AUTO: 2.1 % (ref 0–0.5)
INR PPP: 1.2 (ref 0.8–1.2)
LYMPHOCYTES # BLD AUTO: 1.3 K/UL (ref 1–4.8)
LYMPHOCYTES NFR BLD: 8.7 % (ref 18–48)
MAGNESIUM SERPL-MCNC: 1.7 MG/DL (ref 1.6–2.6)
MCH RBC QN AUTO: 33.8 PG (ref 27–31)
MCHC RBC AUTO-ENTMCNC: 30.7 G/DL (ref 32–36)
MCV RBC AUTO: 110 FL (ref 82–98)
MITOCHONDRIA AB TITR SER IF: NORMAL {TITER}
MONOCYTES # BLD AUTO: 0.6 K/UL (ref 0.3–1)
MONOCYTES NFR BLD: 3.9 % (ref 4–15)
NEUTROPHILS # BLD AUTO: 13 K/UL (ref 1.8–7.7)
NEUTROPHILS NFR BLD: 84.9 % (ref 38–73)
NRBC BLD-RTO: 0 /100 WBC
OVALOCYTES BLD QL SMEAR: ABNORMAL
PHOSPHATE SERPL-MCNC: 4.3 MG/DL (ref 2.7–4.5)
PLATELET # BLD AUTO: 202 K/UL (ref 150–450)
PMV BLD AUTO: 12 FL (ref 9.2–12.9)
POCT GLUCOSE: 137 MG/DL (ref 70–110)
POIKILOCYTOSIS BLD QL SMEAR: SLIGHT
POLYCHROMASIA BLD QL SMEAR: ABNORMAL
POTASSIUM SERPL-SCNC: 3.2 MMOL/L (ref 3.5–5.1)
POTASSIUM SERPL-SCNC: 3.7 MMOL/L (ref 3.5–5.1)
PROT SERPL-MCNC: 5.4 G/DL (ref 6–8.4)
PROT SERPL-MCNC: 5.5 G/DL (ref 6–8.4)
PROTHROMBIN TIME: 12.6 SEC (ref 9–12.5)
RBC # BLD AUTO: 2.31 M/UL (ref 4–5.4)
SMOOTH MUSCLE AB TITR SER IF: NORMAL {TITER}
SODIUM SERPL-SCNC: 137 MMOL/L (ref 136–145)
SODIUM SERPL-SCNC: 138 MMOL/L (ref 136–145)
SPHEROCYTES BLD QL SMEAR: ABNORMAL
WBC # BLD AUTO: 15.32 K/UL (ref 3.9–12.7)

## 2022-03-30 PROCEDURE — 63600175 PHARM REV CODE 636 W HCPCS: Performed by: STUDENT IN AN ORGANIZED HEALTH CARE EDUCATION/TRAINING PROGRAM

## 2022-03-30 PROCEDURE — 99291 CRITICAL CARE FIRST HOUR: CPT | Mod: ,,, | Performed by: STUDENT IN AN ORGANIZED HEALTH CARE EDUCATION/TRAINING PROGRAM

## 2022-03-30 PROCEDURE — 27000221 HC OXYGEN, UP TO 24 HOURS

## 2022-03-30 PROCEDURE — 85025 COMPLETE CBC W/AUTO DIFF WBC: CPT

## 2022-03-30 PROCEDURE — 25000003 PHARM REV CODE 250

## 2022-03-30 PROCEDURE — 63600175 PHARM REV CODE 636 W HCPCS

## 2022-03-30 PROCEDURE — 25000003 PHARM REV CODE 250: Performed by: STUDENT IN AN ORGANIZED HEALTH CARE EDUCATION/TRAINING PROGRAM

## 2022-03-30 PROCEDURE — 82803 BLOOD GASES ANY COMBINATION: CPT

## 2022-03-30 PROCEDURE — 94761 N-INVAS EAR/PLS OXIMETRY MLT: CPT

## 2022-03-30 PROCEDURE — S4991 NICOTINE PATCH NONLEGEND: HCPCS

## 2022-03-30 PROCEDURE — 84100 ASSAY OF PHOSPHORUS: CPT

## 2022-03-30 PROCEDURE — 83735 ASSAY OF MAGNESIUM: CPT

## 2022-03-30 PROCEDURE — 94003 VENT MGMT INPAT SUBQ DAY: CPT

## 2022-03-30 PROCEDURE — 94002 VENT MGMT INPAT INIT DAY: CPT

## 2022-03-30 PROCEDURE — 94640 AIRWAY INHALATION TREATMENT: CPT

## 2022-03-30 PROCEDURE — 20000000 HC ICU ROOM

## 2022-03-30 PROCEDURE — 99900026 HC AIRWAY MAINTENANCE (STAT)

## 2022-03-30 PROCEDURE — 80053 COMPREHEN METABOLIC PANEL: CPT | Mod: 91

## 2022-03-30 PROCEDURE — 37799 UNLISTED PX VASCULAR SURGERY: CPT

## 2022-03-30 PROCEDURE — 99900035 HC TECH TIME PER 15 MIN (STAT)

## 2022-03-30 PROCEDURE — 99291 PR CRITICAL CARE, E/M 30-74 MINUTES: ICD-10-PCS | Mod: ,,, | Performed by: STUDENT IN AN ORGANIZED HEALTH CARE EDUCATION/TRAINING PROGRAM

## 2022-03-30 PROCEDURE — 25000242 PHARM REV CODE 250 ALT 637 W/ HCPCS

## 2022-03-30 PROCEDURE — 85610 PROTHROMBIN TIME: CPT | Performed by: STUDENT IN AN ORGANIZED HEALTH CARE EDUCATION/TRAINING PROGRAM

## 2022-03-30 RX ORDER — SUCCINYLCHOLINE CHLORIDE 20 MG/ML
INJECTION INTRAMUSCULAR; INTRAVENOUS
Status: DISPENSED
Start: 2022-03-30 | End: 2022-03-30

## 2022-03-30 RX ORDER — GLUCAGON 1 MG
1 KIT INJECTION
Status: DISCONTINUED | OUTPATIENT
Start: 2022-03-30 | End: 2022-04-01

## 2022-03-30 RX ORDER — NOREPINEPHRINE BITARTRATE/D5W 4MG/250ML
0-3 PLASTIC BAG, INJECTION (ML) INTRAVENOUS CONTINUOUS
Status: DISCONTINUED | OUTPATIENT
Start: 2022-03-30 | End: 2022-03-31

## 2022-03-30 RX ORDER — POTASSIUM CHLORIDE 7.45 MG/ML
10 INJECTION INTRAVENOUS
Status: DISCONTINUED | OUTPATIENT
Start: 2022-03-30 | End: 2022-03-30

## 2022-03-30 RX ORDER — NOREPINEPHRINE BITARTRATE/D5W 4MG/250ML
PLASTIC BAG, INJECTION (ML) INTRAVENOUS
Status: COMPLETED
Start: 2022-03-30 | End: 2022-03-30

## 2022-03-30 RX ORDER — FENTANYL CITRATE 50 UG/ML
25 INJECTION, SOLUTION INTRAMUSCULAR; INTRAVENOUS
Status: COMPLETED | OUTPATIENT
Start: 2022-03-30 | End: 2022-03-31

## 2022-03-30 RX ORDER — ALBUTEROL SULFATE 2.5 MG/.5ML
2.5 SOLUTION RESPIRATORY (INHALATION) EVERY 6 HOURS
Status: DISCONTINUED | OUTPATIENT
Start: 2022-03-30 | End: 2022-04-08

## 2022-03-30 RX ORDER — MAGNESIUM SULFATE HEPTAHYDRATE 40 MG/ML
2 INJECTION, SOLUTION INTRAVENOUS ONCE
Status: COMPLETED | OUTPATIENT
Start: 2022-03-30 | End: 2022-03-30

## 2022-03-30 RX ORDER — PROPOFOL 10 MG/ML
VIAL (ML) INTRAVENOUS
Status: DISPENSED
Start: 2022-03-30 | End: 2022-03-30

## 2022-03-30 RX ORDER — GUAIFENESIN 100 MG/5ML
200 SOLUTION ORAL EVERY 8 HOURS
Status: DISCONTINUED | OUTPATIENT
Start: 2022-03-30 | End: 2022-04-07

## 2022-03-30 RX ORDER — MIDAZOLAM HYDROCHLORIDE 1 MG/ML
INJECTION INTRAMUSCULAR; INTRAVENOUS
Status: COMPLETED
Start: 2022-03-30 | End: 2022-03-30

## 2022-03-30 RX ORDER — POTASSIUM CHLORIDE 14.9 MG/ML
20 INJECTION INTRAVENOUS ONCE
Status: COMPLETED | OUTPATIENT
Start: 2022-03-30 | End: 2022-03-30

## 2022-03-30 RX ORDER — ROCURONIUM BROMIDE 10 MG/ML
INJECTION, SOLUTION INTRAVENOUS
Status: COMPLETED
Start: 2022-03-30 | End: 2022-03-30

## 2022-03-30 RX ORDER — FENTANYL CITRATE 50 UG/ML
INJECTION, SOLUTION INTRAMUSCULAR; INTRAVENOUS
Status: COMPLETED
Start: 2022-03-30 | End: 2022-03-30

## 2022-03-30 RX ORDER — ETOMIDATE 2 MG/ML
INJECTION INTRAVENOUS
Status: DISPENSED
Start: 2022-03-30 | End: 2022-03-30

## 2022-03-30 RX ORDER — PHENYLEPHRINE HYDROCHLORIDE 10 MG/ML
200 INJECTION INTRAVENOUS ONCE
Status: COMPLETED | OUTPATIENT
Start: 2022-03-30 | End: 2022-03-30

## 2022-03-30 RX ORDER — FENTANYL CITRATE 50 UG/ML
25 INJECTION, SOLUTION INTRAMUSCULAR; INTRAVENOUS
Status: DISCONTINUED | OUTPATIENT
Start: 2022-04-01 | End: 2022-04-02

## 2022-03-30 RX ORDER — SODIUM CHLORIDE FOR INHALATION 3 %
4 VIAL, NEBULIZER (ML) INHALATION EVERY 6 HOURS
Status: DISCONTINUED | OUTPATIENT
Start: 2022-03-30 | End: 2022-04-08

## 2022-03-30 RX ORDER — INSULIN ASPART 100 [IU]/ML
0-5 INJECTION, SOLUTION INTRAVENOUS; SUBCUTANEOUS EVERY 4 HOURS PRN
Status: DISCONTINUED | OUTPATIENT
Start: 2022-03-30 | End: 2022-04-01

## 2022-03-30 RX ORDER — PHENYLEPHRINE HYDROCHLORIDE 10 MG/ML
200 INJECTION INTRAVENOUS ONCE
Status: DISCONTINUED | OUTPATIENT
Start: 2022-03-30 | End: 2022-04-02

## 2022-03-30 RX ORDER — DEXTROSE MONOHYDRATE 100 MG/ML
INJECTION, SOLUTION INTRAVENOUS CONTINUOUS PRN
Status: DISCONTINUED | OUTPATIENT
Start: 2022-03-30 | End: 2022-04-01

## 2022-03-30 RX ADMIN — FENTANYL CITRATE 25 MCG: 50 INJECTION INTRAMUSCULAR; INTRAVENOUS at 08:03

## 2022-03-30 RX ADMIN — GUAIFENESIN 200 MG: 100 SOLUTION ORAL at 04:03

## 2022-03-30 RX ADMIN — CEFEPIME HYDROCHLORIDE 1 G: 1 INJECTION, SOLUTION INTRAVENOUS at 09:03

## 2022-03-30 RX ADMIN — ALBUTEROL SULFATE 2.5 MG: 2.5 SOLUTION RESPIRATORY (INHALATION) at 07:03

## 2022-03-30 RX ADMIN — RIFAXIMIN 550 MG: 550 TABLET ORAL at 08:03

## 2022-03-30 RX ADMIN — ALUMINUM HYDROXIDE, MAGNESIUM HYDROXIDE, AND SIMETHICONE 30 ML: 200; 200; 20 SUSPENSION ORAL at 11:03

## 2022-03-30 RX ADMIN — SODIUM CHLORIDE SOLN NEBU 3% 4 ML: 3 NEBU SOLN at 07:03

## 2022-03-30 RX ADMIN — SODIUM CHLORIDE SOLN NEBU 3% 4 ML: 3 NEBU SOLN at 01:03

## 2022-03-30 RX ADMIN — LORAZEPAM 0.5 MG: 0.5 TABLET ORAL at 10:03

## 2022-03-30 RX ADMIN — LACTULOSE 20 G: 20 SOLUTION ORAL at 08:03

## 2022-03-30 RX ADMIN — NOREPINEPHRINE BITARTRATE 0.4 MCG/KG/MIN: 4 INJECTION, SOLUTION INTRAVENOUS at 01:03

## 2022-03-30 RX ADMIN — FUROSEMIDE 40 MG: 10 INJECTION, SOLUTION INTRAMUSCULAR; INTRAVENOUS at 07:03

## 2022-03-30 RX ADMIN — HYDROXYZINE HYDROCHLORIDE 25 MG: 25 TABLET ORAL at 12:03

## 2022-03-30 RX ADMIN — NICOTINE 1 PATCH: 14 PATCH, EXTENDED RELEASE TRANSDERMAL at 08:03

## 2022-03-30 RX ADMIN — FAMOTIDINE 20 MG: 20 TABLET ORAL at 08:03

## 2022-03-30 RX ADMIN — MUPIROCIN: 20 OINTMENT TOPICAL at 08:03

## 2022-03-30 RX ADMIN — MIDAZOLAM 2 MG: 1 INJECTION INTRAMUSCULAR; INTRAVENOUS at 01:03

## 2022-03-30 RX ADMIN — Medication 6 MG: at 08:03

## 2022-03-30 RX ADMIN — CEFEPIME HYDROCHLORIDE 1 G: 1 INJECTION, SOLUTION INTRAVENOUS at 05:03

## 2022-03-30 RX ADMIN — FUROSEMIDE 40 MG: 10 INJECTION, SOLUTION INTRAMUSCULAR; INTRAVENOUS at 08:03

## 2022-03-30 RX ADMIN — ALUMINUM HYDROXIDE, MAGNESIUM HYDROXIDE, AND SIMETHICONE 30 ML: 200; 200; 20 SUSPENSION ORAL at 08:03

## 2022-03-30 RX ADMIN — ALUMINUM HYDROXIDE, MAGNESIUM HYDROXIDE, AND SIMETHICONE 30 ML: 200; 200; 20 SUSPENSION ORAL at 04:03

## 2022-03-30 RX ADMIN — NOREPINEPHRINE BITARTRATE 0.08 MCG/KG/MIN: 4 INJECTION, SOLUTION INTRAVENOUS at 01:03

## 2022-03-30 RX ADMIN — VASOPRESSIN 0.04 UNITS/MIN: 20 INJECTION INTRAVENOUS at 02:03

## 2022-03-30 RX ADMIN — MAGNESIUM SULFATE 2 G: 2 INJECTION INTRAVENOUS at 08:03

## 2022-03-30 RX ADMIN — POTASSIUM CHLORIDE 10 MEQ: 7.46 INJECTION, SOLUTION INTRAVENOUS at 08:03

## 2022-03-30 RX ADMIN — PHENYLEPHRINE HYDROCHLORIDE 200 MCG: 10 INJECTION INTRAVENOUS at 02:03

## 2022-03-30 RX ADMIN — ROCURONIUM BROMIDE 50 MG: 10 INJECTION, SOLUTION INTRAVENOUS at 01:03

## 2022-03-30 RX ADMIN — AZITHROMYCIN 330 MG: 900 POWDER, FOR SUSPENSION ORAL at 09:03

## 2022-03-30 RX ADMIN — POTASSIUM CHLORIDE 10 MEQ: 7.46 INJECTION, SOLUTION INTRAVENOUS at 10:03

## 2022-03-30 RX ADMIN — GUAIFENESIN 200 MG: 100 SOLUTION ORAL at 09:03

## 2022-03-30 RX ADMIN — FENTANYL CITRATE 100 MCG: 50 INJECTION INTRAMUSCULAR; INTRAVENOUS at 01:03

## 2022-03-30 RX ADMIN — ENOXAPARIN SODIUM 30 MG: 30 INJECTION, SOLUTION INTRAVENOUS; SUBCUTANEOUS at 04:03

## 2022-03-30 RX ADMIN — FENTANYL CITRATE 25 MCG: 50 INJECTION INTRAMUSCULAR; INTRAVENOUS at 04:03

## 2022-03-30 RX ADMIN — POTASSIUM CHLORIDE 20 MEQ: 200 INJECTION, SOLUTION INTRAVENOUS at 11:03

## 2022-03-30 RX ADMIN — CEFEPIME HYDROCHLORIDE 1 G: 1 INJECTION, SOLUTION INTRAVENOUS at 02:03

## 2022-03-30 RX ADMIN — POTASSIUM CHLORIDE 10 MEQ: 7.46 INJECTION, SOLUTION INTRAVENOUS at 09:03

## 2022-03-30 RX ADMIN — ALBUTEROL SULFATE 2.5 MG: 2.5 SOLUTION RESPIRATORY (INHALATION) at 01:03

## 2022-03-30 RX ADMIN — GUAIFENESIN 600 MG: 600 TABLET, EXTENDED RELEASE ORAL at 08:03

## 2022-03-30 RX ADMIN — MUPIROCIN: 20 OINTMENT TOPICAL at 10:03

## 2022-03-30 NOTE — PROGRESS NOTES
[facility]  General Surgery  Progress Note    Subjective:     History of Present Illness:  Patient is a 37yo female with a medical history significant for IVDU on suboxone, polysubstance use (meth, opioids, tobacco) and gastric ulcers s/p recent cholecystectomy at OSH on 3/8 who presented to [facility] as transfer to MICU for hepatology evaluation given increasing jaundice, lethargy, AM and increased oxygen requirement. She was initially admitted to an OSH given these complaints and was found to have an elevated t bili and ammonia but reportedly normal LFTs. She was stared on lactulose and rifaximin. Additionally, there was a concern for partial SBO so an NGT was placed. Per notes, she progressed well from an obstructive standpoint and was passing flatus and having bowel movements per the patient. Notes also support that she was tolerating a clear diet. She is hungry and denies any nausea. No fevers. Tachycardic in the 110s, normal WBC, t bili 9.3. LFTs mildly elevated. CT scan demonstrated dilated loops of bowel and intra-abdomina fluid. General surgery consulted for evaluation.    Specifically denies chest pain, increased work of breathing, increasing/worsening abdominal pain, fever/chills, nausea, vomiting, constipation, diarrhea, dysuria, suprapubic pain, recent IVDU (currently on suboxone), hallucinations (auditory and visual), tremors, seizures, loss of consciousness, headache, sensory/strength changes, abnormal bleeding.  Substantial IVDU history (meth), non-injectable opioid abuse. Recently stopped smoking history (smoked since 12 y/o). On suboxone per OSH report and per patient.   Surgical history notable for recent cholecystectomy and C-sections in the past (last was September per her report).      Post-Op Info:  * No surgery found *         Interval History:  Intubated overnight. Had multiple bowel movements yesterday. NGT in place with minimal output.   Objective:     Vital Signs (Most  Recent):  Temp: 99.1 °F (37.3 °C) (03/30/22 0715)  Pulse: (!) 124 (03/30/22 0745)  Resp: (!) 34 (03/30/22 0808)  BP: 115/76 (03/30/22 0745)  SpO2: (!) 91 % (03/30/22 0745)   Vital Signs (24h Range):  Temp:  [97.6 °F (36.4 °C)-99.1 °F (37.3 °C)] 99.1 °F (37.3 °C)  Pulse:  [108-141] 124  Resp:  [16-41] 34  SpO2:  [83 %-100 %] 91 %  BP: ()/(49-97) 115/76     Weight: 32 kg (70 lb 8.8 oz)  Body mass index is 13.78 kg/m².    Physical Exam  Constitutional:       General: She is not in acute distress.     Appearance: She is ill-appearing. She is not toxic-appearing or diaphoretic.      Comments: Cachetic, temporal wasting, jaundiced, weak  Intubated and sedated   HENT:      Head: Normocephalic.      Nose: Nose normal.   Eyes:      General: Scleral icterus present.      Extraocular Movements: Extraocular movements intact.      Pupils: Pupils are equal, round, and reactive to light.   Cardiovascular:      Rate and Rhythm: Regular rhythm. Tachycardia present.      Pulses: Normal pulses.      Heart sounds: Normal heart sounds. No murmur heard.  Pulmonary:      Effort: Pulmonary effort is normal. No respiratory distress.      Breath sounds: Normal breath sounds. No stridor. No wheezing or rales.   Abdominal:      General: Abdomen is flat. There is distension.      Palpations: Abdomen is soft.      Tenderness: There is no abdominal tenderness. There is no guarding or rebound.   Musculoskeletal:         General: No swelling or tenderness. Normal range of motion.      Cervical back: Normal range of motion. No rigidity or tenderness.      Right lower leg: No edema.      Left lower leg: No edema.   Skin:     General: Skin is warm and dry.      Coloration: Skin is jaundiced.      Findings: Bruising and rash (petecchial rash on back) present.   Neurological:      General: No focal deficit present.      Mental Status: She is alert and oriented to person, place, and time. Mental status is at baseline.      Cranial Nerves: No  cranial nerve deficit.      Sensory: No sensory deficit.      Motor: No weakness.      Comments: Intubated and sedated   Psychiatric:         Mood and Affect: Mood normal.         Behavior: Behavior normal.         Thought Content: Thought content normal.       Significant Labs:  I have reviewed all pertinent lab results within the past 24 hours.  CBC:   Recent Labs   Lab 03/30/22 0425   WBC 15.32*   RBC 2.31*   HGB 7.8*   HCT 25.4*      *   MCH 33.8*   MCHC 30.7*       CMP:   Recent Labs   Lab 03/30/22 0425   *   CALCIUM 8.6*   ALBUMIN 2.7*   PROT 5.5*      K 3.2*   CO2 30*   CL 99   BUN 14   CREATININE 0.4*   ALKPHOS 869*   ALT 78*   AST 79*   BILITOT 6.3*       Coagulation:   Recent Labs   Lab 03/30/22 0425   LABPROT 12.6*   INR 1.2       Cardiac markers: No results for input(s): CKMB, CPKMB, TROPONINT, TROPONINI, MYOGLOBIN in the last 168 hours.    Significant Diagnostics:  I have reviewed all pertinent imaging results/findings within the past 24 hours.      Assessment/Plan:     * Liver injury  35yo female with a medical history significant for IVDU on suboxone, polysubstance use (meth, opioids, tobacco) and gastric ulcers s/p recent cholecystectomy at OSH on 3/8 who presented to [facility] as transfer to MICU for hepatology evaluation given increasing jaundice, lethargy, AM and increased oxygen requirement. General surgery consulted given concern for possible small bowel obstruction.     - Having bowel movements  - Keep NGT to LIWS while intubated  - Please call with questions or concerns        Sarah Osorio MD  General Surgery  [facility]

## 2022-03-30 NOTE — PLAN OF CARE
Patient is newly intubated and mechanically intubated, during shift patient's oxygen saturation decreased, provider notified and presented to bedside for further assessment with RT. Provider placed patient on high flow nasal cannula, oxygen saturation did not improve, non rebreather placed, pt improved but later displayed decrease LOC, low blood pressure and decreased oxygen saturation, provider presented to bedside with critical care team and decided to emergently intubate pt, pt tolerated intubation well, VS and assessment per flow sheet, patient progressing towards goals as tolerated, plan of care reviewed with patient, no sxs of distress, all concerns addressed, will continue plan of care per provider orders.    Problem: Adult Inpatient Plan of Care  Goal: Plan of Care Review  Outcome: Ongoing, Progressing  Goal: Patient-Specific Goal (Individualized)  3/30/2022 0614 by Agus Pierre RN  Flowsheets (Taken 3/30/2022 0600)  Anxieties, Fears or Concerns: concern about oxygen saturation  Individualized Care Needs: pain management  Patient-Specific Goals (Include Timeframe): PRN hydroxyzine administered PO for anxiety,  PRN IV Fentanyl adminsitered for pain, prepared pt for mechanical ventilation  3/30/2022 0600 by Agus Pierre RN  Outcome: Ongoing, Progressing     Problem: Inability to Wean (Mechanical Ventilation, Invasive)  Goal: Mechanical Ventilation Liberation  Outcome: Ongoing, Progressing     Problem: Inability to Wean (Mechanical Ventilation, Invasive)  Goal: Mechanical Ventilation Liberation  Outcome: Ongoing, Progressing     Problem: Ventilator-Induced Lung Injury (Mechanical Ventilation, Invasive)  Goal: Absence of Ventilator-Induced Lung Injury  Outcome: Ongoing, Progressing     Problem: Restraint, Nonbehavioral (Nonviolent)  Goal: Absence of Harm or Injury  Outcome: Ongoing, Progressing     CMICU DAILY GOALS       A: Awake    RASS: Goal -  0 alert and calm  Actual - RASS (Savage  Agitation-Sedation Scale): -2-->light sedation   Restraint necessity: Yes, Clinical Justification: Treatment Interference  B: Breathe   SBT: Not attempted   C: Coordinate A & B, analgesics/sedatives   Pain: managed    SAT: NA  D: Delirium   CAM-ICU: Overall CAM-ICU: Negative  E: Early(intubated/ Progressive (non-intubated) Mobility   MOVE Screen: Fail   Activity: Activity Management: Patient unable to perform activities  FAS: Feeding/Nutrition   Diet order: Diet/Nutrition Received: full liquid,    T: Thrombus   DVT prophylaxis: VTE Required Core Measure: Provider determined low risk VTE  H: HOB Elevation   Head of Bed (HOB) Positioning: HOB at 30-45 degrees  U: Ulcer Prophylaxis   GI: yes  G: Glucose control   N/A     S: Skin   Bathing/Skin Care: incontinence care, linen changed, dressed/undressed  Device Skin Pressure Protection: absorbent pad utilized/changed, adhesive use limited, skin-to-skin areas padded  Pressure Reduction Devices: pressure-redistributing mattress utilized, specialty bed utilized, foam padding utilized  Pressure Reduction Techniques: weight shift assistance provided  Skin Protection: adhesive use limited, incontinence pads utilized, skin-to-skin areas padded, transparent dressing maintained, tubing/devices free from skin contact  B: Bowel Function   no issues   I: Indwelling Catheters   Perez necessity:      Urethral Catheter 03/28/22 1848 Latex-Reason for Continuing Urinary Catheterization: Critically ill in ICU and requiring hourly monitoring of intake/output   CVC necessity: Yes  D: De-escalation Antibiotics   Yes    Family/Goals of care/Code Status   Code Status: Full Code    24H Vital Sign Range  Temp:  [97.6 °F (36.4 °C)-98.4 °F (36.9 °C)]   Pulse:  [108-141]   Resp:  [16-41]   BP: ()/(49-97)   SpO2:  [83 %-100 %]

## 2022-03-30 NOTE — SUBJECTIVE & OBJECTIVE
Interval History:  Intubated overnight. Had multiple bowel movements yesterday. NGT in place with minimal output.   Objective:     Vital Signs (Most Recent):  Temp: 99.1 °F (37.3 °C) (03/30/22 0715)  Pulse: (!) 124 (03/30/22 0745)  Resp: (!) 34 (03/30/22 0808)  BP: 115/76 (03/30/22 0745)  SpO2: (!) 91 % (03/30/22 0745)   Vital Signs (24h Range):  Temp:  [97.6 °F (36.4 °C)-99.1 °F (37.3 °C)] 99.1 °F (37.3 °C)  Pulse:  [108-141] 124  Resp:  [16-41] 34  SpO2:  [83 %-100 %] 91 %  BP: ()/(49-97) 115/76     Weight: 32 kg (70 lb 8.8 oz)  Body mass index is 13.78 kg/m².    Physical Exam  Constitutional:       General: She is not in acute distress.     Appearance: She is ill-appearing. She is not toxic-appearing or diaphoretic.      Comments: Cachetic, temporal wasting, jaundiced, weak  Intubated and sedated   HENT:      Head: Normocephalic.      Nose: Nose normal.   Eyes:      General: Scleral icterus present.      Extraocular Movements: Extraocular movements intact.      Pupils: Pupils are equal, round, and reactive to light.   Cardiovascular:      Rate and Rhythm: Regular rhythm. Tachycardia present.      Pulses: Normal pulses.      Heart sounds: Normal heart sounds. No murmur heard.  Pulmonary:      Effort: Pulmonary effort is normal. No respiratory distress.      Breath sounds: Normal breath sounds. No stridor. No wheezing or rales.   Abdominal:      General: Abdomen is flat. There is distension.      Palpations: Abdomen is soft.      Tenderness: There is no abdominal tenderness. There is no guarding or rebound.   Musculoskeletal:         General: No swelling or tenderness. Normal range of motion.      Cervical back: Normal range of motion. No rigidity or tenderness.      Right lower leg: No edema.      Left lower leg: No edema.   Skin:     General: Skin is warm and dry.      Coloration: Skin is jaundiced.      Findings: Bruising and rash (petecchial rash on back) present.   Neurological:      General: No focal  deficit present.      Mental Status: She is alert and oriented to person, place, and time. Mental status is at baseline.      Cranial Nerves: No cranial nerve deficit.      Sensory: No sensory deficit.      Motor: No weakness.      Comments: Intubated and sedated   Psychiatric:         Mood and Affect: Mood normal.         Behavior: Behavior normal.         Thought Content: Thought content normal.       Significant Labs:  I have reviewed all pertinent lab results within the past 24 hours.  CBC:   Recent Labs   Lab 03/30/22 0425   WBC 15.32*   RBC 2.31*   HGB 7.8*   HCT 25.4*      *   MCH 33.8*   MCHC 30.7*       CMP:   Recent Labs   Lab 03/30/22 0425   *   CALCIUM 8.6*   ALBUMIN 2.7*   PROT 5.5*      K 3.2*   CO2 30*   CL 99   BUN 14   CREATININE 0.4*   ALKPHOS 869*   ALT 78*   AST 79*   BILITOT 6.3*       Coagulation:   Recent Labs   Lab 03/30/22 0425   LABPROT 12.6*   INR 1.2       Cardiac markers: No results for input(s): CKMB, CPKMB, TROPONINT, TROPONINI, MYOGLOBIN in the last 168 hours.    Significant Diagnostics:  I have reviewed all pertinent imaging results/findings within the past 24 hours.

## 2022-03-30 NOTE — RESPIRATORY THERAPY
Pt. Emergently intubated with size 7.5ETT secured 23cm at lips. Currently on AC ventilatory support.

## 2022-03-30 NOTE — ANESTHESIA PROCEDURE NOTES
Ad Hoc Intubation    Date/Time: 3/30/2022 1:35 AM  Performed by: Matthew Scanlon MD  Authorized by: Matthew Scanlon MD     Indications:  Respiratory distress, respiratory failure and airway protection  Diagnosis:  Liver Failure  Provider Requesting Consult:  Critical Care Medicine  Patient Location:  ICU  Timeout:  3/30/2022 1:28 AM  Procedure Start Time:  3/30/2022 1:30 AM  Procedure End Time:  3/30/2022 1:35 AM  Staff:     Anesthesiologist Present: No    Intubation:     Induction:  Intravenous    Intubated:  Postinduction    Mask Ventilation:  Easy mask    Attempts:  1    Attempted By:  Resident anesthesiologist    Method of Intubation:  Video laryngoscopy    Blade:  Kidd 3    Laryngeal View Grade: Grade I - full view of chords      Difficult Airway Encountered?: No      Complications:  None    Airway Device:  Oral endotracheal tube    Airway Device Size:  7.5    Style/Cuff Inflation:  Cuffed    Inflation Amount (mL):  5    Tube secured:  23    Secured at:  The lips    Placement Verified By:  Colorimetric ETCO2 device and Capnometry    Complicating Factors:  None    Findings Post-Intubation:  BS equal bilateral

## 2022-03-30 NOTE — PLAN OF CARE
[facility]  Initial Discharge Assessment       Primary Care Provider: Primary Doctor No    Admission Diagnosis: Acute liver failure [K72.00]    Admission Date: 3/28/2022  Expected Discharge Date:     Discharge Barriers Identified: Underinsured, Substance Abuse    Payor: MEDICAID / Plan: [place] [employer] / Product Type: Managed Medicaid /     No emergency contact information on file.    Discharge Plan A: Home  Discharge Plan B: Home with family    No Pharmacies Listed       Transferred from:     Past Medical History:   Diagnosis Date    IVDU (intravenous drug user)     Opioid use     Polysubstance abuse     Tobacco abuse disorder          CM met with patient in room for Discharge Planning Assessment.  Patient was able to answer questions.  Per patient, she lives with [employer] (s/o) [phone] in a 1-story home with 3 step(s) to enter.   Per patient, she was independent with ADLS and used rollator for ambulation. Per patient, she is not on dialysis and does not take Coumadin.  Patient will have help from [employer] (s/o) [phone] upon discharge.   Discharge Planning Booklet given to patient/family and discussed.  All questions addressed.  CM will follow for needs.      Initial Assessment (most recent)     Adult Discharge Assessment - 03/29/22 2042        Discharge Assessment    Assessment Type Discharge Planning Assessment     Confirmed/corrected address, phone number and insurance Yes     Confirmed Demographics --   CM corrected in Epic    Source of Information patient     When was your last doctors appointment? --   unknown    Communicated KEN with patient/caregiver Date not available/Unable to determine     Reason For Admission Liver injury     Lives With significant other     Facility Arrived From: [facility]     Do you expect to return to your current living situation? Yes     Do you have help at home or someone to help you manage your care at home? Yes     Who are  your caregiver(s) and their phone number(s)? Solomon Montelongo (s/o) [phone]     Prior to hospitilization cognitive status: Not Oriented to Time     Current cognitive status: Alert/Oriented     Walking or Climbing Stairs Difficulty ambulation difficulty, requires equipment     Mobility Management rollator     Dressing/Bathing Difficulty none     Equipment Currently Used at Home rollator     Readmission within 30 days? No     Patient currently being followed by outpatient case management? No     Do you currently have service(s) that help you manage your care at home? No     Do you take prescription medications? Yes     Do you have prescription coverage? Yes     Coverage Medicaid - [employer]     Do you have any problems affording any of your prescribed medications? No     Is the patient taking medications as prescribed? no     If no, which medications is patient not taking? suboxone     Who is going to help you get home at discharge? Solomon Montelongo (s/o) [phone]     How do you get to doctors appointments? family or friend will provide     Are you on dialysis? No     Do you take coumadin? No     Discharge Plan A Home     Discharge Plan B Home with family     DME Needed Upon Discharge  other (see comments)   TBD    Discharge Barriers Identified Underinsured;Substance Abuse        Relationship/Environment    Name(s) of Who Lives With Patient Solomon Montelongo (s/o) [phone]                        PCP:  Primary Doctor No  None        Pharmacy:  No Pharmacies Listed      Emergency Contacts:  No emergency contact information on file.      Insurance:    Payor: MEDICAID / Plan: [employer] CONNECT / Product Type: Managed Medicaid /     Tosin Whitten RN     [phone]      03/29/2022  8:58 PM

## 2022-03-30 NOTE — PROGRESS NOTES
[facility] - Cardiac Medical ICU  Critical Care Medicine  Progress Note    Patient Name: Carolina Baldwin  MRN: [id number]  Admission Date: 3/28/2022  Hospital Length of Stay: 2 days  Code Status: Full Code  Attending Provider: Pawel Velez MD  Primary Care Provider: Primary Doctor No   Principal Problem: Liver injury    Subjective:     HPI:  37 y/o F with PMHx IVDU on suboxone, polysubstance use (meth, opioids, tobacco), gastric ulcers initially presented to OSH for increasing jaundice, lethargy, AMS, evaluation for pSBO, increased O2 requirement (3-4 L, no baseline home O2). Was started on broad spectrum Abx at OSH and eventually discontinued. Initially presented oriented x 2 and severe jaundice, elevated ammonia, but normal LFTs? Started on lactulose and rifaximin. NGT decompression at OSH for SBO with good response, advanced to CLD. Transferred to [facility] for hepatology evaluation.     On presentation to [facility] MICU, patient is overtly jaundiced but with intact mentation (a&o x4). Complains of lower back pain that she experienced from the long transport from OSH to [facility]. Also complains of increasing hunger and thirst (good appetite). Questionable historian, but understands the circumstance, stating she knows she is having new liver problems and is here for the hepatology team to evaluate her. Patient reports having good bowel movements and appropriate urinary voids. Specifically denies chest pain, increased work of breathing, increasing/worsening abdominal pain, fever/chills, nausea, vomiting, constipation, diarrhea, dysuria, suprapubic pain, recent IVDU (currently on suboxone), hallucinations (auditory and visual), tremors, seizures, loss of consciousness, headache, sensory/strength changes, abnormal bleeding.    Patient is from [place], [place]. Lives with boyfriend and 5 children. Of note, she calls her boyfriend her  but is not legally ; her closest relative is her sister. Substantial IVDU history (meth),  non-injectable opioid abuse. Recently stopped smoking history (smoked since 10 y/o). On suboxone per OSH report and per patient. Does not know if any active HIV, hepatitis infection. Family history of CAD, ACS, and cancer (unspecified).       Hospital/ICU Course:  Transferred from OSH to [facility] MICU for HLOC, hepatology evaluation, and SBO. On arrival, patient A&O x 4 with diffuse abdominal pain w/o rebound, on 5 L NC. CTAP demonstrated diffuse bowel air levels and dilatation c/w SBO. Bilateral lung effusions present with concerns for L lingula consolidation. AF VS HDS on presentation to [facility]. Bedside US without amenable ascites pocket for paracentesis. Gen Surg evaluated w/o need for surgical intervention. Started on CAP coverage for pulmonary findings. Lactulose titrated to 4-5 BM/day, rifaximin. Hepatology consulted and work up initiated. Patient desaturated on 5 L NC and subsequently stepped up to 10 L HFNC with eventual intubation. Found to be mucus plugging per CXR (L lung atelectasis with L midline shift). Levophed started during intubation, weaned off. Patient respiratory status improving, but failing SBT.      Interval History/Significant Events: Patient intubated and started on Levophed overnight due to L lung atelectasis and likely mucus plugging. Oxygenation improving. Failed SBT.    Review of Systems   Unable to perform ROS: Intubated   Objective:     Vital Signs (Most Recent):  Temp: 99.1 °F (37.3 °C) (03/30/22 0715)  Pulse: (!) 114 (03/30/22 1552)  Resp: (!) 25 (03/30/22 1552)  BP: 99/63 (03/30/22 1552)  SpO2: (!) 94 % (03/30/22 1552)   Vital Signs (24h Range):  Temp:  [97.6 °F (36.4 °C)-99.1 °F (37.3 °C)] 99.1 °F (37.3 °C)  Pulse:  [101-141] 114  Resp:  [16-41] 25  SpO2:  [86 %-100 %] 94 %  BP: ()/(49-83) 99/63   Weight: 32 kg (70 lb 8.8 oz)  Body mass index is 13.78 kg/m².      Intake/Output Summary (Last 24 hours) at 3/30/2022 1636  Last data filed at 3/30/2022 1030  Gross per 24 hour   Intake  1473.03 ml   Output 1612 ml   Net -138.97 ml       Physical Exam  Constitutional:       General: She is not in acute distress.     Appearance: She is ill-appearing. She is not toxic-appearing or diaphoretic.      Comments: Cachetic, temporal wasting, jaundiced, weak, but in no apparent distress   HENT:      Head: Normocephalic.      Comments: Intubated     Nose: Nose normal.   Eyes:      General: Scleral icterus present.      Extraocular Movements: Extraocular movements intact.      Pupils: Pupils are equal, round, and reactive to light.   Cardiovascular:      Rate and Rhythm: Regular rhythm. Tachycardia present.      Pulses: Normal pulses.      Heart sounds: Normal heart sounds. No murmur heard.  Pulmonary:      Effort: Pulmonary effort is normal. No respiratory distress.      Breath sounds: Normal breath sounds. No stridor. No wheezing or rales.   Abdominal:      General: Abdomen is flat. There is distension.      Palpations: Abdomen is soft.      Tenderness: There is abdominal tenderness (diffuse tenderness, intermittently). There is no guarding or rebound.   Musculoskeletal:         General: No swelling or tenderness. Normal range of motion.      Cervical back: Normal range of motion. No rigidity or tenderness.      Right lower leg: No edema.      Left lower leg: No edema.   Skin:     General: Skin is warm and dry.      Coloration: Skin is jaundiced.      Findings: Bruising and rash (petecchial rash on back) present.   Neurological:      General: No focal deficit present.      Mental Status: She is alert and oriented to person, place, and time. Mental status is at baseline.      Cranial Nerves: No cranial nerve deficit.      Sensory: No sensory deficit.      Motor: No weakness.      Comments: Slightly sleepy, very tired   Psychiatric:         Mood and Affect: Mood normal.         Behavior: Behavior normal.         Thought Content: Thought content normal.       Vents:  Vent Mode: A/C (03/30/22 7639)  Ventilator  Initiated: Yes (03/30/22 0141)  Set Rate: 16 BPM (03/30/22 1552)  Vt Set: 350 mL (03/30/22 1552)  PEEP/CPAP: 5 cmH20 (03/30/22 1552)  Oxygen Concentration (%): 30 (03/30/22 1552)  Peak Airway Pressure: 24 cmH2O (03/30/22 1552)  Plateau Pressure: 20 cmH20 (03/30/22 1552)  Total Ve: 8.76 mL (03/30/22 1552)  Negative Inspiratory Force (cm H2O): 0 (03/30/22 1552)  F/VT Ratio<105 (RSBI): (!) 68.31 (03/30/22 1552)  Lines/Drains/Airways       Peripherally Inserted Central Catheter Line  Duration             PICC Triple Lumen left basilic -- days              Drain  Duration                  Urethral Catheter 03/28/22 1848 Latex 1 day              Airway  Duration                  Airway - Non-Surgical 03/30/22 0130 Endotracheal Tube <1 day       Airway Anesthesia 03/30/22 <1 day                  Significant Labs:    CBC/Anemia Profile:  Recent Labs   Lab 03/28/22 2127 03/29/22  0820 03/30/22  0425   WBC 9.25 10.08 15.32*   HGB 8.2* 8.7* 7.8*   HCT 25.3* 27.9* 25.4*   * 158 202   * 110* 110*   RDW 27.6* 27.7* 27.1*        Chemistries:  Recent Labs   Lab 03/28/22 2127 03/29/22  0310 03/30/22  0425    139 138   K 3.7 3.6 3.2*    100 99   CO2 27 29 30*   BUN 11 11 14   CREATININE 0.4* 0.4* 0.4*   CALCIUM 8.8 9.4 8.6*   ALBUMIN 3.1* 3.2* 2.7*   PROT 5.3* 5.7* 5.5*   BILITOT 9.3* 9.3* 6.3*   ALKPHOS 882* 949* 869*   ALT 71* 79* 78*   * 106* 79*   MG 1.7 1.7 1.7   PHOS 3.7 4.0 4.3       All pertinent labs within the past 24 hours have been reviewed.    Significant Imaging:  I have reviewed all pertinent imaging results/findings within the past 24 hours.      ABG  No results for input(s): PH, PO2, PCO2, HCO3, BE in the last 168 hours.  Assessment/Plan:     Psychiatric  Polysubstance abuse  Opioid abuse  Tobacco abuse  Methamphetamine abuse    Noted to be on suboxone through suboxone clinic. Unclear historical path.    - Addiction Psych consult for suboxone; per their report, patient is not on  suboxone regularly  - Urine toxicology negative at this time  - HIV negative  - Hep panel negative    Pulmonary  Acute hypoxemic respiratory failure  Bilateral pleural effusions  Pleural consolidation, L  Atelectasis  Mucus Plugging    No baseline home O2 per patient. Presenting with 5 L NC O2. Unclear etiology, though previous documentation suggestive of pleural effusion vs component of PNA. Unclear etiology as patient seems to be volume down on exam. No reported history of heart failure or COPD (though extensive tobacco use history). Etiology may be 2/2 to abdominal distention causing diaphragmatic pressure vs infectious etiology vs pleural effusion d/t third spacing vs infection? CT demonstrating bilateral pleural effusions and lingula consolidation; concerns for possible PNA    - 03/29 patient decompensated while on 5L > 10 HFNC > intubation; CXR demonstrating L lung atelectasis / mucus plugging  - Pulmonary hygiene, CPT, albuterol inhaler  - Cefepime and azithromycin for CAP coverage (recent hospitalization for pseudomonas coverage)  - Continuous pulse ox  - Monitor fever curve; given lack of infectious presentation, will defer abx course now  - Lactate wnl  - Monitor BCx  - RCx  - Aspiration precaution    Cardiac/Vascular  Paroxysmal tachycardia  Presenting with tachycardia to 120s. Noted in OSH previous progress note and was tempered with BB. Unclear etiology, potentially volume down driving sinus tachycardia as patient has been on NGT suction d/t pSBO with limited PO intake. Also appears to be extremely malnourished. In setting of potential acute pulmonary process, may be related to acute infection vs mucus plugging / atelectasis    - Repeat EKG demonstrating sinus tachycardia and abnormal R wave progression; no previous EKG for comparison  - Cardiac telemetry  - Electrolyte trend, K > 4, Mg > 2  - Will hold trial of IVF bolus until further evaluation regarding volume status (BNP elevated 1000+ on  admission)  - TTE revealing EF 35-40%, PAP 34,       GI  Generalized abdominal pain  H/o pSBO  H/o laparoscopic cholecystectomy    Patient with recent h/o pSBO at OSH,with good response to NGT decompression. Advanced to CLD. Initially complaining of RUQ abdominal pain at OSH on initial presentation. Now with mild, diffuse abdominal pain on palpation, but none at rest. Abdomen remains distended. Hypoactive bowel sounds.    - Repeat KUB / CTAP demonstrating significant bowel dilatation aw/ air-fluid levels c/w SBO  - Monitor BM, diet tolerance (currently with both BM and diet tolerance)  - FLD, ADAT  - General surgery evaluated, no role for surgical intervention  - Bedside US abdomen without note for ascites or other process  - Serial abdominal exams    Orthopedic  * Liver injury  Acute hepatic metabolic encephalopathy  Elevated transaminases  Elevated total bilirubin    37 y/o F with PMHx IVDU, polysubstance abuse presenting to [facility] [place] from [facility] for hepatology evaluation. Initially presented with concerns for acute liver failure (encephalopathy, elevated elevated elevation of LFTs, jaundice) vs pSBO vs sepsis 2/2 abdominal infection. NGT decompression of pSBO with good response, advanced to CLD. Started on broad spectrum abx and tapered. Started on lactulose and rifaximin with good response of encephalopathy, now A&O x 4 without signs of asterixis, tongue fasciculations, hallucinations. Transferred to [facility] for further hepatological evaluation.    - Hepatology consulted and following; appreciate assistance  - Acute hepatitis panel negative  - HIV negative  - US liver doppler / abdomen did not demonstrate appreciable biliary duct dilatation  - Lactulose 20 BID (titrate to 4-5 BMs per day)  - Rifaximin  - Ceruloplasmin, Copper pending   - ELIN, Anti-Sm negative  - PT/INR daily  - Trend CMP daily  - Trend CBC daily  - Monitor neurological status  - Ascites studies unremarkable for  SBP    Other  Failure to thrive in adult  Temporal wasting  Malnutrition    - Boost when tolerated  - Nutrition consult       Critical Care Daily Checklist:    A: Awake: RASS Goal/Actual Goal:    Actual: Wan Agitation Sedation Scale (RASS): Drowsy   B: Spontaneous Breathing Trial Performed?     C: SAT & SBT Coordinated?  SBT failed                      D: Delirium: CAM-ICU Overall CAM-ICU: Negative   E: Early Mobility Performed? Yes   F: Feeding Goal:    Status:     Current Diet Order   Procedures    Diet full liquid      AS: Analgesia/Sedation Fentanyl pushes   T: Thromboembolic Prophylaxis Lovenox   H: HOB > 300 Yes   U: Stress Ulcer Prophylaxis (if needed) Pepcid   G: Glucose Control POC SSI    B: Bowel Function Stool Occurrence: 1   I: Indwelling Catheter (Lines & Perez) Necessity PIV   D: De-escalation of Antimicrobials/Pharmacotherapies Cefepime, Azithromycin    Plan for the day/ETD CPT, aggressive respiratory support    Code Status:  Family/Goals of Care: Full Code         Critical secondary to Patient has a condition that poses threat to life and bodily function: Severe Respiratory Distress      Critical care was time spent personally by me on the following activities: 40 m development of treatment plan with patient or surrogate and bedside caregivers, discussions with consultants, evaluation of patient's response to treatment, examination of patient, ordering and performing treatments and interventions, ordering and review of laboratory studies, ordering and review of radiographic studies, pulse oximetry, re-evaluation of patient's condition. This critical care time did not overlap with that of any other provider or involve time for any procedures.     Geoffrey Gloria MD  Critical Care Medicine  [facility] - Cardiac Medical ICU

## 2022-03-30 NOTE — ASSESSMENT & PLAN NOTE
Bilateral pleural effusions  Pleural consolidation, L  Atelectasis  Mucus Plugging    No baseline home O2 per patient. Presenting with 5 L NC O2. Unclear etiology, though previous documentation suggestive of pleural effusion vs component of PNA. Unclear etiology as patient seems to be volume down on exam. No reported history of heart failure or COPD (though extensive tobacco use history). Etiology may be 2/2 to abdominal distention causing diaphragmatic pressure vs infectious etiology vs pleural effusion d/t third spacing vs infection? CT demonstrating bilateral pleural effusions and lingula consolidation; concerns for possible PNA    - 03/29 patient decompensated while on 5L > 10 HFNC > intubation; CXR demonstrating L lung atelectasis / mucus plugging  - Pulmonary hygiene, CPT, albuterol inhaler  - Cefepime and azithromycin for CAP coverage (recent hospitalization for pseudomonas coverage)  - Continuous pulse ox  - Monitor fever curve; given lack of infectious presentation, will defer abx course now  - Lactate wnl  - Monitor BCx  - RCx  - Aspiration precaution

## 2022-03-30 NOTE — SUBJECTIVE & OBJECTIVE
Interval History/Significant Events: Patient intubated and started on Levophed overnight due to L lung atelectasis and likely mucus plugging. Oxygenation improving. Failed SBT.    Review of Systems   Unable to perform ROS: Intubated   Objective:     Vital Signs (Most Recent):  Temp: 99.1 °F (37.3 °C) (03/30/22 0715)  Pulse: (!) 114 (03/30/22 1552)  Resp: (!) 25 (03/30/22 1552)  BP: 99/63 (03/30/22 1552)  SpO2: (!) 94 % (03/30/22 1552)   Vital Signs (24h Range):  Temp:  [97.6 °F (36.4 °C)-99.1 °F (37.3 °C)] 99.1 °F (37.3 °C)  Pulse:  [101-141] 114  Resp:  [16-41] 25  SpO2:  [86 %-100 %] 94 %  BP: ()/(49-83) 99/63   Weight: 32 kg (70 lb 8.8 oz)  Body mass index is 13.78 kg/m².      Intake/Output Summary (Last 24 hours) at 3/30/2022 1636  Last data filed at 3/30/2022 1030  Gross per 24 hour   Intake 1473.03 ml   Output 1612 ml   Net -138.97 ml       Physical Exam  Constitutional:       General: She is not in acute distress.     Appearance: She is ill-appearing. She is not toxic-appearing or diaphoretic.      Comments: Cachetic, temporal wasting, jaundiced, weak, but in no apparent distress   HENT:      Head: Normocephalic.      Comments: Intubated     Nose: Nose normal.   Eyes:      General: Scleral icterus present.      Extraocular Movements: Extraocular movements intact.      Pupils: Pupils are equal, round, and reactive to light.   Cardiovascular:      Rate and Rhythm: Regular rhythm. Tachycardia present.      Pulses: Normal pulses.      Heart sounds: Normal heart sounds. No murmur heard.  Pulmonary:      Effort: Pulmonary effort is normal. No respiratory distress.      Breath sounds: Normal breath sounds. No stridor. No wheezing or rales.   Abdominal:      General: Abdomen is flat. There is distension.      Palpations: Abdomen is soft.      Tenderness: There is abdominal tenderness (diffuse tenderness, intermittently). There is no guarding or rebound.   Musculoskeletal:         General: No swelling or  tenderness. Normal range of motion.      Cervical back: Normal range of motion. No rigidity or tenderness.      Right lower leg: No edema.      Left lower leg: No edema.   Skin:     General: Skin is warm and dry.      Coloration: Skin is jaundiced.      Findings: Bruising and rash (petecchial rash on back) present.   Neurological:      General: No focal deficit present.      Mental Status: She is alert and oriented to person, place, and time. Mental status is at baseline.      Cranial Nerves: No cranial nerve deficit.      Sensory: No sensory deficit.      Motor: No weakness.      Comments: Slightly sleepy, very tired   Psychiatric:         Mood and Affect: Mood normal.         Behavior: Behavior normal.         Thought Content: Thought content normal.       Vents:  Vent Mode: A/C (03/30/22 1552)  Ventilator Initiated: Yes (03/30/22 0141)  Set Rate: 16 BPM (03/30/22 1552)  Vt Set: 350 mL (03/30/22 1552)  PEEP/CPAP: 5 cmH20 (03/30/22 1552)  Oxygen Concentration (%): 30 (03/30/22 1552)  Peak Airway Pressure: 24 cmH2O (03/30/22 1552)  Plateau Pressure: 20 cmH20 (03/30/22 1552)  Total Ve: 8.76 mL (03/30/22 1552)  Negative Inspiratory Force (cm H2O): 0 (03/30/22 1552)  F/VT Ratio<105 (RSBI): (!) 68.31 (03/30/22 1552)  Lines/Drains/Airways       Peripherally Inserted Central Catheter Line  Duration             PICC Triple Lumen left basilic -- days              Drain  Duration                  Urethral Catheter 03/28/22 1848 Latex 1 day              Airway  Duration                  Airway - Non-Surgical 03/30/22 0130 Endotracheal Tube <1 day       Airway Anesthesia 03/30/22 <1 day                  Significant Labs:    CBC/Anemia Profile:  Recent Labs   Lab 03/28/22 2127 03/29/22  0820 03/30/22  0425   WBC 9.25 10.08 15.32*   HGB 8.2* 8.7* 7.8*   HCT 25.3* 27.9* 25.4*   * 158 202   * 110* 110*   RDW 27.6* 27.7* 27.1*        Chemistries:  Recent Labs   Lab 03/28/22 2127 03/29/22  0310 03/30/22  0425   NA  138 139 138   K 3.7 3.6 3.2*    100 99   CO2 27 29 30*   BUN 11 11 14   CREATININE 0.4* 0.4* 0.4*   CALCIUM 8.8 9.4 8.6*   ALBUMIN 3.1* 3.2* 2.7*   PROT 5.3* 5.7* 5.5*   BILITOT 9.3* 9.3* 6.3*   ALKPHOS 882* 949* 869*   ALT 71* 79* 78*   * 106* 79*   MG 1.7 1.7 1.7   PHOS 3.7 4.0 4.3       All pertinent labs within the past 24 hours have been reviewed.    Significant Imaging:  I have reviewed all pertinent imaging results/findings within the past 24 hours.

## 2022-03-30 NOTE — ASSESSMENT & PLAN NOTE
Acute hepatic metabolic encephalopathy  Elevated transaminases  Elevated total bilirubin    35 y/o F with PMHx IVDU, polysubstance abuse presenting to [facility] as transfer from [facility] for hepatology evaluation. Initially presented with concerns for acute liver failure (encephalopathy, elevated elevated elevation of LFTs, jaundice) vs pSBO vs sepsis 2/2 abdominal infection. NGT decompression of pSBO with good response, advanced to CLD. Started on broad spectrum abx and tapered. Started on lactulose and rifaximin with good response of encephalopathy, now A&O x 4 without signs of asterixis, tongue fasciculations, hallucinations. Transferred to [facility] for further hepatological evaluation.    - Hepatology consulted and following; appreciate assistance  - Acute hepatitis panel negative  - HIV negative  - US liver doppler / abdomen did not demonstrate appreciable biliary duct dilatation  - Lactulose 20 BID (titrate to 4-5 BMs per day)  - Rifaximin  - Ceruloplasmin, Copper pending   - ELIN, Anti-Sm negative  - PT/INR daily  - Trend CMP daily  - Trend CBC daily  - Monitor neurological status  - Ascites studies unremarkable for SBP

## 2022-03-30 NOTE — ASSESSMENT & PLAN NOTE
35yo female with a medical history significant for IVDU on suboxone, polysubstance use (meth, opioids, tobacco) and gastric ulcers s/p recent cholecystectomy at OSH on 3/8 who presented to [facility] as transfer to MICU for hepatology evaluation given increasing jaundice, lethargy, AM and increased oxygen requirement. General surgery consulted given concern for possible small bowel obstruction.     - Having bowel movements  - Keep NGT to LIWS while intubated  - Please call with questions or concerns

## 2022-03-30 NOTE — ASSESSMENT & PLAN NOTE
Opioid abuse  Tobacco abuse  Methamphetamine abuse    Noted to be on suboxone through suboxone clinic. Unclear historical path.    - Addiction Psych consult for suboxone; per their report, patient is not on suboxone regularly  - Urine toxicology negative at this time  - HIV negative  - Hep panel negative

## 2022-03-30 NOTE — CARE UPDATE
Patient desatted on 5L NC to about 85%. Bumped up to HFNC at around 10L and satting low 90s. She has very coarse breath sounds throughout on the right and significantly reduced airflow on the left. Tachypneic worsened from prior, no pain reported except with coughing. She is not coughing anything up.     Respiratory at bedside.   Plan:  - 3% saline neb  - CPT  - NT suction  - CXR  - Cancel stepdown for now  - Mucinex    1AM Update:  Patient was stable on about 10L HFNC for 1-2 hours, appeared more comfortable and was conversing with us. CXR with complete left lung atelectasis. She was stable at the time, so continued with aggressive pulmonary toileting with RT as above. Around 1AM, her sats dropped again. Her blood pressure dropped as well, requiring initiation of vasopressors. At this time, anesthesia was called and she was intubated. She required about 30mcg of Levo boluses daniel-intubation, and afterward was required and increase to about 0.1mcg/kg/min. Oxygen sats responded appropriately with mechanical ventilation. She had large amounts of secretions which were suctioned per the ET tube. Likely etiology left lung atelectasis 2/2 mucus plugging.     - Re-eval for need for bronchoscopy during the day if no resolution of likely plugging

## 2022-03-31 LAB
ALBUMIN SERPL BCP-MCNC: 2.7 G/DL (ref 3.5–5.2)
ALBUMIN SERPL BCP-MCNC: 2.8 G/DL (ref 3.5–5.2)
ALP SERPL-CCNC: 1037 U/L (ref 55–135)
ALP SERPL-CCNC: 982 U/L (ref 55–135)
ALT SERPL W/O P-5'-P-CCNC: 80 U/L (ref 10–44)
ALT SERPL W/O P-5'-P-CCNC: 80 U/L (ref 10–44)
ANION GAP SERPL CALC-SCNC: 10 MMOL/L (ref 8–16)
ANION GAP SERPL CALC-SCNC: 6 MMOL/L (ref 8–16)
AST SERPL-CCNC: 85 U/L (ref 10–40)
AST SERPL-CCNC: 86 U/L (ref 10–40)
BASOPHILS # BLD AUTO: 0.03 K/UL (ref 0–0.2)
BASOPHILS NFR BLD: 0.3 % (ref 0–1.9)
BILIRUB SERPL-MCNC: 7.2 MG/DL (ref 0.1–1)
BILIRUB SERPL-MCNC: 7.4 MG/DL (ref 0.1–1)
BUN SERPL-MCNC: 11 MG/DL (ref 6–20)
BUN SERPL-MCNC: 13 MG/DL (ref 6–20)
CALCIUM SERPL-MCNC: 8.7 MG/DL (ref 8.7–10.5)
CALCIUM SERPL-MCNC: 9.1 MG/DL (ref 8.7–10.5)
CHLORIDE SERPL-SCNC: 97 MMOL/L (ref 95–110)
CHLORIDE SERPL-SCNC: 97 MMOL/L (ref 95–110)
CO2 SERPL-SCNC: 31 MMOL/L (ref 23–29)
CO2 SERPL-SCNC: 31 MMOL/L (ref 23–29)
COPPER SERPL-MCNC: 524 UG/L (ref 810–1990)
CREAT SERPL-MCNC: 0.4 MG/DL (ref 0.5–1.4)
CREAT SERPL-MCNC: 0.4 MG/DL (ref 0.5–1.4)
DIFFERENTIAL METHOD: ABNORMAL
EOSINOPHIL # BLD AUTO: 0 K/UL (ref 0–0.5)
EOSINOPHIL NFR BLD: 0.1 % (ref 0–8)
ERYTHROCYTE [DISTWIDTH] IN BLOOD BY AUTOMATED COUNT: 27 % (ref 11.5–14.5)
EST. GFR  (AFRICAN AMERICAN): >60 ML/MIN/1.73 M^2
EST. GFR  (AFRICAN AMERICAN): >60 ML/MIN/1.73 M^2
EST. GFR  (NON AFRICAN AMERICAN): >60 ML/MIN/1.73 M^2
EST. GFR  (NON AFRICAN AMERICAN): >60 ML/MIN/1.73 M^2
GLUCOSE SERPL-MCNC: 114 MG/DL (ref 70–110)
GLUCOSE SERPL-MCNC: 114 MG/DL (ref 70–110)
HCT VFR BLD AUTO: 24.5 % (ref 37–48.5)
HGB BLD-MCNC: 7.7 G/DL (ref 12–16)
IMM GRANULOCYTES # BLD AUTO: 0.17 K/UL (ref 0–0.04)
IMM GRANULOCYTES NFR BLD AUTO: 1.5 % (ref 0–0.5)
INR PPP: 1.2 (ref 0.8–1.2)
LYMPHOCYTES # BLD AUTO: 1.4 K/UL (ref 1–4.8)
LYMPHOCYTES NFR BLD: 12.3 % (ref 18–48)
MAGNESIUM SERPL-MCNC: 1.9 MG/DL (ref 1.6–2.6)
MCH RBC QN AUTO: 33.6 PG (ref 27–31)
MCHC RBC AUTO-ENTMCNC: 31.4 G/DL (ref 32–36)
MCV RBC AUTO: 107 FL (ref 82–98)
MONOCYTES # BLD AUTO: 0.5 K/UL (ref 0.3–1)
MONOCYTES NFR BLD: 4.4 % (ref 4–15)
NEUTROPHILS # BLD AUTO: 9 K/UL (ref 1.8–7.7)
NEUTROPHILS NFR BLD: 81.4 % (ref 38–73)
NRBC BLD-RTO: 0 /100 WBC
PHOSPHATE SERPL-MCNC: 4.1 MG/DL (ref 2.7–4.5)
PLATELET # BLD AUTO: 219 K/UL (ref 150–450)
PMV BLD AUTO: 11.6 FL (ref 9.2–12.9)
POTASSIUM SERPL-SCNC: 3.4 MMOL/L (ref 3.5–5.1)
POTASSIUM SERPL-SCNC: 4.3 MMOL/L (ref 3.5–5.1)
PROT SERPL-MCNC: 5.5 G/DL (ref 6–8.4)
PROT SERPL-MCNC: 5.8 G/DL (ref 6–8.4)
PROTHROMBIN TIME: 12.7 SEC (ref 9–12.5)
RBC # BLD AUTO: 2.29 M/UL (ref 4–5.4)
SODIUM SERPL-SCNC: 134 MMOL/L (ref 136–145)
SODIUM SERPL-SCNC: 138 MMOL/L (ref 136–145)
WBC # BLD AUTO: 11.03 K/UL (ref 3.9–12.7)

## 2022-03-31 PROCEDURE — 85025 COMPLETE CBC W/AUTO DIFF WBC: CPT

## 2022-03-31 PROCEDURE — 94640 AIRWAY INHALATION TREATMENT: CPT

## 2022-03-31 PROCEDURE — 97530 THERAPEUTIC ACTIVITIES: CPT

## 2022-03-31 PROCEDURE — 80053 COMPREHEN METABOLIC PANEL: CPT | Mod: 91

## 2022-03-31 PROCEDURE — 20000000 HC ICU ROOM

## 2022-03-31 PROCEDURE — 25000003 PHARM REV CODE 250: Performed by: STUDENT IN AN ORGANIZED HEALTH CARE EDUCATION/TRAINING PROGRAM

## 2022-03-31 PROCEDURE — 87077 CULTURE AEROBIC IDENTIFY: CPT | Performed by: STUDENT IN AN ORGANIZED HEALTH CARE EDUCATION/TRAINING PROGRAM

## 2022-03-31 PROCEDURE — 63600175 PHARM REV CODE 636 W HCPCS

## 2022-03-31 PROCEDURE — 25000242 PHARM REV CODE 250 ALT 637 W/ HCPCS

## 2022-03-31 PROCEDURE — 97535 SELF CARE MNGMENT TRAINING: CPT

## 2022-03-31 PROCEDURE — 87205 SMEAR GRAM STAIN: CPT | Performed by: STUDENT IN AN ORGANIZED HEALTH CARE EDUCATION/TRAINING PROGRAM

## 2022-03-31 PROCEDURE — 83735 ASSAY OF MAGNESIUM: CPT

## 2022-03-31 PROCEDURE — 27000221 HC OXYGEN, UP TO 24 HOURS

## 2022-03-31 PROCEDURE — 63600175 PHARM REV CODE 636 W HCPCS: Performed by: STUDENT IN AN ORGANIZED HEALTH CARE EDUCATION/TRAINING PROGRAM

## 2022-03-31 PROCEDURE — 85610 PROTHROMBIN TIME: CPT | Performed by: STUDENT IN AN ORGANIZED HEALTH CARE EDUCATION/TRAINING PROGRAM

## 2022-03-31 PROCEDURE — 99900035 HC TECH TIME PER 15 MIN (STAT)

## 2022-03-31 PROCEDURE — S4991 NICOTINE PATCH NONLEGEND: HCPCS

## 2022-03-31 PROCEDURE — 99900025 HC BRONCHOSCOPY-ASST (STAT)

## 2022-03-31 PROCEDURE — 99900026 HC AIRWAY MAINTENANCE (STAT)

## 2022-03-31 PROCEDURE — 27202055 HC BRONCHOSCOPE, DISP

## 2022-03-31 PROCEDURE — 99291 PR CRITICAL CARE, E/M 30-74 MINUTES: ICD-10-PCS | Mod: 25,,, | Performed by: STUDENT IN AN ORGANIZED HEALTH CARE EDUCATION/TRAINING PROGRAM

## 2022-03-31 PROCEDURE — 94003 VENT MGMT INPAT SUBQ DAY: CPT

## 2022-03-31 PROCEDURE — 31622 DX BRONCHOSCOPE/WASH: CPT | Mod: ,,, | Performed by: STUDENT IN AN ORGANIZED HEALTH CARE EDUCATION/TRAINING PROGRAM

## 2022-03-31 PROCEDURE — 31622 PR BRONCHOSCOPY,DIAGNOSTIC: ICD-10-PCS | Mod: ,,, | Performed by: STUDENT IN AN ORGANIZED HEALTH CARE EDUCATION/TRAINING PROGRAM

## 2022-03-31 PROCEDURE — 94668 MNPJ CHEST WALL SBSQ: CPT

## 2022-03-31 PROCEDURE — 97116 GAIT TRAINING THERAPY: CPT

## 2022-03-31 PROCEDURE — 87070 CULTURE OTHR SPECIMN AEROBIC: CPT | Performed by: STUDENT IN AN ORGANIZED HEALTH CARE EDUCATION/TRAINING PROGRAM

## 2022-03-31 PROCEDURE — 87186 SC STD MICRODIL/AGAR DIL: CPT | Performed by: STUDENT IN AN ORGANIZED HEALTH CARE EDUCATION/TRAINING PROGRAM

## 2022-03-31 PROCEDURE — 25000003 PHARM REV CODE 250

## 2022-03-31 PROCEDURE — 99291 CRITICAL CARE FIRST HOUR: CPT | Mod: 25,,, | Performed by: STUDENT IN AN ORGANIZED HEALTH CARE EDUCATION/TRAINING PROGRAM

## 2022-03-31 PROCEDURE — 94761 N-INVAS EAR/PLS OXIMETRY MLT: CPT

## 2022-03-31 PROCEDURE — 84100 ASSAY OF PHOSPHORUS: CPT

## 2022-03-31 RX ORDER — POTASSIUM CHLORIDE 7.45 MG/ML
10 INJECTION INTRAVENOUS
Status: DISCONTINUED | OUTPATIENT
Start: 2022-03-31 | End: 2022-03-31

## 2022-03-31 RX ORDER — LIDOCAINE HYDROCHLORIDE 10 MG/ML
10 INJECTION INFILTRATION; PERINEURAL
Status: DISCONTINUED | OUTPATIENT
Start: 2022-03-31 | End: 2022-04-04

## 2022-03-31 RX ORDER — POTASSIUM CHLORIDE 14.9 MG/ML
20 INJECTION INTRAVENOUS
Status: COMPLETED | OUTPATIENT
Start: 2022-03-31 | End: 2022-03-31

## 2022-03-31 RX ORDER — MIDAZOLAM HYDROCHLORIDE 1 MG/ML
2 INJECTION INTRAMUSCULAR; INTRAVENOUS
Status: DISCONTINUED | OUTPATIENT
Start: 2022-03-31 | End: 2022-04-02

## 2022-03-31 RX ORDER — MAGNESIUM SULFATE HEPTAHYDRATE 40 MG/ML
2 INJECTION, SOLUTION INTRAVENOUS ONCE
Status: COMPLETED | OUTPATIENT
Start: 2022-03-31 | End: 2022-03-31

## 2022-03-31 RX ORDER — FENTANYL CITRATE 50 UG/ML
100 INJECTION, SOLUTION INTRAMUSCULAR; INTRAVENOUS
Status: DISCONTINUED | OUTPATIENT
Start: 2022-03-31 | End: 2022-04-02

## 2022-03-31 RX ADMIN — Medication 6 MG: at 08:03

## 2022-03-31 RX ADMIN — FENTANYL CITRATE 25 MCG: 50 INJECTION INTRAMUSCULAR; INTRAVENOUS at 11:03

## 2022-03-31 RX ADMIN — CEFEPIME HYDROCHLORIDE 1 G: 1 INJECTION, SOLUTION INTRAVENOUS at 01:03

## 2022-03-31 RX ADMIN — POTASSIUM CHLORIDE 20 MEQ: 200 INJECTION, SOLUTION INTRAVENOUS at 07:03

## 2022-03-31 RX ADMIN — SODIUM CHLORIDE SOLN NEBU 3% 4 ML: 3 NEBU SOLN at 12:03

## 2022-03-31 RX ADMIN — MUPIROCIN: 20 OINTMENT TOPICAL at 08:03

## 2022-03-31 RX ADMIN — ALBUTEROL SULFATE 2.5 MG: 2.5 SOLUTION RESPIRATORY (INHALATION) at 12:03

## 2022-03-31 RX ADMIN — FENTANYL CITRATE 75 MCG: 50 INJECTION INTRAMUSCULAR; INTRAVENOUS at 09:03

## 2022-03-31 RX ADMIN — ALBUTEROL SULFATE 2.5 MG: 2.5 SOLUTION RESPIRATORY (INHALATION) at 07:03

## 2022-03-31 RX ADMIN — MAGNESIUM SULFATE 2 G: 2 INJECTION INTRAVENOUS at 07:03

## 2022-03-31 RX ADMIN — GUAIFENESIN 200 MG: 100 SOLUTION ORAL at 05:03

## 2022-03-31 RX ADMIN — FAMOTIDINE 20 MG: 20 TABLET ORAL at 08:03

## 2022-03-31 RX ADMIN — FUROSEMIDE 40 MG: 10 INJECTION, SOLUTION INTRAMUSCULAR; INTRAVENOUS at 08:03

## 2022-03-31 RX ADMIN — ALBUTEROL SULFATE 2.5 MG: 2.5 SOLUTION RESPIRATORY (INHALATION) at 01:03

## 2022-03-31 RX ADMIN — MIDAZOLAM 2 MG: 1 INJECTION INTRAMUSCULAR; INTRAVENOUS at 09:03

## 2022-03-31 RX ADMIN — FENTANYL CITRATE 25 MCG: 50 INJECTION INTRAMUSCULAR; INTRAVENOUS at 04:03

## 2022-03-31 RX ADMIN — POTASSIUM CHLORIDE 20 MEQ: 200 INJECTION, SOLUTION INTRAVENOUS at 10:03

## 2022-03-31 RX ADMIN — LIDOCAINE HYDROCHLORIDE 10 ML: 10 INJECTION, SOLUTION EPIDURAL; INFILTRATION; INTRACAUDAL; PERINEURAL at 09:03

## 2022-03-31 RX ADMIN — LACTULOSE 20 G: 20 SOLUTION ORAL at 08:03

## 2022-03-31 RX ADMIN — SODIUM CHLORIDE SOLN NEBU 3% 4 ML: 3 NEBU SOLN at 07:03

## 2022-03-31 RX ADMIN — FUROSEMIDE 40 MG: 10 INJECTION, SOLUTION INTRAMUSCULAR; INTRAVENOUS at 07:03

## 2022-03-31 RX ADMIN — AZITHROMYCIN 330 MG: 900 POWDER, FOR SUSPENSION ORAL at 09:03

## 2022-03-31 RX ADMIN — CEFEPIME HYDROCHLORIDE 1 G: 1 INJECTION, SOLUTION INTRAVENOUS at 10:03

## 2022-03-31 RX ADMIN — NICOTINE 1 PATCH: 14 PATCH, EXTENDED RELEASE TRANSDERMAL at 08:03

## 2022-03-31 RX ADMIN — HYDROXYZINE HYDROCHLORIDE 25 MG: 25 TABLET ORAL at 01:03

## 2022-03-31 RX ADMIN — GUAIFENESIN 200 MG: 100 SOLUTION ORAL at 01:03

## 2022-03-31 RX ADMIN — ALUMINUM HYDROXIDE, MAGNESIUM HYDROXIDE, AND SIMETHICONE 30 ML: 200; 200; 20 SUSPENSION ORAL at 06:03

## 2022-03-31 RX ADMIN — ENOXAPARIN SODIUM 30 MG: 30 INJECTION, SOLUTION INTRAVENOUS; SUBCUTANEOUS at 05:03

## 2022-03-31 RX ADMIN — LORAZEPAM 0.5 MG: 0.5 TABLET ORAL at 08:03

## 2022-03-31 RX ADMIN — POTASSIUM CHLORIDE 20 MEQ: 200 INJECTION, SOLUTION INTRAVENOUS at 01:03

## 2022-03-31 RX ADMIN — CEFEPIME HYDROCHLORIDE 1 G: 1 INJECTION, SOLUTION INTRAVENOUS at 05:03

## 2022-03-31 RX ADMIN — SODIUM CHLORIDE SOLN NEBU 3% 4 ML: 3 NEBU SOLN at 01:03

## 2022-03-31 RX ADMIN — RIFAXIMIN 550 MG: 550 TABLET ORAL at 08:03

## 2022-03-31 RX ADMIN — GUAIFENESIN 200 MG: 100 SOLUTION ORAL at 10:03

## 2022-03-31 NOTE — PROGRESS NOTES
[facility] - Cardiac Medical ICU  Critical Care Medicine  Progress Note    Patient Name: Carolina Baldwin  MRN: [id number]  Admission Date: 3/28/2022  Hospital Length of Stay: 3 days  Code Status: Full Code  Attending Provider: Pawel Velez MD  Primary Care Provider: Primary Doctor No   Principal Problem: Liver injury    Subjective:     HPI:  37 y/o F with PMHx IVDU on suboxone, polysubstance use (meth, opioids, tobacco), gastric ulcers initially presented to OSH for increasing jaundice, lethargy, AMS, evaluation for pSBO, increased O2 requirement (3-4 L, no baseline home O2). Was started on broad spectrum Abx at OSH and eventually discontinued. Initially presented oriented x 2 and severe jaundice, elevated ammonia, but normal LFTs? Started on lactulose and rifaximin. NGT decompression at OSH for SBO with good response, advanced to CLD. Transferred to [facility] for hepatology evaluation.     On presentation to [facility] MICU, patient is overtly jaundiced but with intact mentation (a&o x4). Complains of lower back pain that she experienced from the long transport from OSH to [facility]. Also complains of increasing hunger and thirst (good appetite). Questionable historian, but understands the circumstance, stating she knows she is having new liver problems and is here for the hepatology team to evaluate her. Patient reports having good bowel movements and appropriate urinary voids. Specifically denies chest pain, increased work of breathing, increasing/worsening abdominal pain, fever/chills, nausea, vomiting, constipation, diarrhea, dysuria, suprapubic pain, recent IVDU (currently on suboxone), hallucinations (auditory and visual), tremors, seizures, loss of consciousness, headache, sensory/strength changes, abnormal bleeding.    Patient is from [place], [place]. Lives with boyfriend and 5 children. Of note, she calls her boyfriend her  but is not legally ; her closest relative is her sister. Substantial IVDU history (meth),  non-injectable opioid abuse. Recently stopped smoking history (smoked since 10 y/o). On suboxone per OSH report and per patient. Does not know if any active HIV, hepatitis infection. Family history of CAD, ACS, and cancer (unspecified).       Hospital/ICU Course:  Transferred from OSH to [facility] MICU for HLOC, hepatology evaluation, and SBO. On arrival, patient A&O x 4 with diffuse abdominal pain w/o rebound, on 5 L NC. CTAP demonstrated diffuse bowel air levels and dilatation c/w SBO. Bilateral lung effusions present with concerns for L lingula consolidation. AF VS HDS on presentation to [facility]. Bedside US without amenable ascites pocket for paracentesis. Gen Surg evaluated w/o need for surgical intervention. Started on CAP coverage for pulmonary findings. Lactulose titrated to 4-5 BM/day, rifaximin. Hepatology consulted and work up initiated. Patient desaturated on 5 L NC and subsequently stepped up to 10 L HFNC with eventual intubation. Found to be mucus plugging per CXR (L lung atelectasis with L midline shift). Levophed started during intubation, weaned off. Patient respiratory status improving. Bronch completed for mucus evacuation on 03/31.      Interval History/Significant Events: AF HDS NAEON. Continues to be intubated on 30/5.    Review of Systems   Unable to perform ROS: Intubated   Objective:     Vital Signs (Most Recent):  Temp: 98.5 °F (36.9 °C) (03/31/22 0730)  Pulse: 109 (03/31/22 1245)  Resp: (!) 23 (03/31/22 1245)  BP: 122/86 (03/31/22 1230)  SpO2: 100 % (03/31/22 1245)   Vital Signs (24h Range):  Temp:  [95.7 °F (35.4 °C)-98.5 °F (36.9 °C)] 98.5 °F (36.9 °C)  Pulse:  [] 109  Resp:  [19-33] 23  SpO2:  [87 %-100 %] 100 %  BP: ()/(63-94) 122/86   Weight: 32 kg (70 lb 8.8 oz)  Body mass index is 13.78 kg/m².      Intake/Output Summary (Last 24 hours) at 3/31/2022 1514  Last data filed at 3/31/2022 1200  Gross per 24 hour   Intake 278.65 ml   Output 2075 ml   Net -1796.35 ml       Physical  Exam  Constitutional:       General: She is not in acute distress.     Appearance: She is ill-appearing. She is not toxic-appearing or diaphoretic.      Comments: Cachetic, temporal wasting, jaundiced, weak, but in no apparent distress   HENT:      Head: Normocephalic.      Comments: Intubated     Nose: Nose normal.   Eyes:      General: Scleral icterus present.      Extraocular Movements: Extraocular movements intact.      Pupils: Pupils are equal, round, and reactive to light.   Cardiovascular:      Rate and Rhythm: Regular rhythm. Tachycardia present.      Pulses: Normal pulses.      Heart sounds: Normal heart sounds. No murmur heard.  Pulmonary:      Effort: Pulmonary effort is normal. No respiratory distress.      Breath sounds: Normal breath sounds. No stridor. No wheezing or rales.   Abdominal:      General: Abdomen is flat. There is distension.      Palpations: Abdomen is soft.      Tenderness: There is abdominal tenderness (diffuse tenderness, intermittently). There is no guarding or rebound.   Musculoskeletal:         General: No swelling or tenderness. Normal range of motion.      Cervical back: Normal range of motion. No rigidity or tenderness.      Right lower leg: No edema.      Left lower leg: No edema.   Skin:     General: Skin is warm and dry.      Coloration: Skin is jaundiced.      Findings: Bruising and rash (petecchial rash on back) present.   Neurological:      General: No focal deficit present.      Mental Status: She is alert and oriented to person, place, and time. Mental status is at baseline.      Cranial Nerves: No cranial nerve deficit.      Sensory: No sensory deficit.      Motor: No weakness.      Comments: Slightly sleepy, very tired   Psychiatric:         Mood and Affect: Mood normal.         Behavior: Behavior normal.         Thought Content: Thought content normal.       Vents:  Vent Mode: A/C (03/31/22 1115)  Ventilator Initiated: Yes (03/30/22 0141)  Set Rate: 16 BPM (03/31/22  1115)  Vt Set: 350 mL (03/31/22 1115)  Pressure Support: 0 cmH20 (03/31/22 1115)  PEEP/CPAP: 5 cmH20 (03/31/22 1115)  Oxygen Concentration (%): 40 (03/31/22 1245)  Peak Airway Pressure: 22 cmH2O (03/31/22 1115)  Plateau Pressure: 0 cmH20 (03/31/22 1115)  Total Ve: 8.81 mL (03/31/22 1115)  Negative Inspiratory Force (cm H2O): 0 (03/30/22 1552)  F/VT Ratio<105 (RSBI): (!) 86.61 (03/31/22 1115)  Lines/Drains/Airways       Peripherally Inserted Central Catheter Line  Duration             PICC Triple Lumen left basilic -- days              Drain  Duration                  Urethral Catheter 03/28/22 1848 Latex 2 days              Airway  Duration                  Airway - Non-Surgical 03/30/22 0130 Endotracheal Tube 1 day       Airway Anesthesia 03/30/22 1 day                  Significant Labs:    CBC/Anemia Profile:  Recent Labs   Lab 03/30/22  0425 03/31/22  0251   WBC 15.32* 11.03   HGB 7.8* 7.7*   HCT 25.4* 24.5*    219   * 107*   RDW 27.1* 27.0*        Chemistries:  Recent Labs   Lab 03/30/22  0425 03/30/22  1813 03/31/22  0251    137 138   K 3.2* 3.7 3.4*   CL 99 98 97   CO2 30* 29 31*   BUN 14 11 13   CREATININE 0.4* 0.4* 0.4*   CALCIUM 8.6* 9.2 9.1   ALBUMIN 2.7* 2.6* 2.7*   PROT 5.5* 5.4* 5.5*   BILITOT 6.3* 7.9* 7.4*   ALKPHOS 869* 923* 982*   ALT 78* 82* 80*   AST 79* 89* 86*   MG 1.7  --  1.9   PHOS 4.3  --  4.1       All pertinent labs within the past 24 hours have been reviewed.    Significant Imaging:  I have reviewed all pertinent imaging results/findings within the past 24 hours.      ABG  No results for input(s): PH, PO2, PCO2, HCO3, BE in the last 168 hours.  Assessment/Plan:     Psychiatric  Polysubstance abuse  Opioid abuse  Tobacco abuse  Methamphetamine abuse    Noted to be on suboxone through suboxone clinic. Unclear historical path.    - Addiction Psych consult for suboxone; per their report, patient is not on suboxone regularly  - Urine toxicology negative at this time  - HIV  negative  - Hep panel negative    Pulmonary  Acute hypoxemic respiratory failure  Bilateral pleural effusions  Pleural consolidation, L  Atelectasis  Mucus Plugging    No baseline home O2 per patient. Presenting with 5 L NC O2. Unclear etiology, though previous documentation suggestive of pleural effusion vs component of PNA. Unclear etiology as patient seems to be volume down on exam. No reported history of heart failure or COPD (though extensive tobacco use history). Etiology may be 2/2 to abdominal distention causing diaphragmatic pressure vs infectious etiology vs pleural effusion d/t third spacing vs infection? CT demonstrating bilateral pleural effusions and lingula consolidation; concerns for possible PNA    - 03/29 patient decompensated while on 5L > 10 HFNC > intubation; CXR demonstrating L lung atelectasis / mucus plugging  - 03/31 bronchoscopy for mucus evacuation; RCx sent, mild improvement on CXR following  - Pulmonary hygiene, CPT, albuterol inhaler  - Cefepime for CAP coverage (recent hospitalization for pseudomonas coverage)  - Continuous pulse ox  - Monitor fever curve; given lack of infectious presentation, will defer abx course now  - Lactate wnl  - Monitor BCx, RCx  - Aspiration precaution    Cardiac/Vascular  Paroxysmal tachycardia  Presenting with tachycardia to 120s. Noted in OSH previous progress note and was tempered with BB. Unclear etiology, potentially volume down driving sinus tachycardia as patient has been on NGT suction d/t pSBO with limited PO intake. Also appears to be extremely malnourished. In setting of potential acute pulmonary process, may be related to acute infection vs mucus plugging / atelectasis    - Repeat EKG demonstrating sinus tachycardia and abnormal R wave progression; no previous EKG for comparison  - Cardiac telemetry  - Electrolyte trend, K > 4, Mg > 2  - Will hold trial of IVF bolus until further evaluation regarding volume status (BNP elevated 1000+ on  admission)  - TTE revealing EF 35-40%, PAP 34,       GI  Generalized abdominal pain  H/o pSBO  H/o laparoscopic cholecystectomy    Patient with recent h/o pSBO at OSH,with good response to NGT decompression. Advanced to CLD. Initially complaining of RUQ abdominal pain at OSH on initial presentation. Now with mild, diffuse abdominal pain on palpation, but none at rest. Abdomen remains distended. Hypoactive bowel sounds.    - Repeat KUB / CTAP demonstrating significant bowel dilatation aw/ air-fluid levels c/w SBO  - Monitor BM, diet tolerance (currently with both BM and diet tolerance)  - FLD, ADAT  - General surgery evaluated, no role for surgical intervention  - Bedside US abdomen without note for ascites or other process  - Serial abdominal exams    Orthopedic  * Liver injury  Acute hepatic metabolic encephalopathy  Elevated transaminases  Elevated total bilirubin    35 y/o F with PMHx IVDU, polysubstance abuse presenting to [facility] as transfer from [facility] for hepatology evaluation. Initially presented with concerns for acute liver failure (encephalopathy, elevated elevated elevation of LFTs, jaundice) vs pSBO vs sepsis 2/2 abdominal infection. NGT decompression of pSBO with good response, advanced to CLD. Started on broad spectrum abx and tapered. Started on lactulose and rifaximin with good response of encephalopathy, now A&O x 4 without signs of asterixis, tongue fasciculations, hallucinations. Transferred to [facility] for further hepatological evaluation.    - Hepatology consulted and following; appreciate assistance  - Acute hepatitis panel negative  - HIV negative  - US liver doppler / abdomen did not demonstrate appreciable biliary duct dilatation  - Lactulose 20 BID (titrate to 4-5 BMs per day)  - Rifaximin  - Ceruloplasmin, Copper pending   - ELIN, Anti-Sm negative  - PT/INR daily  - Trend CMP daily  - Trend CBC daily  - Monitor neurological status  - Ascites studies unremarkable for  SBP    Other  Failure to thrive in adult  Temporal wasting  Malnutrition    - Boost when tolerated  - Nutrition consult       Critical Care Daily Checklist:    A: Awake: RASS Goal/Actual Goal:    Actual: Wan Agitation Sedation Scale (RASS): Drowsy   B: Spontaneous Breathing Trial Performed?     C: SAT & SBT Coordinated?  SBT failed                      D: Delirium: CAM-ICU Overall CAM-ICU: Positive   E: Early Mobility Performed? Yes   F: Feeding Goal: Goals: Meet % EEN, EPN by RD f/u date  Status: Nutrition Goal Status: new   Current Diet Order   Procedures    Diet full liquid      AS: Analgesia/Sedation Fentanyl PRN push   T: Thromboembolic Prophylaxis Lovenox   H: HOB > 300 Yes   U: Stress Ulcer Prophylaxis (if needed) Pepcid   G: Glucose Control SSI   B: Bowel Function Stool Occurrence: 1   I: Indwelling Catheter (Lines & Perez) Necessity PICC   D: De-escalation of Antimicrobials/Pharmacotherapies Azithromycin off    Plan for the day/ETD Continue pulmonary supportive care    Code Status:  Family/Goals of Care: Full Code       Critical secondary to Patient has a condition that poses threat to life and bodily function: Severe Respiratory Distress      Critical care was time spent personally by me on the following activities: 40 minutes development of treatment plan with patient or surrogate and bedside caregivers, discussions with consultants, evaluation of patient's response to treatment, examination of patient, ordering and performing treatments and interventions, ordering and review of laboratory studies, ordering and review of radiographic studies, pulse oximetry, re-evaluation of patient's condition. This critical care time did not overlap with that of any other provider or involve time for any procedures.     Geoffrey Gloria MD  Critical Care Medicine  [facility] - Cardiac Medical ICU

## 2022-03-31 NOTE — PLAN OF CARE
Patient remained intubated on ACVC overnight, rate 16, FiO2 30%, peep 5, SPO2 remained above 95%. PRN pain medication given for pain and anxiety, treatment moderately affective. Sinus tachycardia throughout shift, normotensive. Perez remains in place, continuing to diurese, 1000mL UO thus far per shift. Alert, attempting to communicate but limited due to ET tube. Following commands. Multiple Bms overnight.

## 2022-03-31 NOTE — EICU
Ramon received for bronchoscopy. Dr Schmid at bedside w/ Dr Velez for procedure. Time out performed. Vitals monitored throughout procedure and pt berta well.     0915: Scope in and bronch started  0920: 25 mcg Fentanyl IVP given x1  0922: 2 mg Versed IVP given x1  0924: 25 mcg Fentanyl IVP given x1  0926: 25 mcg Fentanyl IVP given x1  0932: Scope out, procedure end

## 2022-03-31 NOTE — PROCEDURES
Bronchoscopy Procedure Note      Date of Operation: 03/31/2022    Pre-op Diagnosis: Mucus plugging    Post-op Diagnosis: Mucus plugging    Surgeons: Mamie Schmid MD (fellow) &  Pawel Velez MD (staff)    Assistants: Geoffrey Gloria MD    Anesthesia:   Versed 2 mg (in divided doses)  Fentanyl 75mcg (in divided doses)    Operation: Flexible fiberoptic bronchoscopy    Specimen: None    Estimated Blood Loss: Minimal    Indications:   Mucus plugging, need for airway clearance    Consent:   The risks, benefits, complications, treatment options and expected outcomes were discussed with the patient's sister. The possibilities of reaction to medication, pulmonary aspiration, pneumothorax, bleeding, failure to diagnose their condition, and creating a complication requiring transfusion or operation were discussed with the patient. All questions were answered, and the consent was witnessed and placed in the chart.     Description of Procedure:  The patient was seen in  6th floor . The patient was taken to the pulmonary lab, identified as  Carolina Baldwin with 1985  and the procedure verified as Flexible Fiberoptic Bronchoscopy.  A Time Out was conducted, and the above information confirmed.     The patient was positioned supine, and the bronchoscope was passed through the endotracheal tube without difficulty. The scope was then passed into the trachea.  Careful inspection of the tracheal lumen was accomplished.     There were copious thick, green secretions that required frequent advancement and withdrawal of the bronchoscope to remove the thick secretions.     The scope was sequentially passed into the left main and then left upper and lower bronchi and segmental bronchi. The scope was then withdrawn and advanced into the right main bronchus and then into the RUL, RML, and RLL bronchi and segmental bronchi. After airway clearance the airways all appeared patent.    The scope was then withdrawn, and the patient  remained in the ICU in critical condition.     Mamie Schmid MD  Pulmonary and Critical Care Fellow  03/31/2022  9:50 AM

## 2022-03-31 NOTE — PLAN OF CARE
Recommendations     1. If/when medically feasible, initiate enteral nutrition. Rec'd Isosource 1.5 @ 35 mL/hr to provide 1260 calories, 57 g of protein, 642 mL fluid.  2. RD to monitor & follow-up.     Goals: Meet % EEN, EPN by RD f/u date  Nutrition Goal Status: new  Communication of RD Recs: reviewed with RN

## 2022-03-31 NOTE — CONSULTS
[facility] - Cardiac Medical [facility]  Adult Nutrition  Consult Note    SUMMARY     Recommendations    1. If/when medically feasible, initiate enteral nutrition. Rec'd Isosource 1.5 @ 35 mL/hr to provide 1260 calories, 57 g of protein, 642 mL fluid.  2. RD to monitor & follow-up.    Goals: Meet % EEN, EPN by RD f/u date  Nutrition Goal Status: new  Communication of RD Recs: reviewed with RN    Assessment and Plan    Nutrition Problem:  Inadequate energy intake     Related to (etiology):   Inability to consume sufficient energy    Signs and Symptoms (as evidenced by):   NPO    Interventions(treatment strategy):  Collaboration of nutrition care w/ other providers  Enteral nutrition     Nutrition Diagnosis Status:   New    Malnutrition Assessment    Orbital Region (Subcutaneous Fat Loss): severe depletion  Upper Arm Region (Subcutaneous Fat Loss): severe depletion   [place]ist Region (Muscle Loss): severe depletion  Clavicle Bone Region (Muscle Loss): severe depletion  Anterior Thigh Region (Muscle Loss): severe depletion     Reason for Assessment    Reason For Assessment: consult  Diagnosis: other (see comments) (Liver injury)  Relevant Medical History: IVDU  Interdisciplinary Rounds: attended    General Information Comments: Pt emergently intubated yesterday, no family at bedside. Currently w/ Full liquid + ONS ordered, however pt NPO. Unsure of PO intake PTA/UBW (no weight hx in chart). NFPE complete - pt w/ severe fat & muscle wasting, however RD unable to assess for malnutrition 2/2 unsure of nutrition background. Will monitor. Per huddle, possibly starting TFs today.  Nutrition Discharge Planning: Pending clinical course    Nutrition/Diet History    Spiritual, Cultural Beliefs, Yarsani Practices, Values that Affect Care: no  Factors Affecting Nutritional Intake: on mechanical ventilation, NPO    Anthropometrics    Temp: 98.5 °F (36.9 °C)  Height Method: Measured  Height: 5' (152.4 cm)  Height (inches): 60  in  Weight Method: Bed Scale  Weight: 32 kg (70 lb 8.8 oz)  Weight (lb): 70.55 lb  Ideal Body Weight (IBW), Female: 100 lb  % Ideal Body Weight, Female (lb): 70.55 %  BMI (Calculated): 13.8  BMI Grade: less than 16 protein-energy malnutrition grade III    Lab/Procedures/Meds    Pertinent Labs Reviewed: reviewed  Pertinent Medications Reviewed: reviewed  Pertinent Medications Comments: Levophed, Vasopressin    Estimated/Assessed Needs    Weight Used For Calorie Calculations: 32 kg (70 lb 8.8 oz)     Energy Calorie Requirements (kcal): 1196 kcal/d  Energy Need Method: [facility]     Protein Requirements: 42-55 g/d (1.3-1.7 g/kg)  Weight Used For Protein Calculations: 32 kg (70 lb 8.8 oz)     Estimated Fluid Requirement Method: other (see comments) (Per MD or 1 mL/kcal)  RDA Method (mL): 1196    Nutrition Prescription Ordered    Current Diet Order: NPO (FLD + ONS ordered)    Evaluation of Received Nutrient/Fluid Intake    I/O: -4.7L since admit    Comments: LBM: 3/30    Nutrition Risk    Level of Risk/Frequency of Follow-up: (1x/week)     Monitor and Evaluation    Food and Nutrient Intake: energy intake, food and beverage intake, enteral nutrition intake  Food and Nutrient Adminstration: enteral and parenteral nutrition administration, diet order  Physical Activity and Function: nutrition-related ADLs and IADLs  Anthropometric Measurements: weight, weight change  Biochemical Data, Medical Tests and Procedures: inflammatory profile, lipid profile, glucose/endocrine profile, gastrointestinal profile, electrolyte and renal panel  Nutrition-Focused Physical Findings: overall appearance     Nutrition Follow-Up    RD Follow-up?: Yes

## 2022-03-31 NOTE — NURSING
End of shift updates;  Patient started A/C 16/30%/5 at beginning of shift. Received Bronch and tolerated well.  Urine output throughout day was 1,175. Potassium and Magnesium was correct. Patient is alert and oriented x4, received prn med for pain and anxiety. Patient had mutiple bm.

## 2022-03-31 NOTE — ASSESSMENT & PLAN NOTE
Bilateral pleural effusions  Pleural consolidation, L  Atelectasis  Mucus Plugging    No baseline home O2 per patient. Presenting with 5 L NC O2. Unclear etiology, though previous documentation suggestive of pleural effusion vs component of PNA. Unclear etiology as patient seems to be volume down on exam. No reported history of heart failure or COPD (though extensive tobacco use history). Etiology may be 2/2 to abdominal distention causing diaphragmatic pressure vs infectious etiology vs pleural effusion d/t third spacing vs infection? CT demonstrating bilateral pleural effusions and lingula consolidation; concerns for possible PNA    - 03/29 patient decompensated while on 5L > 10 HFNC > intubation; CXR demonstrating L lung atelectasis / mucus plugging  - 03/31 bronchoscopy for mucus evacuation; RCx sent, mild improvement on CXR following  - Pulmonary hygiene, CPT, albuterol inhaler  - Cefepime for CAP coverage (recent hospitalization for pseudomonas coverage)  - Continuous pulse ox  - Monitor fever curve; given lack of infectious presentation, will defer abx course now  - Lactate wnl  - Monitor BCx, RCx  - Aspiration precaution

## 2022-03-31 NOTE — PT/OT/SLP PROGRESS
Occupational Therapy   Treatment w PT  Co-treat performed due to acuity and complexity of pt's medical status with 2 skilled disciplines needed to optimize pts functional performance in ICU setting.      Patient Name:  Carolina Baldwin   MRN:  [id number]  Admit Date: 3/28/2022  Admitting Diagnosis:  Liver injury   Length of Stay: 3 days  Recent Surgery: * No surgery found *      Recommendations:     Discharge Recommendations: home health OT  Discharge Equipment Recommendations:  3-in-1 commode, shower chair  Barriers to discharge:  Other (Comment) (impaired functional endurance)    Plan:     Patient to be seen 3 x/week to address the above listed problems via self-care/home management, therapeutic activities, therapeutic exercises  · Plan of Care Expires: 04/28/22  · Plan of Care Reviewed with: patient    Assessment:   Carolina Baldwin is a 36 y.o. female with a medical diagnosis of Liver injury.  She presents with the following performance deficits affecting function: weakness, impaired endurance, impaired self care skills, impaired functional mobilty, gait instability, pain, impaired cardiopulmonary response to activity.  Pt continues to benefit from a collaborative PT/OT/SLP program to improve quality of life and focus on recovery of impairments.     Pt intubated, but cleared for mobility per RN/MD team and pt passing MOVE screen. Pt tolerated today's session well. Progressing appropriately towards goals. Once patient is medically stable, patient is safe to discharge home with continued OT services via HHOT.       Rehab Prognosis: Good; patient would benefit from acute skilled OT services to address these deficits and reach maximum level of function.        Subjective   Communicated with: RN prior to session.  Patient found HOB elevated with bed alarm, blood pressure cuff, peripheral IV, ventilator, telemetry, PICC line, brown catheter upon OT entry to room.    Patient: nonverbal 2* ETT placement, able to nod  "appropriately and to write   "my throat hurts a lot"     Pain/Comfort:  · Pain Rating 1: other (see comments) (unrated pain to throat; RN present and aware, medication provided post-treatment)  · Location - Orientation 1: generalized  · Location 1: throat (throat 2* ETT placement)  · Pain Addressed 1: Reposition, Distraction  · Pain Rating Post-Intervention 1: 0/10    Objective:   Patient found with: bed alarm, blood pressure cuff, peripheral IV, ventilator, telemetry, PICC line, brown catheter     General Precautions: Standard, Cardiac fall   Orthopedic Precautions:N/A   Braces: N/A   Respiratory Status:    intubated, RN present for mobility  Vitals: /86   Pulse 109   Temp 98.5 °F (36.9 °C) (Oral)   Resp (!) 23   Ht 5' (1.524 m)   Wt 32 kg (70 lb 8.8 oz)   SpO2 100%   BMI 13.78 kg/m²     Outcome Measures:  AMPAC 6 Click ADL: 18    Cognition:   · Oriented X 4 and Alert  · Command following: easily distracted by pain and follows one-step commands  · Communication: nonverbal; able to nod appropriately and to write     Occupational Performance:  Bed Mobility:    · Patient completed Rolling/Turning to Left with  minimum assistance  · Patient completed Rolling/Turning to Right with minimum assistance  · Scooting to HOB in supine: maximal assistance and 2 persons  · Patient completed Supine to Sit with minimum assistance on L side of bed  · Scooting anteriorly to EOB to have both feet planted on floor: minimum assistance  · Patient completed Sit to Supine with minimum assistance on L side of bed    Functional Mobility/Transfers:   Static Sitting EOB: SBA completing seated ADLs  - Tolerated aprx 10 min EOB   Dynamic Sitting EOB: SBA   Patient completed Sit <> Stand Transfer with minimum assistance  with  hand-held assist, x2 trials    Static Standing Balance: min A x2 person HH assist   Dynamic Standing Balance: Min A x2 person HH assist     Activities of Daily Living:  · Feeding:   NPO; NG in " place  · Grooming: supervision seated EOB washnig face, BUE WFL  · Upper Body Dressing: minimum assistance donning fresh gown at bed level  · Lower Body Dressing: minimum assistance at bed level  · Toileting:  Perez in place; total A 2* diarrhea incontinence with standing;     AMPAC 6 Click ADL:  AMPAC Total Score: 18    Treatment & Education:  -OT POC, safety during ADLs and mobility   -Education on energy conservation and task modification to maximize safety and independence  -Questions answered within OT scope of practice.      Patient left HOB elevated with all lines intact, call button in reach and RN notified    GOALS:   Multidisciplinary Problems     Occupational Therapy Goals     Not on file                Time Tracking:     OT Date of Treatment: 03/31/22  OT Start Time: 1110  OT Stop Time: 1150  OT Total Time (min): 40 min  Additional staff present: PT      Billable Minutes:Self Care/Home Management 30  Therapeutic Activity 10      3/31/2022

## 2022-03-31 NOTE — SUBJECTIVE & OBJECTIVE
Interval History/Significant Events: AF HDS COURTNEY. Continues to be intubated on 30/5.    Review of Systems   Unable to perform ROS: Intubated   Objective:     Vital Signs (Most Recent):  Temp: 98.5 °F (36.9 °C) (03/31/22 0730)  Pulse: 109 (03/31/22 1245)  Resp: (!) 23 (03/31/22 1245)  BP: 122/86 (03/31/22 1230)  SpO2: 100 % (03/31/22 1245)   Vital Signs (24h Range):  Temp:  [95.7 °F (35.4 °C)-98.5 °F (36.9 °C)] 98.5 °F (36.9 °C)  Pulse:  [] 109  Resp:  [19-33] 23  SpO2:  [87 %-100 %] 100 %  BP: ()/(63-94) 122/86   Weight: 32 kg (70 lb 8.8 oz)  Body mass index is 13.78 kg/m².      Intake/Output Summary (Last 24 hours) at 3/31/2022 1514  Last data filed at 3/31/2022 1200  Gross per 24 hour   Intake 278.65 ml   Output 2075 ml   Net -1796.35 ml       Physical Exam  Constitutional:       General: She is not in acute distress.     Appearance: She is ill-appearing. She is not toxic-appearing or diaphoretic.      Comments: Cachetic, temporal wasting, jaundiced, weak, but in no apparent distress   HENT:      Head: Normocephalic.      Comments: Intubated     Nose: Nose normal.   Eyes:      General: Scleral icterus present.      Extraocular Movements: Extraocular movements intact.      Pupils: Pupils are equal, round, and reactive to light.   Cardiovascular:      Rate and Rhythm: Regular rhythm. Tachycardia present.      Pulses: Normal pulses.      Heart sounds: Normal heart sounds. No murmur heard.  Pulmonary:      Effort: Pulmonary effort is normal. No respiratory distress.      Breath sounds: Normal breath sounds. No stridor. No wheezing or rales.   Abdominal:      General: Abdomen is flat. There is distension.      Palpations: Abdomen is soft.      Tenderness: There is abdominal tenderness (diffuse tenderness, intermittently). There is no guarding or rebound.   Musculoskeletal:         General: No swelling or tenderness. Normal range of motion.      Cervical back: Normal range of motion. No rigidity or  tenderness.      Right lower leg: No edema.      Left lower leg: No edema.   Skin:     General: Skin is warm and dry.      Coloration: Skin is jaundiced.      Findings: Bruising and rash (petecchial rash on back) present.   Neurological:      General: No focal deficit present.      Mental Status: She is alert and oriented to person, place, and time. Mental status is at baseline.      Cranial Nerves: No cranial nerve deficit.      Sensory: No sensory deficit.      Motor: No weakness.      Comments: Slightly sleepy, very tired   Psychiatric:         Mood and Affect: Mood normal.         Behavior: Behavior normal.         Thought Content: Thought content normal.       Vents:  Vent Mode: A/C (03/31/22 1115)  Ventilator Initiated: Yes (03/30/22 0141)  Set Rate: 16 BPM (03/31/22 1115)  Vt Set: 350 mL (03/31/22 1115)  Pressure Support: 0 cmH20 (03/31/22 1115)  PEEP/CPAP: 5 cmH20 (03/31/22 1115)  Oxygen Concentration (%): 40 (03/31/22 1245)  Peak Airway Pressure: 22 cmH2O (03/31/22 1115)  Plateau Pressure: 0 cmH20 (03/31/22 1115)  Total Ve: 8.81 mL (03/31/22 1115)  Negative Inspiratory Force (cm H2O): 0 (03/30/22 1552)  F/VT Ratio<105 (RSBI): (!) 86.61 (03/31/22 1115)  Lines/Drains/Airways       Peripherally Inserted Central Catheter Line  Duration             PICC Triple Lumen left basilic -- days              Drain  Duration                  Urethral Catheter 03/28/22 1848 Latex 2 days              Airway  Duration                  Airway - Non-Surgical 03/30/22 0130 Endotracheal Tube 1 day       Airway Anesthesia 03/30/22 1 day                  Significant Labs:    CBC/Anemia Profile:  Recent Labs   Lab 03/30/22  0425 03/31/22  0251   WBC 15.32* 11.03   HGB 7.8* 7.7*   HCT 25.4* 24.5*    219   * 107*   RDW 27.1* 27.0*        Chemistries:  Recent Labs   Lab 03/30/22  0425 03/30/22  1813 03/31/22  0251    137 138   K 3.2* 3.7 3.4*   CL 99 98 97   CO2 30* 29 31*   BUN 14 11 13   CREATININE 0.4* 0.4* 0.4*    CALCIUM 8.6* 9.2 9.1   ALBUMIN 2.7* 2.6* 2.7*   PROT 5.5* 5.4* 5.5*   BILITOT 6.3* 7.9* 7.4*   ALKPHOS 869* 923* 982*   ALT 78* 82* 80*   AST 79* 89* 86*   MG 1.7  --  1.9   PHOS 4.3  --  4.1       All pertinent labs within the past 24 hours have been reviewed.    Significant Imaging:  I have reviewed all pertinent imaging results/findings within the past 24 hours.

## 2022-03-31 NOTE — PT/OT/SLP PROGRESS
Physical Therapy Treatment    Patient Name:  Carolina Baldwin   MRN:  [id number]  Admit Date: 3/28/2022  Admitting Diagnosis:  Liver injury  Length of Stay: 3 days  Recent Surgery: * No surgery found *      Recommendations:     Discharge Recommendations:  home health PT   Discharge Equipment Recommendations: 3-in-1 commode, shower chair   Barriers to discharge: None    Assessment:     Carolina Baldwin is a 36 y.o. female admitted with a medical diagnosis of Liver injury. Pt emergently intubated yesterday. Pt alert, following commands, and medically stable. Pt was able to sit EOB and take a few side steps with one person assistance. Pt was quick to fatigue cardiovascularly and musculoskeletally. HR in the 110s at rest and increased to the 120s with standing. Pt reported feeling like her legs where weak. Recommend HHPT once pt is medically stable for discharge.     RT present during session. ETT 23 at lips before and after session. No incidents occurred.       Problem List: weakness, impaired endurance, impaired functional mobilty, gait instability, pain, impaired cardiopulmonary response to activity  Rehab Prognosis: Good; patient would benefit from acute skilled PT services to address these deficits and reach maximum level of function.      Plan:     During this hospitalization, patient to be seen 3 x/week to address the identified rehab impairments via gait training, therapeutic activities, therapeutic exercises, neuromuscular re-education and progress towards the established goals.    · Plan of Care Expires:  04/28/22    Subjective   Communicated with RN prior to session.  Patient found HOB elevated upon PT entry to room, agreeable to evaluation. Carolina Baldwin's alone during session.    Chief Complaint: No chief complaint on file.    Patient/Family Comments/goals: to get better and return home   Pain/Comfort:  · Location 1:  (throat from ETT; unrated)  · Pain Addressed 1: Reposition, Distraction    Objective:    Patient found with: telemetry, pulse ox (continuous), blood pressure cuff, PICC line, brown catheter, ventilator     General Precautions: Standard, Cardiac fall   Orthopedic Precautions:N/A   Braces: N/A   Oxygen Device: Vent Settings: Vent Mode: A/C  Oxygen Concentration (%):  [30-40] 40  Resp Rate Total:  [18 br/min-29 br/min] 23 br/min  Vt Set:  [350 mL] 350 mL  PEEP/CPAP:  [5 cmH20] 5 cmH20  Pressure Support:  [0 cmH20-10 cmH20] 0 cmH20  Mean Airway Pressure:  [8.7 hjQ59-90 cmH20] 10 cmH20  Vitals: /83 (BP Location: Right arm, Patient Position: Lying)   Pulse (!) 116   Temp 98.5 °F (36.9 °C) (Oral)   Resp (!) 33   Ht 5' (1.524 m)   Wt 32 kg (70 lb 8.8 oz)   SpO2 100%   BMI 13.78 kg/m²       Outcome Measures:  AM-PAC 6 CLICK MOBILITY  Turning over in bed (including adjusting bedclothes, sheets and blankets)?: 3  Sitting down on and standing up from a chair with arms (e.g., wheelchair, bedside commode, etc.): 3  Moving from lying on back to sitting on the side of the bed?: 3  Moving to and from a bed to a chair (including a wheelchair)?: 3  Need to walk in hospital room?: 3  Climbing 3-5 steps with a railing?: 1  Basic Mobility Total Score: 16     Functional Mobility:  Additional staff present: OT and RT  Bed Mobility:  · Supine to Sit: minimum assistance; HOB elevated  · Scooting anteriorly to EOB to have both feet planted on floor: minimum assistance  · Sit to Supine: minimum assistance; HOB flat    Sitting Balance at Edge of Bed:   Assistance Level Required: Stand-by Assistance   Time: 8 minutes   Comments:   o Pt demo'd good posture. Pt reported dizziness upon sitting EOB but dizziness improved with time.   o Worked on activity tolerance sitting EOB, worked on tolerance to positional change, and worked on sitting balance     Transfers:   · Sit <> Stand Transfer: minimum assistance with no assistive device from EOB x 2 trials   · Pt quick to fatigue with prolonged standing. Pt reported he legs  felt weak      Pre-Gait:  · Standing x 20 seconds to increase B LE strength and to improve B LE endurance     Gait:   · Patient ambulated:  5 side steps to left   · Patient required: minimal assist  · Patient used: hand-held assist  · Gait Deviation(s): decreased step length, wide base of support and decreased conor  · Impairments due to: pain, decreased strength and decreased endurance  · Comments:   · External stability provided by HHA  · Verbal cuing for upright posture       Therapeutic Activities, Exercises, & Education:   Educated pt on PT role/POC  Educated pt on importance of OOB activity and daily ambulation   Pt verbalized understanding       Patient left HOB elevated with all lines intact, call button in reach and RN notified.    GOALS:   Multidisciplinary Problems     Physical Therapy Goals        Problem: Physical Therapy    Goal Priority Disciplines Outcome Goal Variances Interventions   Physical Therapy Goal     PT, PT/OT Ongoing, Progressing     Description: Goals to be met by: 22    Patient will increase functional independence with mobility by performin. Supine to sit with Stand-by Assistance  2. Sit to stand transfer with Contact Guard Assistance with RW  3. Gait  x 100 feet with Contact Guard Assistance using RW.   4. Ascend/descend 3 stair with bilateral Handrails Contact Guard Assistance   5. Lower extremity exercise program x15 reps with assistance as needed to increase tolerance to activities.                      History:     Past Medical History:   Diagnosis Date    IVDU (intravenous drug user)     Opioid use     Polysubstance abuse     Tobacco abuse disorder        Past Surgical History:   Procedure Laterality Date     SECTION      LAPAROSCOPIC CHOLECYSTECTOMY         Time Tracking:     PT Received On: 22  PT Start Time: 1110     PT Stop Time: 1150  PT Total Time (min): 40 min     Billable Minutes: Gait Training 8 and Therapeutic Activity 30

## 2022-04-01 LAB
ALBUMIN SERPL BCP-MCNC: 2.8 G/DL (ref 3.5–5.2)
ALBUMIN SERPL BCP-MCNC: 2.8 G/DL (ref 3.5–5.2)
ALP SERPL-CCNC: 1014 U/L (ref 55–135)
ALP SERPL-CCNC: 1037 U/L (ref 55–135)
ALT SERPL W/O P-5'-P-CCNC: 71 U/L (ref 10–44)
ALT SERPL W/O P-5'-P-CCNC: 74 U/L (ref 10–44)
ANION GAP SERPL CALC-SCNC: 11 MMOL/L (ref 8–16)
ANION GAP SERPL CALC-SCNC: 7 MMOL/L (ref 8–16)
ANISOCYTOSIS BLD QL SMEAR: SLIGHT
AST SERPL-CCNC: 77 U/L (ref 10–40)
AST SERPL-CCNC: 79 U/L (ref 10–40)
BASOPHILS # BLD AUTO: 0.03 K/UL (ref 0–0.2)
BASOPHILS NFR BLD: 0.3 % (ref 0–1.9)
BILIRUB SERPL-MCNC: 6 MG/DL (ref 0.1–1)
BILIRUB SERPL-MCNC: 6.5 MG/DL (ref 0.1–1)
BUN SERPL-MCNC: 12 MG/DL (ref 6–20)
BUN SERPL-MCNC: 12 MG/DL (ref 6–20)
CALCIUM SERPL-MCNC: 8.5 MG/DL (ref 8.7–10.5)
CALCIUM SERPL-MCNC: 9.3 MG/DL (ref 8.7–10.5)
CHLORIDE SERPL-SCNC: 100 MMOL/L (ref 95–110)
CHLORIDE SERPL-SCNC: 102 MMOL/L (ref 95–110)
CO2 SERPL-SCNC: 29 MMOL/L (ref 23–29)
CO2 SERPL-SCNC: 29 MMOL/L (ref 23–29)
CREAT SERPL-MCNC: 0.4 MG/DL (ref 0.5–1.4)
CREAT SERPL-MCNC: 0.5 MG/DL (ref 0.5–1.4)
DIFFERENTIAL METHOD: ABNORMAL
EOSINOPHIL # BLD AUTO: 0 K/UL (ref 0–0.5)
EOSINOPHIL NFR BLD: 0.2 % (ref 0–8)
ERYTHROCYTE [DISTWIDTH] IN BLOOD BY AUTOMATED COUNT: ABNORMAL % (ref 11.5–14.5)
EST. GFR  (AFRICAN AMERICAN): >60 ML/MIN/1.73 M^2
EST. GFR  (AFRICAN AMERICAN): >60 ML/MIN/1.73 M^2
EST. GFR  (NON AFRICAN AMERICAN): >60 ML/MIN/1.73 M^2
EST. GFR  (NON AFRICAN AMERICAN): >60 ML/MIN/1.73 M^2
GLUCOSE SERPL-MCNC: 103 MG/DL (ref 70–110)
GLUCOSE SERPL-MCNC: 152 MG/DL (ref 70–110)
HCT VFR BLD AUTO: 25.5 % (ref 37–48.5)
HGB BLD-MCNC: 7.9 G/DL (ref 12–16)
IMM GRANULOCYTES # BLD AUTO: 0.16 K/UL (ref 0–0.04)
IMM GRANULOCYTES NFR BLD AUTO: 1.5 % (ref 0–0.5)
INR PPP: 1.2 (ref 0.8–1.2)
LYMPHOCYTES # BLD AUTO: 1.1 K/UL (ref 1–4.8)
LYMPHOCYTES NFR BLD: 10.2 % (ref 18–48)
MAGNESIUM SERPL-MCNC: 2.2 MG/DL (ref 1.6–2.6)
MCH RBC QN AUTO: 34.2 PG (ref 27–31)
MCHC RBC AUTO-ENTMCNC: 31 G/DL (ref 32–36)
MCV RBC AUTO: 110 FL (ref 82–98)
MONOCYTES # BLD AUTO: 0.5 K/UL (ref 0.3–1)
MONOCYTES NFR BLD: 4.4 % (ref 4–15)
NEUTROPHILS # BLD AUTO: 9.1 K/UL (ref 1.8–7.7)
NEUTROPHILS NFR BLD: 83.4 % (ref 38–73)
NRBC BLD-RTO: 0 /100 WBC
PHOSPHATE SERPL-MCNC: 4.2 MG/DL (ref 2.7–4.5)
PLATELET # BLD AUTO: 296 K/UL (ref 150–450)
PLATELET BLD QL SMEAR: ABNORMAL
PMV BLD AUTO: 11.3 FL (ref 9.2–12.9)
POCT GLUCOSE: 108 MG/DL (ref 70–110)
POCT GLUCOSE: 114 MG/DL (ref 70–110)
POIKILOCYTOSIS BLD QL SMEAR: SLIGHT
POLYCHROMASIA BLD QL SMEAR: ABNORMAL
POTASSIUM SERPL-SCNC: 3.2 MMOL/L (ref 3.5–5.1)
POTASSIUM SERPL-SCNC: 3.7 MMOL/L (ref 3.5–5.1)
PROT SERPL-MCNC: 5.9 G/DL (ref 6–8.4)
PROT SERPL-MCNC: 5.9 G/DL (ref 6–8.4)
PROTHROMBIN TIME: 12.6 SEC (ref 9–12.5)
RBC # BLD AUTO: 2.31 M/UL (ref 4–5.4)
SODIUM SERPL-SCNC: 138 MMOL/L (ref 136–145)
SODIUM SERPL-SCNC: 140 MMOL/L (ref 136–145)
WBC # BLD AUTO: 10.95 K/UL (ref 3.9–12.7)

## 2022-04-01 PROCEDURE — 20000000 HC ICU ROOM

## 2022-04-01 PROCEDURE — 94761 N-INVAS EAR/PLS OXIMETRY MLT: CPT

## 2022-04-01 PROCEDURE — 83735 ASSAY OF MAGNESIUM: CPT

## 2022-04-01 PROCEDURE — 80053 COMPREHEN METABOLIC PANEL: CPT | Mod: 91

## 2022-04-01 PROCEDURE — 27200966 HC CLOSED SUCTION SYSTEM

## 2022-04-01 PROCEDURE — 99900035 HC TECH TIME PER 15 MIN (STAT)

## 2022-04-01 PROCEDURE — 63600175 PHARM REV CODE 636 W HCPCS

## 2022-04-01 PROCEDURE — 94003 VENT MGMT INPAT SUBQ DAY: CPT

## 2022-04-01 PROCEDURE — 63600175 PHARM REV CODE 636 W HCPCS: Performed by: STUDENT IN AN ORGANIZED HEALTH CARE EDUCATION/TRAINING PROGRAM

## 2022-04-01 PROCEDURE — 94640 AIRWAY INHALATION TREATMENT: CPT

## 2022-04-01 PROCEDURE — 99900026 HC AIRWAY MAINTENANCE (STAT)

## 2022-04-01 PROCEDURE — 85610 PROTHROMBIN TIME: CPT | Performed by: STUDENT IN AN ORGANIZED HEALTH CARE EDUCATION/TRAINING PROGRAM

## 2022-04-01 PROCEDURE — 85025 COMPLETE CBC W/AUTO DIFF WBC: CPT

## 2022-04-01 PROCEDURE — 27000221 HC OXYGEN, UP TO 24 HOURS

## 2022-04-01 PROCEDURE — 25000003 PHARM REV CODE 250: Performed by: STUDENT IN AN ORGANIZED HEALTH CARE EDUCATION/TRAINING PROGRAM

## 2022-04-01 PROCEDURE — 25000003 PHARM REV CODE 250: Performed by: NURSE PRACTITIONER

## 2022-04-01 PROCEDURE — 25000242 PHARM REV CODE 250 ALT 637 W/ HCPCS

## 2022-04-01 PROCEDURE — C1751 CATH, INF, PER/CENT/MIDLINE: HCPCS

## 2022-04-01 PROCEDURE — 99291 CRITICAL CARE FIRST HOUR: CPT | Mod: ,,, | Performed by: STUDENT IN AN ORGANIZED HEALTH CARE EDUCATION/TRAINING PROGRAM

## 2022-04-01 PROCEDURE — 25000003 PHARM REV CODE 250

## 2022-04-01 PROCEDURE — 84100 ASSAY OF PHOSPHORUS: CPT

## 2022-04-01 PROCEDURE — 99291 PR CRITICAL CARE, E/M 30-74 MINUTES: ICD-10-PCS | Mod: ,,, | Performed by: STUDENT IN AN ORGANIZED HEALTH CARE EDUCATION/TRAINING PROGRAM

## 2022-04-01 RX ORDER — OXYCODONE HYDROCHLORIDE 5 MG/1
5 TABLET ORAL EVERY 8 HOURS PRN
Status: DISCONTINUED | OUTPATIENT
Start: 2022-04-01 | End: 2022-04-05

## 2022-04-01 RX ADMIN — FUROSEMIDE 40 MG: 10 INJECTION, SOLUTION INTRAMUSCULAR; INTRAVENOUS at 08:04

## 2022-04-01 RX ADMIN — GUAIFENESIN 200 MG: 100 SOLUTION ORAL at 06:04

## 2022-04-01 RX ADMIN — HYDROXYZINE HYDROCHLORIDE 25 MG: 25 TABLET ORAL at 06:04

## 2022-04-01 RX ADMIN — SODIUM CHLORIDE SOLN NEBU 3% 4 ML: 3 NEBU SOLN at 01:04

## 2022-04-01 RX ADMIN — ENOXAPARIN SODIUM 30 MG: 30 INJECTION, SOLUTION INTRAVENOUS; SUBCUTANEOUS at 05:04

## 2022-04-01 RX ADMIN — SODIUM CHLORIDE SOLN NEBU 3% 4 ML: 3 NEBU SOLN at 12:04

## 2022-04-01 RX ADMIN — LORAZEPAM 0.5 MG: 0.5 TABLET ORAL at 12:04

## 2022-04-01 RX ADMIN — ALBUTEROL SULFATE 2.5 MG: 2.5 SOLUTION RESPIRATORY (INHALATION) at 07:04

## 2022-04-01 RX ADMIN — POTASSIUM BICARBONATE 50 MEQ: 978 TABLET, EFFERVESCENT ORAL at 08:04

## 2022-04-01 RX ADMIN — LACTULOSE 20 G: 20 SOLUTION ORAL at 08:04

## 2022-04-01 RX ADMIN — OXYCODONE 5 MG: 5 TABLET ORAL at 09:04

## 2022-04-01 RX ADMIN — LACTULOSE 20 G: 20 SOLUTION ORAL at 09:04

## 2022-04-01 RX ADMIN — ALBUTEROL SULFATE 2.5 MG: 2.5 SOLUTION RESPIRATORY (INHALATION) at 12:04

## 2022-04-01 RX ADMIN — CEFEPIME HYDROCHLORIDE 1 G: 1 INJECTION, SOLUTION INTRAVENOUS at 06:04

## 2022-04-01 RX ADMIN — GUAIFENESIN 200 MG: 100 SOLUTION ORAL at 02:04

## 2022-04-01 RX ADMIN — GUAIFENESIN 200 MG: 100 SOLUTION ORAL at 09:04

## 2022-04-01 RX ADMIN — RIFAXIMIN 550 MG: 550 TABLET ORAL at 08:04

## 2022-04-01 RX ADMIN — MUPIROCIN: 20 OINTMENT TOPICAL at 09:04

## 2022-04-01 RX ADMIN — SODIUM CHLORIDE SOLN NEBU 3% 4 ML: 3 NEBU SOLN at 07:04

## 2022-04-01 RX ADMIN — CEFEPIME HYDROCHLORIDE 1 G: 1 INJECTION, SOLUTION INTRAVENOUS at 02:04

## 2022-04-01 RX ADMIN — HYDROXYZINE HYDROCHLORIDE 25 MG: 25 TABLET ORAL at 03:04

## 2022-04-01 RX ADMIN — MUPIROCIN: 20 OINTMENT TOPICAL at 08:04

## 2022-04-01 RX ADMIN — CEFEPIME HYDROCHLORIDE 1 G: 1 INJECTION, SOLUTION INTRAVENOUS at 09:04

## 2022-04-01 RX ADMIN — LORAZEPAM 0.5 MG: 0.5 TABLET ORAL at 09:04

## 2022-04-01 RX ADMIN — RIFAXIMIN 550 MG: 550 TABLET ORAL at 09:04

## 2022-04-01 RX ADMIN — ALBUTEROL SULFATE 2.5 MG: 2.5 SOLUTION RESPIRATORY (INHALATION) at 01:04

## 2022-04-01 RX ADMIN — FAMOTIDINE 20 MG: 20 TABLET ORAL at 10:04

## 2022-04-01 RX ADMIN — FAMOTIDINE 20 MG: 20 TABLET ORAL at 08:04

## 2022-04-01 RX ADMIN — FUROSEMIDE 40 MG: 10 INJECTION, SOLUTION INTRAMUSCULAR; INTRAVENOUS at 09:04

## 2022-04-01 NOTE — ASSESSMENT & PLAN NOTE
Opioid abuse  Tobacco abuse  Methamphetamine abuse    Noted to be on suboxone through suboxone clinic. Unclear historical path.    - Addiction Psych consult for suboxone; per their report, patient is not on suboxone regularly and not interested at this time  - Urine toxicology negative at this time  - HIV negative  - Hep panel negative

## 2022-04-01 NOTE — ASSESSMENT & PLAN NOTE
Bilateral pleural effusions  Pleural consolidation, L  Atelectasis  Mucus Plugging    No baseline home O2 per patient. Presenting with 5 L NC O2. Unclear etiology, though previous documentation suggestive of pleural effusion vs component of PNA. Unclear etiology as patient seems to be volume down on exam. No reported history of heart failure or COPD (though extensive tobacco use history). Etiology may be 2/2 to abdominal distention causing diaphragmatic pressure vs infectious etiology vs pleural effusion d/t third spacing vs infection? CT demonstrating bilateral pleural effusions and lingula consolidation; concerns for possible PNA    - 03/29 patient decompensated while on 5L > 10 HFNC > intubation; CXR demonstrating L lung atelectasis / mucus plugging  - 03/31 bronchoscopy for mucus evacuation; RCx sent, mild improvement on CXR following  - RCx few GNR on gram stain. Finish cefepime day 5 on 4/2  - Extubated to NC on 4/1  - Pulmonary hygiene, CPT, albuterol inhaler  - Cefepime for CAP coverage (recent hospitalization for pseudomonas coverage)  - Continuous pulse ox  - Monitor fever curve; given lack of infectious presentation, will defer abx course now  - Lactate wnl  - Monitor BCx, RCx  - Aspiration precaution

## 2022-04-01 NOTE — ASSESSMENT & PLAN NOTE
Acute hepatic metabolic encephalopathy  Elevated transaminases  Elevated total bilirubin    35 y/o F with PMHx IVDU, polysubstance abuse presenting to [facility] as transfer from [facility] for hepatology evaluation. Initially presented with concerns for acute liver failure (encephalopathy, elevated elevated elevation of LFTs, jaundice) vs pSBO vs sepsis 2/2 abdominal infection. NGT decompression of pSBO with good response, advanced to [facility]D. Started on broad spectrum abx and tapered. Started on lactulose and rifaximin with good response of encephalopathy, now A&O x 4 without signs of asterixis, tongue fasciculations, hallucinations. Transferred to [facility] for further hepatological evaluation.    - Hepatology consulted and following; appreciate assistance  - Acute hepatitis panel negative  - HIV negative  - US liver doppler / abdomen did not demonstrate appreciable biliary duct dilatation  - Lactulose 20 BID (titrate to 4-5 BMs per day)  - Rifaximin  - Ceruloplasmin, Copper pending   - ELIN, Anti-Sm negative  - PT/INR daily  - Trend CMP daily  - Trend CBC daily  - Monitor neurological status  - Ascites studies unremarkable for SBP

## 2022-04-01 NOTE — ASSESSMENT & PLAN NOTE
H/o pSBO  H/o laparoscopic cholecystectomy    Patient with recent h/o pSBO at OSH,with good response to NGT decompression. Advanced to CLD. Initially complaining of RUQ abdominal pain at OSH on initial presentation. Now with mild, diffuse abdominal pain on palpation, but none at rest. Abdomen remains distended. Hypoactive bowel sounds.    - Repeat KUB / CTAP demonstrating significant bowel dilatation aw/ air-fluid levels c/w SBO  - Appreciate general surgery. NGT to [facility]. Suction d/c on 4/1 after extubation and feeding began  - Monitor BM, diet tolerance (currently with both BM and diet tolerance)  - FLD, ADAT  - General surgery evaluated, no role for surgical intervention  - Bedside US abdomen without note for ascites or other process  - Serial abdominal exams

## 2022-04-01 NOTE — PROGRESS NOTES
[facility]  Critical Care Medicine  Progress Note    Patient Name: Carolina Baldwin  MRN: [id number]  Admission Date: 3/28/2022  Hospital Length of Stay: 4 days  Code Status: Full Code  Attending Provider: Pawel Velez MD  Primary Care Provider: Primary Doctor No   Principal Problem: Liver injury    Subjective:     HPI:  35 y/o F with PMHx IVDU on suboxone, polysubstance use (meth, opioids, tobacco), gastric ulcers initially presented to OSH for increasing jaundice, lethargy, AMS, evaluation for pSBO, increased O2 requirement (3-4 L, no baseline home O2). Was started on broad spectrum Abx at OSH and eventually discontinued. Initially presented oriented x 2 and severe jaundice, elevated ammonia, but normal LFTs? Started on lactulose and rifaximin. NGT decompression at OSH for SBO with good response, advanced to CLD. Transferred to [facility] for hepatology evaluation.     On presentation to [facility] MICU, patient is overtly jaundiced but with intact mentation (a&o x4). Complains of lower back pain that she experienced from the long transport from OSH to [facility]. Also complains of increasing hunger and thirst (good appetite). Questionable historian, but understands the circumstance, stating she knows she is having new liver problems and is here for the hepatology team to evaluate her. Patient reports having good bowel movements and appropriate urinary voids. Specifically denies chest pain, increased work of breathing, increasing/worsening abdominal pain, fever/chills, nausea, vomiting, constipation, diarrhea, dysuria, suprapubic pain, recent IVDU (currently on suboxone), hallucinations (auditory and visual), tremors, seizures, loss of consciousness, headache, sensory/strength changes, abnormal bleeding.    Patient is from [place], [place]. Lives with boyfriend and 5 children. Of note, she calls her boyfriend her  but is not legally ; her closest relative is her sister. Substantial IVDU history (meth),  non-injectable opioid abuse. Recently stopped smoking history (smoked since 10 y/o). On suboxone per OSH report and per patient. Does not know if any active HIV, hepatitis infection. Family history of CAD, ACS, and cancer (unspecified).       Hospital/ICU Course:  Transferred from OSH to [facility] MICU for HLOC, hepatology evaluation, and SBO. On arrival, patient A&O x 4 with diffuse abdominal pain w/o rebound, on 5 L NC. CTAP demonstrated diffuse bowel air levels and dilatation c/w SBO. Bilateral lung effusions present with concerns for L lingula consolidation. AF VS HDS on presentation to [facility]. Bedside US without amenable ascites pocket for paracentesis. Gen Surg evaluated w/o need for surgical intervention. Started on CAP coverage for pulmonary findings. Lactulose titrated to 4-5 BM/day, rifaximin. Hepatology consulted and work up initiated. Patient desaturated on 5 L NC and subsequently stepped up to 10 L HFNC with eventual intubation. Found to be mucus plugging per CXR (L lung atelectasis with L midline shift). Levophed started during intubation, weaned off. Patient respiratory status improving. Bronch completed for mucus evacuation on 03/31. Extubated to NC on 4/1. Suction turned off to NGT per gen surg recs. Tolerating diet, advancing as tolerated.      Interval History/Significant Events: Extubated to NC. Suction turned off to NGT per gen surg recs. Tolerating diet, advancing as tolerated.    Review of Systems   Unable to perform ROS: Intubated   Objective:     Vital Signs (Most Recent):  Temp: 98.9 °F (37.2 °C) (04/01/22 1300)  Pulse: (!) 120 (04/01/22 1303)  Resp: (!) 49 (04/01/22 1303)  BP: (!) 138/91 (04/01/22 1303)  SpO2: (!) 94 % (04/01/22 1303)   Vital Signs (24h Range):  Temp:  [96.7 °F (35.9 °C)-99 °F (37.2 °C)] 98.9 °F (37.2 °C)  Pulse:  [] 120  Resp:  [20-49] 49  SpO2:  [89 %-100 %] 94 %  BP: (115-141)/(78-94) 138/91   Weight: 32 kg (70 lb 8.8 oz)  Body mass index is 13.78 kg/m².      Intake/Output  Summary (Last 24 hours) at 4/1/2022 1411  Last data filed at 4/1/2022 1300  Gross per 24 hour   Intake 0 ml   Output 2500 ml   Net -2500 ml         Physical Exam  Constitutional:       General: She is not in acute distress.     Appearance: She is ill-appearing. She is not toxic-appearing or diaphoretic.      Comments: Cachetic, temporal wasting, jaundiced, weak, but in no apparent distress   HENT:      Head: Normocephalic.      Comments: Intubated     Nose: Nose normal.   Eyes:      General: Scleral icterus present.      Extraocular Movements: Extraocular movements intact.      Pupils: Pupils are equal, round, and reactive to light.   Cardiovascular:      Rate and Rhythm: Regular rhythm. Tachycardia present.      Pulses: Normal pulses.      Heart sounds: Normal heart sounds. No murmur heard.  Pulmonary:      Effort: Pulmonary effort is normal. No respiratory distress.      Breath sounds: Normal breath sounds. No stridor. No wheezing or rales.   Abdominal:      General: Abdomen is flat. There is distension.      Palpations: Abdomen is soft.      Tenderness: There is abdominal tenderness (diffuse tenderness, intermittently). There is no guarding or rebound.   Musculoskeletal:         General: No swelling or tenderness. Normal range of motion.      Cervical back: Normal range of motion. No rigidity or tenderness.      Right lower leg: No edema.      Left lower leg: No edema.   Skin:     General: Skin is warm and dry.      Coloration: Skin is jaundiced.      Findings: Bruising and rash (petecchial rash on back) present.   Neurological:      General: No focal deficit present.      Mental Status: She is alert and oriented to person, place, and time. Mental status is at baseline.      Cranial Nerves: No cranial nerve deficit.      Sensory: No sensory deficit.      Motor: No weakness.      Comments: Slightly sleepy, very tired   Psychiatric:         Mood and Affect: Mood normal.         Behavior: Behavior normal.          Thought Content: Thought content normal.       Vents:  Vent Mode: A/C (04/01/22 0705)  Ventilator Initiated: Yes (03/30/22 0141)  Set Rate: 16 BPM (04/01/22 0705)  Vt Set: 350 mL (04/01/22 0705)  Pressure Support: 0 cmH20 (04/01/22 0705)  PEEP/CPAP: 5 cmH20 (04/01/22 0705)  Oxygen Concentration (%): 30 (04/01/22 1000)  Peak Airway Pressure: 20 cmH2O (04/01/22 0705)  Plateau Pressure: 0 cmH20 (04/01/22 0705)  Total Ve: 9.02 mL (04/01/22 0705)  Negative Inspiratory Force (cm H2O): 0 (03/30/22 1552)  F/VT Ratio<105 (RSBI): (!) 65.45 (04/01/22 0705)  Lines/Drains/Airways       Peripherally Inserted Central Catheter Line  Duration             PICC Triple Lumen left basilic -- days              Drain  Duration                  Urethral Catheter 03/28/22 1848 Latex 3 days              Airway  Duration                  Airway - Non-Surgical 03/30/22 0130 Endotracheal Tube 2 days       Airway Anesthesia 03/30/22 2 days                  Significant Labs:    CBC/Anemia Profile:  Recent Labs   Lab 03/31/22  0251 04/01/22  0307   WBC 11.03 10.95   HGB 7.7* 7.9*   HCT 24.5* 25.5*    296   * 110*   RDW 27.0* SEE COMMENT          Chemistries:  Recent Labs   Lab 03/31/22  0251 03/31/22  1603 04/01/22  0307    134* 140   K 3.4* 4.3 3.7   CL 97 97 100   CO2 31* 31* 29   BUN 13 11 12   CREATININE 0.4* 0.4* 0.4*   CALCIUM 9.1 8.7 9.3   ALBUMIN 2.7* 2.8* 2.8*   PROT 5.5* 5.8* 5.9*   BILITOT 7.4* 7.2* 6.5*   ALKPHOS 982* 1,037* 1,037*   ALT 80* 80* 74*   AST 86* 85* 77*   MG 1.9  --  2.2   PHOS 4.1  --  4.2         All pertinent labs within the past 24 hours have been reviewed.    Significant Imaging:  I have reviewed all pertinent imaging results/findings within the past 24 hours.      ABG  No results for input(s): PH, PO2, PCO2, HCO3, BE in the last 168 hours.  Assessment/Plan:     Psychiatric  Polysubstance abuse  Opioid abuse  Tobacco abuse  Methamphetamine abuse    Noted to be on suboxone through suboxone clinic.  Unclear historical path.    - Addiction Psych consult for suboxone; per their report, patient is not on suboxone regularly and not interested at this time  - Urine toxicology negative at this time  - HIV negative  - Hep panel negative    Pulmonary  Acute hypoxemic respiratory failure  Bilateral pleural effusions  Pleural consolidation, L  Atelectasis  Mucus Plugging    No baseline home O2 per patient. Presenting with 5 L NC O2. Unclear etiology, though previous documentation suggestive of pleural effusion vs component of PNA. Unclear etiology as patient seems to be volume down on exam. No reported history of heart failure or COPD (though extensive tobacco use history). Etiology may be 2/2 to abdominal distention causing diaphragmatic pressure vs infectious etiology vs pleural effusion d/t third spacing vs infection? CT demonstrating bilateral pleural effusions and lingula consolidation; concerns for possible PNA    - 03/29 patient decompensated while on 5L > 10 HFNC > intubation; CXR demonstrating L lung atelectasis / mucus plugging  - 03/31 bronchoscopy for mucus evacuation; RCx sent, mild improvement on CXR following  - RCx few GNR on gram stain. Finish cefepime day 5 on 4/2  - Extubated to NC on 4/1  - Pulmonary hygiene, CPT, albuterol inhaler  - Cefepime for CAP coverage (recent hospitalization for pseudomonas coverage)  - Continuous pulse ox  - Monitor fever curve; given lack of infectious presentation, will defer abx course now  - Lactate wnl  - Monitor BCx, RCx  - Aspiration precaution    Cardiac/Vascular  Paroxysmal tachycardia  Presenting with tachycardia to 120s. Noted in OSH previous progress note and was tempered with BB. Unclear etiology, potentially volume down driving sinus tachycardia as patient has been on NGT suction d/t pSBO with limited PO intake. Also appears to be extremely malnourished. In setting of potential acute pulmonary process, may be related to acute infection vs mucus plugging /  atelectasis    - Repeat EKG demonstrating sinus tachycardia and abnormal R wave progression; no previous EKG for comparison  - Cardiac telemetry  - Electrolyte trend, K > 4, Mg > 2  - Will hold trial of IVF bolus until further evaluation regarding volume status (BNP elevated 1000+ on admission)  - TTE revealing EF 35-40%, PAP 34,       GI  Generalized abdominal pain  H/o pSBO  H/o laparoscopic cholecystectomy    Patient with recent h/o pSBO at OSH,with good response to NGT decompression. Advanced to CLD. Initially complaining of RUQ abdominal pain at OSH on initial presentation. Now with mild, diffuse abdominal pain on palpation, but none at rest. Abdomen remains distended. Hypoactive bowel sounds.    - Repeat KUB / CTAP demonstrating significant bowel dilatation aw/ air-fluid levels c/w SBO  - Appreciate general surgery. NGT to LWS. Suction d/c on 4/1 after extubation and feeding began  - Monitor BM, diet tolerance (currently with both BM and diet tolerance)  - FLD, ADAT  - General surgery evaluated, no role for surgical intervention  - Bedside US abdomen without note for ascites or other process  - Serial abdominal exams    Orthopedic  * Liver injury  Acute hepatic metabolic encephalopathy  Elevated transaminases  Elevated total bilirubin    35 y/o F with PMHx IVDU, polysubstance abuse presenting to [facility] as transfer from [facility] for hepatology evaluation. Initially presented with concerns for acute liver failure (encephalopathy, elevated elevated elevation of LFTs, jaundice) vs pSBO vs sepsis 2/2 abdominal infection. NGT decompression of pSBO with good response, advanced to CLD. Started on broad spectrum abx and tapered. Started on lactulose and rifaximin with good response of encephalopathy, now A&O x 4 without signs of asterixis, tongue fasciculations, hallucinations. Transferred to [facility] for further hepatological evaluation.    - Hepatology consulted and following; appreciate assistance  - Acute  hepatitis panel negative  - HIV negative  - US liver doppler / abdomen did not demonstrate appreciable biliary duct dilatation  - Lactulose 20 BID (titrate to 4-5 BMs per day)  - Rifaximin  - Ceruloplasmin, Copper pending   - ELIN, Anti-Sm negative  - PT/INR daily  - Trend CMP daily  - Trend CBC daily  - Monitor neurological status  - Ascites studies unremarkable for SBP    Other  Failure to thrive in adult  Temporal wasting  Malnutrition    - Boost when tolerated  - Nutrition consult       Critical Care Daily Checklist:    A: Awake: RASS Goal/Actual Goal:    Actual: Wan Agitation Sedation Scale (RASS): Alert and calm   B: Spontaneous Breathing Trial Performed?     C: SAT & SBT Coordinated?  yes                      D: Delirium: CAM-ICU Overall CAM-ICU: Negative   E: Early Mobility Performed? Yes   F: Feeding Goal: Goals: Meet % EEN, EPN by RD f/u date  Status: Nutrition Goal Status: new   Current Diet Order   Procedures    Diet full liquid      AS: Analgesia/Sedation Fentanyl transition to oxycodone   T: Thromboembolic Prophylaxis yes   H: HOB > 300 Yes   U: Stress Ulcer Prophylaxis (if needed)    G: Glucose Control    B: Bowel Function Stool Occurrence: 1   I: Indwelling Catheter (Lines & Brown) Necessity D/c brown   D: De-escalation of Antimicrobials/Pharmacotherapies     Plan for the day/ETD Extubate, feed    Code Status:  Family/Goals of Care: Full Code         Critical secondary to Patient has a condition that poses threat to life and bodily function: respiratory failure     Critical care was time spent personally by me on the following activities: development of treatment plan with patient or surrogate and bedside caregivers, discussions with consultants, evaluation of patient's response to treatment, examination of patient, ordering and performing treatments and interventions, ordering and review of laboratory studies, ordering and review of radiographic studies, pulse oximetry, re-evaluation of  patient's condition. This critical care time did not overlap with that of any other provider or involve time for any procedures.     Arlen Sultana DO  Critical Care Medicine  [facility] Card[facility] Medical [facility]

## 2022-04-01 NOTE — PLAN OF CARE
"Patient remains on ventilator at 30% FiO2, SpO2 >95% throughout shift. Alert, following commands, able to write requests with pen and paper. Able to roll herself with minimal assist. Sinus tachycardia, normotensive to slight HTN. Afebrile. Skin intact without breakdown. 4 BMs overnight. Perez remains in place, 1500mL for shift. Patient needs reinforcement regarding plan of care, states other nurses have been "giving her food and water". Very frequent requests for IV pain medication and anxiety medication for throat discomfort.   "

## 2022-04-01 NOTE — ASSESSMENT & PLAN NOTE
Presenting with tachycardia to 120s. Noted in OSH previous progress note and was tempered with BB. Unclear etiology, potentially volume down driving sinus tachycardia as patient has been on NGT suction d/t pSBO with limited PO intake. Also appears to be extremely malnourished. In setting of potential acute pulmonary process, may be related to acute infection vs mucus plugging / atelectasis    - Repeat EKG demonstrating sinus tachycardia and abnormal R wave progression; no previous EKG for comparison  - Cardiac telemetry  - Electrolyte trend, K > 4, Mg > 2  - Will hold trial of IVF bolus until further evaluation regarding volume status (BNP elevated 1000+ on admission)  - TTE revealing EF 35-40%, PAP 34,

## 2022-04-01 NOTE — SUBJECTIVE & OBJECTIVE
Interval History/Significant Events: Extubated to NC. Suction turned off to NGT per gen surg recs. Tolerating diet, advancing as tolerated.    Review of Systems   Unable to perform ROS: Intubated   Objective:     Vital Signs (Most Recent):  Temp: 98.9 °F (37.2 °C) (04/01/22 1300)  Pulse: (!) 120 (04/01/22 1303)  Resp: (!) 49 (04/01/22 1303)  BP: (!) 138/91 (04/01/22 1303)  SpO2: (!) 94 % (04/01/22 1303)   Vital Signs (24h Range):  Temp:  [96.7 °F (35.9 °C)-99 °F (37.2 °C)] 98.9 °F (37.2 °C)  Pulse:  [] 120  Resp:  [20-49] 49  SpO2:  [89 %-100 %] 94 %  BP: (115-141)/(78-94) 138/91   Weight: 32 kg (70 lb 8.8 oz)  Body mass index is 13.78 kg/m².      Intake/Output Summary (Last 24 hours) at 4/1/2022 1411  Last data filed at 4/1/2022 1300  Gross per 24 hour   Intake 0 ml   Output 2500 ml   Net -2500 ml         Physical Exam  Constitutional:       General: She is not in acute distress.     Appearance: She is ill-appearing. She is not toxic-appearing or diaphoretic.      Comments: Cachetic, temporal wasting, jaundiced, weak, but in no apparent distress   HENT:      Head: Normocephalic.      Comments: Intubated     Nose: Nose normal.   Eyes:      General: Scleral icterus present.      Extraocular Movements: Extraocular movements intact.      Pupils: Pupils are equal, round, and reactive to light.   Cardiovascular:      Rate and Rhythm: Regular rhythm. Tachycardia present.      Pulses: Normal pulses.      Heart sounds: Normal heart sounds. No murmur heard.  Pulmonary:      Effort: Pulmonary effort is normal. No respiratory distress.      Breath sounds: Normal breath sounds. No stridor. No wheezing or rales.   Abdominal:      General: Abdomen is flat. There is distension.      Palpations: Abdomen is soft.      Tenderness: There is abdominal tenderness (diffuse tenderness, intermittently). There is no guarding or rebound.   Musculoskeletal:         General: No swelling or tenderness. Normal range of motion.       Cervical back: Normal range of motion. No rigidity or tenderness.      Right lower leg: No edema.      Left lower leg: No edema.   Skin:     General: Skin is warm and dry.      Coloration: Skin is jaundiced.      Findings: Bruising and rash (petecchial rash on back) present.   Neurological:      General: No focal deficit present.      Mental Status: She is alert and oriented to person, place, and time. Mental status is at baseline.      Cranial Nerves: No cranial nerve deficit.      Sensory: No sensory deficit.      Motor: No weakness.      Comments: Slightly sleepy, very tired   Psychiatric:         Mood and Affect: Mood normal.         Behavior: Behavior normal.         Thought Content: Thought content normal.       Vents:  Vent Mode: A/C (04/01/22 0705)  Ventilator Initiated: Yes (03/30/22 0141)  Set Rate: 16 BPM (04/01/22 0705)  Vt Set: 350 mL (04/01/22 0705)  Pressure Support: 0 cmH20 (04/01/22 0705)  PEEP/CPAP: 5 cmH20 (04/01/22 0705)  Oxygen Concentration (%): 30 (04/01/22 1000)  Peak Airway Pressure: 20 cmH2O (04/01/22 0705)  Plateau Pressure: 0 cmH20 (04/01/22 0705)  Total Ve: 9.02 mL (04/01/22 0705)  Negative Inspiratory Force (cm H2O): 0 (03/30/22 1552)  F/VT Ratio<105 (RSBI): (!) 65.45 (04/01/22 0705)  Lines/Drains/Airways       Peripherally Inserted Central Catheter Line  Duration             PICC Triple Lumen left basilic -- days              Drain  Duration                  Urethral Catheter 03/28/22 1848 Latex 3 days              Airway  Duration                  Airway - Non-Surgical 03/30/22 0130 Endotracheal Tube 2 days       Airway Anesthesia 03/30/22 2 days                  Significant Labs:    CBC/Anemia Profile:  Recent Labs   Lab 03/31/22  0251 04/01/22  0307   WBC 11.03 10.95   HGB 7.7* 7.9*   HCT 24.5* 25.5*    296   * 110*   RDW 27.0* SEE COMMENT          Chemistries:  Recent Labs   Lab 03/31/22  0251 03/31/22  1603 04/01/22  0307    134* 140   K 3.4* 4.3 3.7   CL 97 97  100   CO2 31* 31* 29   BUN 13 11 12   CREATININE 0.4* 0.4* 0.4*   CALCIUM 9.1 8.7 9.3   ALBUMIN 2.7* 2.8* 2.8*   PROT 5.5* 5.8* 5.9*   BILITOT 7.4* 7.2* 6.5*   ALKPHOS 982* 1,037* 1,037*   ALT 80* 80* 74*   AST 86* 85* 77*   MG 1.9  --  2.2   PHOS 4.1  --  4.2         All pertinent labs within the past 24 hours have been reviewed.    Significant Imaging:  I have reviewed all pertinent imaging results/findings within the past 24 hours.

## 2022-04-02 LAB
ALBUMIN SERPL BCP-MCNC: 3 G/DL (ref 3.5–5.2)
ALBUMIN SERPL BCP-MCNC: 3.3 G/DL (ref 3.5–5.2)
ALP SERPL-CCNC: 1125 U/L (ref 55–135)
ALP SERPL-CCNC: 1279 U/L (ref 55–135)
ALT SERPL W/O P-5'-P-CCNC: 74 U/L (ref 10–44)
ALT SERPL W/O P-5'-P-CCNC: 83 U/L (ref 10–44)
ANION GAP SERPL CALC-SCNC: 12 MMOL/L (ref 8–16)
ANION GAP SERPL CALC-SCNC: 14 MMOL/L (ref 8–16)
AST SERPL-CCNC: 82 U/L (ref 10–40)
AST SERPL-CCNC: 94 U/L (ref 10–40)
BASOPHILS # BLD AUTO: 0.03 K/UL (ref 0–0.2)
BASOPHILS NFR BLD: 0.3 % (ref 0–1.9)
BILIRUB SERPL-MCNC: 6.3 MG/DL (ref 0.1–1)
BILIRUB SERPL-MCNC: 6.5 MG/DL (ref 0.1–1)
BUN SERPL-MCNC: 12 MG/DL (ref 6–20)
BUN SERPL-MCNC: 13 MG/DL (ref 6–20)
CALCIUM SERPL-MCNC: 8.9 MG/DL (ref 8.7–10.5)
CALCIUM SERPL-MCNC: 9.4 MG/DL (ref 8.7–10.5)
CHLORIDE SERPL-SCNC: 94 MMOL/L (ref 95–110)
CHLORIDE SERPL-SCNC: 98 MMOL/L (ref 95–110)
CO2 SERPL-SCNC: 30 MMOL/L (ref 23–29)
CO2 SERPL-SCNC: 31 MMOL/L (ref 23–29)
CREAT SERPL-MCNC: 0.4 MG/DL (ref 0.5–1.4)
CREAT SERPL-MCNC: 0.5 MG/DL (ref 0.5–1.4)
DIFFERENTIAL METHOD: ABNORMAL
EOSINOPHIL # BLD AUTO: 0 K/UL (ref 0–0.5)
EOSINOPHIL NFR BLD: 0.2 % (ref 0–8)
ERYTHROCYTE [DISTWIDTH] IN BLOOD BY AUTOMATED COUNT: 24.7 % (ref 11.5–14.5)
EST. GFR  (AFRICAN AMERICAN): >60 ML/MIN/1.73 M^2
EST. GFR  (AFRICAN AMERICAN): >60 ML/MIN/1.73 M^2
EST. GFR  (NON AFRICAN AMERICAN): >60 ML/MIN/1.73 M^2
EST. GFR  (NON AFRICAN AMERICAN): >60 ML/MIN/1.73 M^2
GLUCOSE SERPL-MCNC: 112 MG/DL (ref 70–110)
GLUCOSE SERPL-MCNC: 96 MG/DL (ref 70–110)
HCT VFR BLD AUTO: 26.9 % (ref 37–48.5)
HGB BLD-MCNC: 8.4 G/DL (ref 12–16)
IMM GRANULOCYTES # BLD AUTO: 0.15 K/UL (ref 0–0.04)
IMM GRANULOCYTES NFR BLD AUTO: 1.5 % (ref 0–0.5)
INR PPP: 1.1 (ref 0.8–1.2)
LYMPHOCYTES # BLD AUTO: 1.2 K/UL (ref 1–4.8)
LYMPHOCYTES NFR BLD: 11.8 % (ref 18–48)
MAGNESIUM SERPL-MCNC: 1.9 MG/DL (ref 1.6–2.6)
MCH RBC QN AUTO: 34.3 PG (ref 27–31)
MCHC RBC AUTO-ENTMCNC: 31.2 G/DL (ref 32–36)
MCV RBC AUTO: 110 FL (ref 82–98)
MONOCYTES # BLD AUTO: 0.5 K/UL (ref 0.3–1)
MONOCYTES NFR BLD: 5 % (ref 4–15)
NEUTROPHILS # BLD AUTO: 8.3 K/UL (ref 1.8–7.7)
NEUTROPHILS NFR BLD: 81.2 % (ref 38–73)
NRBC BLD-RTO: 0 /100 WBC
PHOSPHATE SERPL-MCNC: 3.2 MG/DL (ref 2.7–4.5)
PLATELET # BLD AUTO: 346 K/UL (ref 150–450)
PMV BLD AUTO: 11 FL (ref 9.2–12.9)
POTASSIUM SERPL-SCNC: 3.6 MMOL/L (ref 3.5–5.1)
POTASSIUM SERPL-SCNC: 4 MMOL/L (ref 3.5–5.1)
PROT SERPL-MCNC: 6.3 G/DL (ref 6–8.4)
PROT SERPL-MCNC: 6.9 G/DL (ref 6–8.4)
PROTHROMBIN TIME: 11.8 SEC (ref 9–12.5)
RBC # BLD AUTO: 2.45 M/UL (ref 4–5.4)
SODIUM SERPL-SCNC: 138 MMOL/L (ref 136–145)
SODIUM SERPL-SCNC: 141 MMOL/L (ref 136–145)
WBC # BLD AUTO: 10.24 K/UL (ref 3.9–12.7)

## 2022-04-02 PROCEDURE — 25000003 PHARM REV CODE 250: Performed by: STUDENT IN AN ORGANIZED HEALTH CARE EDUCATION/TRAINING PROGRAM

## 2022-04-02 PROCEDURE — 94660 CPAP INITIATION&MGMT: CPT

## 2022-04-02 PROCEDURE — 85025 COMPLETE CBC W/AUTO DIFF WBC: CPT

## 2022-04-02 PROCEDURE — 99291 CRITICAL CARE FIRST HOUR: CPT | Mod: ,,, | Performed by: INTERNAL MEDICINE

## 2022-04-02 PROCEDURE — 92610 EVALUATE SWALLOWING FUNCTION: CPT

## 2022-04-02 PROCEDURE — S4991 NICOTINE PATCH NONLEGEND: HCPCS

## 2022-04-02 PROCEDURE — 27100171 HC OXYGEN HIGH FLOW UP TO 24 HOURS

## 2022-04-02 PROCEDURE — 83735 ASSAY OF MAGNESIUM: CPT

## 2022-04-02 PROCEDURE — 94640 AIRWAY INHALATION TREATMENT: CPT

## 2022-04-02 PROCEDURE — 63600175 PHARM REV CODE 636 W HCPCS: Performed by: STUDENT IN AN ORGANIZED HEALTH CARE EDUCATION/TRAINING PROGRAM

## 2022-04-02 PROCEDURE — 25000242 PHARM REV CODE 250 ALT 637 W/ HCPCS

## 2022-04-02 PROCEDURE — 63600175 PHARM REV CODE 636 W HCPCS

## 2022-04-02 PROCEDURE — 27000190 HC CPAP FULL FACE MASK W/VALVE

## 2022-04-02 PROCEDURE — 27200966 HC CLOSED SUCTION SYSTEM

## 2022-04-02 PROCEDURE — 99291 PR CRITICAL CARE, E/M 30-74 MINUTES: ICD-10-PCS | Mod: ,,, | Performed by: INTERNAL MEDICINE

## 2022-04-02 PROCEDURE — 84100 ASSAY OF PHOSPHORUS: CPT

## 2022-04-02 PROCEDURE — 94761 N-INVAS EAR/PLS OXIMETRY MLT: CPT

## 2022-04-02 PROCEDURE — 80053 COMPREHEN METABOLIC PANEL: CPT

## 2022-04-02 PROCEDURE — 25000003 PHARM REV CODE 250

## 2022-04-02 PROCEDURE — 27000221 HC OXYGEN, UP TO 24 HOURS

## 2022-04-02 PROCEDURE — 97535 SELF CARE MNGMENT TRAINING: CPT

## 2022-04-02 PROCEDURE — 20000000 HC ICU ROOM

## 2022-04-02 PROCEDURE — 85610 PROTHROMBIN TIME: CPT | Performed by: STUDENT IN AN ORGANIZED HEALTH CARE EDUCATION/TRAINING PROGRAM

## 2022-04-02 PROCEDURE — 99900035 HC TECH TIME PER 15 MIN (STAT)

## 2022-04-02 PROCEDURE — 31720 CLEARANCE OF AIRWAYS: CPT

## 2022-04-02 RX ORDER — SODIUM CHLORIDE FOR INHALATION 3 %
4 VIAL, NEBULIZER (ML) INHALATION EVERY 6 HOURS
Status: DISCONTINUED | OUTPATIENT
Start: 2022-04-02 | End: 2022-04-02

## 2022-04-02 RX ORDER — SODIUM,POTASSIUM PHOSPHATES 280-250MG
2 POWDER IN PACKET (EA) ORAL
Status: DISCONTINUED | OUTPATIENT
Start: 2022-04-02 | End: 2022-04-04

## 2022-04-02 RX ORDER — MAGNESIUM SULFATE HEPTAHYDRATE 40 MG/ML
2 INJECTION, SOLUTION INTRAVENOUS ONCE
Status: COMPLETED | OUTPATIENT
Start: 2022-04-02 | End: 2022-04-02

## 2022-04-02 RX ORDER — LANOLIN ALCOHOL/MO/W.PET/CERES
800 CREAM (GRAM) TOPICAL
Status: DISCONTINUED | OUTPATIENT
Start: 2022-04-02 | End: 2022-04-04

## 2022-04-02 RX ORDER — TALC
6 POWDER (GRAM) TOPICAL NIGHTLY PRN
Status: CANCELLED | OUTPATIENT
Start: 2022-04-02

## 2022-04-02 RX ADMIN — RIFAXIMIN 550 MG: 550 TABLET ORAL at 08:04

## 2022-04-02 RX ADMIN — SODIUM CHLORIDE SOLN NEBU 3% 4 ML: 3 NEBU SOLN at 12:04

## 2022-04-02 RX ADMIN — FUROSEMIDE 40 MG: 10 INJECTION, SOLUTION INTRAMUSCULAR; INTRAVENOUS at 08:04

## 2022-04-02 RX ADMIN — FAMOTIDINE 20 MG: 20 TABLET ORAL at 09:04

## 2022-04-02 RX ADMIN — OXYCODONE 5 MG: 5 TABLET ORAL at 08:04

## 2022-04-02 RX ADMIN — CEFEPIME HYDROCHLORIDE 1 G: 1 INJECTION, SOLUTION INTRAVENOUS at 10:04

## 2022-04-02 RX ADMIN — MUPIROCIN: 20 OINTMENT TOPICAL at 08:04

## 2022-04-02 RX ADMIN — ALBUTEROL SULFATE 2.5 MG: 2.5 SOLUTION RESPIRATORY (INHALATION) at 12:04

## 2022-04-02 RX ADMIN — ENOXAPARIN SODIUM 30 MG: 30 INJECTION, SOLUTION INTRAVENOUS; SUBCUTANEOUS at 04:04

## 2022-04-02 RX ADMIN — NICOTINE 1 PATCH: 14 PATCH, EXTENDED RELEASE TRANSDERMAL at 08:04

## 2022-04-02 RX ADMIN — ALBUTEROL SULFATE 2.5 MG: 2.5 SOLUTION RESPIRATORY (INHALATION) at 07:04

## 2022-04-02 RX ADMIN — GUAIFENESIN 200 MG: 100 SOLUTION ORAL at 05:04

## 2022-04-02 RX ADMIN — Medication 6 MG: at 08:04

## 2022-04-02 RX ADMIN — CEFEPIME HYDROCHLORIDE 1 G: 1 INJECTION, SOLUTION INTRAVENOUS at 02:04

## 2022-04-02 RX ADMIN — OXYCODONE 5 MG: 5 TABLET ORAL at 04:04

## 2022-04-02 RX ADMIN — LACTULOSE 20 G: 20 SOLUTION ORAL at 08:04

## 2022-04-02 RX ADMIN — GUAIFENESIN 200 MG: 100 SOLUTION ORAL at 02:04

## 2022-04-02 RX ADMIN — FAMOTIDINE 20 MG: 20 TABLET ORAL at 08:04

## 2022-04-02 RX ADMIN — CEFEPIME HYDROCHLORIDE 1 G: 1 INJECTION, SOLUTION INTRAVENOUS at 05:04

## 2022-04-02 RX ADMIN — POTASSIUM BICARBONATE 50 MEQ: 978 TABLET, EFFERVESCENT ORAL at 05:04

## 2022-04-02 RX ADMIN — SODIUM CHLORIDE SOLN NEBU 3% 4 ML: 3 NEBU SOLN at 07:04

## 2022-04-02 RX ADMIN — MAGNESIUM SULFATE 2 G: 2 INJECTION INTRAVENOUS at 06:04

## 2022-04-02 RX ADMIN — GUAIFENESIN 200 MG: 100 SOLUTION ORAL at 10:04

## 2022-04-02 RX ADMIN — LORAZEPAM 0.5 MG: 0.5 TABLET ORAL at 08:04

## 2022-04-02 NOTE — PLAN OF CARE
Bedside swallow evaluation completed. Recommending patient continue to be NPO given s/sx of airway compromise with PO intake and respiratory needs. ST services will follow up for ongoing swallow assessment.

## 2022-04-02 NOTE — ASSESSMENT & PLAN NOTE
Bilateral pleural effusions  Pleural consolidation, L  Atelectasis  Mucus Plugging    No baseline home O2 per patient. Presenting with 5 L NC O2. Unclear etiology, though previous documentation suggestive of pleural effusion vs component of PNA. Unclear etiology as patient seems to be volume down on exam. No reported history of heart failure or COPD (though extensive tobacco use history). Etiology may be 2/2 to abdominal distention causing diaphragmatic pressure vs infectious etiology vs pleural effusion d/t third spacing vs infection? CT demonstrating bilateral pleural effusions and lingula consolidation; concerns for possible PNA    - 03/29 patient decompensated while on 5L > 10 HFNC > intubation; CXR demonstrating L lung atelectasis / mucus plugging  - 03/31 bronchoscopy for mucus evacuation; RCx sent, mild improvement on CXR following  - RCx few GNR on gram stain   - Extubated to NC on 04/01  - Cefepime for CAP coverage day 5/5 (recent hospitalization for pseudomonas coverage); if pseudomonas, will require extended therapy course  - Pulmonary hygiene, CPT, albuterol inhaler  - Continuous pulse ox  - Monitor fever curve; given lack of infectious presentation, will defer abx course now  - Lactate wnl  - Monitor BCx, RCx  - Aspiration precaution

## 2022-04-02 NOTE — SUBJECTIVE & OBJECTIVE
Interval History/Significant Events: AF HDS NAEON. No longer extubated, on nightly BiPAP. Still with poor cough reflex and subsequent transient desaturation due to mucus in airway.     Review of Systems   Constitutional:  Positive for activity change and fatigue. Negative for chills, diaphoresis and fever.   HENT:  Positive for dental problem (all teeth removed previously d/t poor dentition). Negative for sneezing and sore throat.    Eyes:  Negative for pain and visual disturbance.   Respiratory:  Negative for cough, chest tightness, shortness of breath and wheezing.    Cardiovascular:  Negative for chest pain, palpitations and leg swelling.   Gastrointestinal:  Positive for abdominal distention. Negative for abdominal pain, blood in stool, constipation, diarrhea, nausea and vomiting.   Genitourinary:  Negative for dysuria, hematuria and urgency.   Musculoskeletal:  Negative for arthralgias and myalgias.   Skin:  Positive for color change (jaundice). Negative for rash.   Neurological:  Positive for weakness. Negative for tremors, syncope, light-headedness and numbness.   Psychiatric/Behavioral:  Negative for agitation and confusion.    Objective:     Vital Signs (Most Recent):  Temp: 98.1 °F (36.7 °C) (04/02/22 0700)  Pulse: (!) 126 (04/02/22 1214)  Resp: (!) 34 (04/02/22 1214)  BP: (!) 137/98 (04/02/22 0800)  SpO2: 96 % (04/02/22 1214)   Vital Signs (24h Range):  Temp:  [97.1 °F (36.2 °C)-98.9 °F (37.2 °C)] 98.1 °F (36.7 °C)  Pulse:  [102-133] 126  Resp:  [24-56] 34  SpO2:  [81 %-100 %] 96 %  BP: (108-139)/() 137/98   Weight: 23 kg (50 lb 11.3 oz)  Body mass index is 9.9 kg/m².      Intake/Output Summary (Last 24 hours) at 4/2/2022 1250  Last data filed at 4/2/2022 1200  Gross per 24 hour   Intake 2273.84 ml   Output 1515 ml   Net 758.84 ml       Physical Exam  Constitutional:       General: She is not in acute distress.     Appearance: She is ill-appearing. She is not toxic-appearing or diaphoretic.       Comments: Cachetic, temporal wasting, jaundiced, weak, but in no apparent distress   HENT:      Head: Normocephalic.      Nose: Nose normal.   Eyes:      General: Scleral icterus present.      Extraocular Movements: Extraocular movements intact.      Pupils: Pupils are equal, round, and reactive to light.   Cardiovascular:      Rate and Rhythm: Regular rhythm. Tachycardia present.      Pulses: Normal pulses.      Heart sounds: Normal heart sounds. No murmur heard.  Pulmonary:      Effort: Pulmonary effort is normal. No respiratory distress.      Breath sounds: Normal breath sounds. No stridor. No wheezing or rales.      Comments: Poor cough reflex, transmitted upper airway noise  Abdominal:      General: Abdomen is flat. There is distension.      Palpations: Abdomen is soft.      Tenderness: There is no abdominal tenderness (improved). There is no guarding or rebound.   Musculoskeletal:         General: No swelling or tenderness. Normal range of motion.      Cervical back: Normal range of motion. No rigidity or tenderness.      Right lower leg: No edema.      Left lower leg: No edema.   Skin:     General: Skin is warm and dry.      Coloration: Skin is jaundiced.      Findings: Bruising and rash (petecchial rash on back) present.   Neurological:      General: No focal deficit present.      Mental Status: She is alert and oriented to person, place, and time. Mental status is at baseline.      Cranial Nerves: No cranial nerve deficit.      Sensory: No sensory deficit.      Motor: No weakness.   Psychiatric:         Mood and Affect: Mood normal.         Behavior: Behavior normal.         Thought Content: Thought content normal.       Vents:  Vent Mode: A/C (04/01/22 0705)  Ventilator Initiated: Yes (03/30/22 0141)  Set Rate: 16 BPM (04/01/22 0705)  Vt Set: 350 mL (04/01/22 0705)  Pressure Support: 0 cmH20 (04/01/22 0705)  PEEP/CPAP: 5 cmH20 (04/01/22 0705)  Oxygen Concentration (%): 40 (04/02/22 0800)  Peak Airway  Pressure: 20 cmH2O (04/01/22 0705)  Plateau Pressure: 0 cmH20 (04/01/22 0705)  Total Ve: 9.02 mL (04/01/22 0705)  Negative Inspiratory Force (cm H2O): 0 (03/30/22 1552)  F/VT Ratio<105 (RSBI): (!) 65.45 (04/01/22 0705)  Lines/Drains/Airways       Peripherally Inserted Central Catheter Line  Duration             PICC Triple Lumen left basilic -- days                  Significant Labs:    CBC/Anemia Profile:  Recent Labs   Lab 04/01/22  0307 04/02/22  0400   WBC 10.95 10.24   HGB 7.9* 8.4*   HCT 25.5* 26.9*    346   * 110*   RDW SEE COMMENT 24.7*        Chemistries:  Recent Labs   Lab 04/01/22  0307 04/01/22  1421 04/02/22  0400    138 141   K 3.7 3.2* 3.6    102 98   CO2 29 29 31*   BUN 12 12 12   CREATININE 0.4* 0.5 0.4*   CALCIUM 9.3 8.5* 8.9   ALBUMIN 2.8* 2.8* 3.0*   PROT 5.9* 5.9* 6.3   BILITOT 6.5* 6.0* 6.3*   ALKPHOS 1,037* 1,014* 1,125*   ALT 74* 71* 74*   AST 77* 79* 82*   MG 2.2  --  1.9   PHOS 4.2  --  3.2       All pertinent labs within the past 24 hours have been reviewed.    Significant Imaging:  I have reviewed all pertinent imaging results/findings within the past 24 hours.

## 2022-04-02 NOTE — NURSING
Pt laying in bed with HOB at 45'. Pt was placed back on BIPAP this afternoon r/t SOB, and dyspnea with 02 sats in the mid 80's, RR 35-44. Pt without any c/o at this time. RR 20-30 and 02 sats >94%.     Pt requested an recv'd a pain med, per Mar twice today for c/o pain to her throat from the ETT that was dc'd at 1020am yesterday.     Pt has had multiple loose BM's (laculose), and multiple episodes of urination. I tried to place a Purewik this morning, but it was ineffective as it kept the stool close to her vagina. Pt also requested that it be dc'd, saying that it never worked for her.    TF was started this afternoon as pt is kept NPO. She has a very weak cough and is unable to clear her secretions/airway; suction at bedside. Multiple times I assisted her with using the Incentive Spirometer but she was unable to perform the exercise d/t weak respiratory effort.     Her /significant other has been at bedside for the past 24hrs, he's very supportive of her.     Foam pads/dressings placed to all her bony prominences to maintain skin integrity. 2 foam dressings placed to her sacrum as the bone is very prominent and very susceptible to skin breakdown.     VSS, afebrile. No distress noted at this time. Pt uses written communication while on the BIPAP and is able to make her needs known.     Report prepared for oncoming shift.

## 2022-04-02 NOTE — ASSESSMENT & PLAN NOTE
Acute hepatic metabolic encephalopathy  Elevated transaminases  Elevated total bilirubin    35 y/o F with PMHx IVDU, polysubstance abuse presenting to [facility] as transfer from [facility] for hepatology evaluation. Initially presented with concerns for acute liver failure (encephalopathy, elevated elevated elevation of LFTs, jaundice) vs pSBO vs sepsis 2/2 abdominal infection. NGT decompression of pSBO with good response, advanced to [facility]. Started on broad spectrum abx and tapered. Started on lactulose and rifaximin with good response of encephalopathy, now A&O x 4 without signs of asterixis, tongue fasciculations, hallucinations. Transferred to [facility] for further hepatological evaluation.    - Overall etiology may be 2/2 to extensive outpatient Tylenol use (states she used to take handfuls of Tylenol for her initial abdominal pain) vs other drug interactions  - Hepatology consulted and following; appreciate assistance  - Acute hepatitis panel negative  - HIV negative  - US liver doppler / abdomen did not demonstrate appreciable biliary duct dilatation  - Lactulose 20 BID (titrate to 4-5 BMs per day)  - Rifaximin  - Ceruloplasmin wnl, Copper low  - ELIN, Anti-Sm negative  - PT/INR daily  - Trend CMP daily  - Trend CBC daily  - Monitor neurological status  - Ascites studies unremarkable for SBP

## 2022-04-02 NOTE — NURSING
Patient with desaturation to low 80's on 3L nasal cannula. NC increased to 5L without improvement. Patient reporting feeling slightly short of breath and asked to be placed back on BiPap. Bipap restarted on previous settings. RT rounding and to bedside. MD notified and feels deep NT suctioning would benefit patient most. RT to perform. MD and RT reviewing current ordered therapies to avoid mucus plugging.

## 2022-04-02 NOTE — ASSESSMENT & PLAN NOTE
H/o pSBO  H/o laparoscopic cholecystectomy    Patient with recent h/o pSBO at OSH,with good response to NGT decompression. Advanced to [facility]D. Initially complaining of RUQ abdominal pain at OSH on initial presentation. Now with mild, diffuse abdominal pain on palpation, but none at rest. Abdomen remains distended. Hypoactive bowel sounds.    - Repeat KUB / CTAP demonstrating significant bowel dilatation aw/ air-fluid levels c/w SBO  - Appreciate general surgery. NGT suction d/c on 04/01 after extubation  - Monitor BM, diet tolerance (currently with both BM and diet tolerance)  - NPO pending SLP (poor cough), ADAT following; TF per NGT in interim  - General surgery evaluated, no role for surgical intervention  - Bedside US abdomen without note for ascites or other process  - Serial abdominal exams

## 2022-04-02 NOTE — PROGRESS NOTES
[facility] - Cardiac Medical ICU  Critical Care Medicine  Progress Note    Patient Name: Carolina Baldwin  MRN: [id number]  Admission Date: 3/28/2022  Hospital Length of Stay: 5 days  Code Status: Full Code  Attending Provider: Marizol Downey MD  Primary Care Provider: Primary Doctor No   Principal Problem: Liver injury    Subjective:     HPI:  37 y/o F with PMHx IVDU on suboxone, polysubstance use (meth, opioids, tobacco), gastric ulcers initially presented to OSH for increasing jaundice, lethargy, AMS, evaluation for pSBO, increased O2 requirement (3-4 L, no baseline home O2). Was started on broad spectrum Abx at OSH and eventually discontinued. Initially presented oriented x 2 and severe jaundice, elevated ammonia, but normal LFTs? Started on lactulose and rifaximin. NGT decompression at OSH for SBO with good response, advanced to CLD. [place] [facility] [place].     [place] [facility] [place], patient is overtly jaundiced but with intact mentation (a&o x4). Complains of lower back pain that she experienced from the long transport [place] [facility] [place]. Also complains of increasing hunger and thirst (good appetite). Questionable historian, but understands the circumstance, stating she knows she is having new liver problems and is here for the hepatology team to evaluate her. Patient reports having good bowel movements and appropriate urinary voids. Specifically denies chest pain, increased work of breathing, increasing/worsening abdominal pain, fever/chills, nausea, vomiting, constipation, diarrhea, dysuria, suprapubic pain, recent IVDU (currently on suboxone), hallucinations (auditory and visual), tremors, seizures, loss of consciousness, headache, sensory/strength changes, abnormal bleeding.    Patient is from [place], [place]. Lives with boyfriend and 5 children. Of note, she calls her boyfriend her  but is not legally ; her closest relative is her sister. Substantial IVDU history (meth),  non-injectable opioid abuse. Recently stopped smoking history (smoked since 12 y/o). On suboxone per OSH report and per patient. Does not know if any active HIV, hepatitis infection. Family history of CAD, ACS, and cancer (unspecified).       Hospital/ICU Course:  [place] [facility] [place] [place], hepatology evaluation, and SBO. On arrival, patient A&O x 4 with diffuse abdominal pain w/o rebound, on 5 L NC. CTAP demonstrated diffuse bowel air levels and dilatation c/w SBO. Bilateral lung effusions present with concerns for L lingula consolidation. AF VS HDS on [place] [facility]. Bedside US without amenable ascites pocket for paracentesis. Gen Surg evaluated w/o need for surgical intervention. Started on CAP coverage for pulmonary findings. Lactulose titrated to 4-5 BM/day, rifaximin. Hepatology consulted and work up initiated. Patient desaturated on 5 L NC and subsequently stepped up to 10 L HFNC with eventual intubation. Found to be mucus plugging per CXR (L lung atelectasis with L midline shift). Levophed started during intubation, weaned off. Patient respiratory status improving. Bronch completed for mucus evacuation on 03/31. Extubated to NC with nightly BiPAP on 04/01. Suction turned off to NGT per gen surg recs. Increased airway mucus production, poor cough reflex. SLP swallow eval; NPO, TF per NGT in interim.      Interval History/Significant Events: AF HDS NAEON. No longer extubated, on nightly BiPAP. Still with poor cough reflex and subsequent transient desaturation due to mucus in airway.     Review of Systems   Constitutional:  Positive for activity change and fatigue. Negative for chills, diaphoresis and fever.   HENT:  Positive for dental problem (all teeth removed previously d/t poor dentition). Negative for sneezing and sore throat.    Eyes:  Negative for pain and visual disturbance.   Respiratory:  Negative for cough, chest tightness, shortness of breath and wheezing.    Cardiovascular:   Negative for chest pain, palpitations and leg swelling.   Gastrointestinal:  Positive for abdominal distention. Negative for abdominal pain, blood in stool, constipation, diarrhea, nausea and vomiting.   Genitourinary:  Negative for dysuria, hematuria and urgency.   Musculoskeletal:  Negative for arthralgias and myalgias.   Skin:  Positive for color change (jaundice). Negative for rash.   Neurological:  Positive for weakness. Negative for tremors, syncope, light-headedness and numbness.   Psychiatric/Behavioral:  Negative for agitation and confusion.    Objective:     Vital Signs (Most Recent):  Temp: 98.1 °F (36.7 °C) (04/02/22 0700)  Pulse: (!) 126 (04/02/22 1214)  Resp: (!) 34 (04/02/22 1214)  BP: (!) 137/98 (04/02/22 0800)  SpO2: 96 % (04/02/22 1214)   Vital Signs (24h Range):  Temp:  [97.1 °F (36.2 °C)-98.9 °F (37.2 °C)] 98.1 °F (36.7 °C)  Pulse:  [102-133] 126  Resp:  [24-56] 34  SpO2:  [81 %-100 %] 96 %  BP: (108-139)/() 137/98   Weight: 23 kg (50 lb 11.3 oz)  Body mass index is 9.9 kg/m².      Intake/Output Summary (Last 24 hours) at 4/2/2022 1250  Last data filed at 4/2/2022 1200  Gross per 24 hour   Intake 2273.84 ml   Output 1515 ml   Net 758.84 ml       Physical Exam  Constitutional:       General: She is not in acute distress.     Appearance: She is ill-appearing. She is not toxic-appearing or diaphoretic.      Comments: Cachetic, temporal wasting, jaundiced, weak, but in no apparent distress   HENT:      Head: Normocephalic.      Nose: Nose normal.   Eyes:      General: Scleral icterus present.      Extraocular Movements: Extraocular movements intact.      Pupils: Pupils are equal, round, and reactive to light.   Cardiovascular:      Rate and Rhythm: Regular rhythm. Tachycardia present.      Pulses: Normal pulses.      Heart sounds: Normal heart sounds. No murmur heard.  Pulmonary:      Effort: Pulmonary effort is normal. No respiratory distress.      Breath sounds: Normal breath sounds. No  stridor. No wheezing or rales.      Comments: Poor cough reflex, transmitted upper airway noise  Abdominal:      General: Abdomen is flat. There is distension.      Palpations: Abdomen is soft.      Tenderness: There is no abdominal tenderness (improved). There is no guarding or rebound.   Musculoskeletal:         General: No swelling or tenderness. Normal range of motion.      Cervical back: Normal range of motion. No rigidity or tenderness.      Right lower leg: No edema.      Left lower leg: No edema.   Skin:     General: Skin is warm and dry.      Coloration: Skin is jaundiced.      Findings: Bruising and rash (petecchial rash on back) present.   Neurological:      General: No focal deficit present.      Mental Status: She is alert and oriented to person, place, and time. Mental status is at baseline.      Cranial Nerves: No cranial nerve deficit.      Sensory: No sensory deficit.      Motor: No weakness.   Psychiatric:         Mood and Affect: Mood normal.         Behavior: Behavior normal.         Thought Content: Thought content normal.       Vents:  Vent Mode: A/C (04/01/22 0705)  Ventilator Initiated: Yes (03/30/22 0141)  Set Rate: 16 BPM (04/01/22 0705)  Vt Set: 350 mL (04/01/22 0705)  Pressure Support: 0 cmH20 (04/01/22 0705)  PEEP/CPAP: 5 cmH20 (04/01/22 0705)  Oxygen Concentration (%): 40 (04/02/22 0800)  Peak Airway Pressure: 20 cmH2O (04/01/22 0705)  Plateau Pressure: 0 cmH20 (04/01/22 0705)  Total Ve: 9.02 mL (04/01/22 0705)  Negative Inspiratory Force (cm H2O): 0 (03/30/22 1552)  F/VT Ratio<105 (RSBI): (!) 65.45 (04/01/22 0705)  Lines/Drains/Airways       Peripherally Inserted Central Catheter Line  Duration             PICC Triple Lumen left basilic -- days                  Significant Labs:    CBC/Anemia Profile:  Recent Labs   Lab 04/01/22  0307 04/02/22  0400   WBC 10.95 10.24   HGB 7.9* 8.4*   HCT 25.5* 26.9*    346   * 110*   RDW SEE COMMENT 24.7*        Chemistries:  Recent Labs    Lab 04/01/22  0307 04/01/22  1421 04/02/22  0400    138 141   K 3.7 3.2* 3.6    102 98   CO2 29 29 31*   BUN 12 12 12   CREATININE 0.4* 0.5 0.4*   CALCIUM 9.3 8.5* 8.9   ALBUMIN 2.8* 2.8* 3.0*   PROT 5.9* 5.9* 6.3   BILITOT 6.5* 6.0* 6.3*   ALKPHOS 1,037* 1,014* 1,125*   ALT 74* 71* 74*   AST 77* 79* 82*   MG 2.2  --  1.9   PHOS 4.2  --  3.2       All pertinent labs within the past 24 hours have been reviewed.    Significant Imaging:  I have reviewed all pertinent imaging results/findings within the past 24 hours.      ABG  No results for input(s): PH, PO2, PCO2, HCO3, BE in the last 168 hours.  Assessment/Plan:     Psychiatric  Polysubstance abuse  Opioid abuse  Tobacco abuse  Methamphetamine abuse    Noted to be on suboxone through suboxone clinic. Unclear historical path.    - Addiction Psych consult for suboxone; per their report, patient is not on suboxone regularly and not interested at this time  - Urine toxicology negative at this time  - HIV negative  - Hep panel negative    Pulmonary  Acute hypoxemic respiratory failure  Bilateral pleural effusions  Pleural consolidation, L  Atelectasis  Mucus Plugging    No baseline home O2 per patient. Presenting with 5 L NC O2. Unclear etiology, though previous documentation suggestive of pleural effusion vs component of PNA. Unclear etiology as patient seems to be volume down on exam. No reported history of heart failure or COPD (though extensive tobacco use history). Etiology may be 2/2 to abdominal distention causing diaphragmatic pressure vs infectious etiology vs pleural effusion d/t third spacing vs infection? CT demonstrating bilateral pleural effusions and lingula consolidation; concerns for possible PNA    - 03/29 patient decompensated while on 5L > 10 HFNC > intubation; CXR demonstrating L lung atelectasis / mucus plugging  - 03/31 bronchoscopy for mucus evacuation; RCx sent, mild improvement on CXR following  - RCx few GNR on gram stain   -  Extubated to NC on 04/01  - Cefepime for CAP coverage day 5/5 (recent hospitalization for pseudomonas coverage); if pseudomonas, will require extended therapy course  - Pulmonary hygiene, CPT, albuterol inhaler  - Continuous pulse ox  - Monitor fever curve; given lack of infectious presentation, will defer abx course now  - Lactate wnl  - Monitor BCx, RCx  - Aspiration precaution    Cardiac/Vascular  Paroxysmal tachycardia  Presenting with tachycardia to 120s. Noted in OSH previous progress note and was tempered with BB. Unclear etiology, potentially volume down driving sinus tachycardia as patient has been on NGT suction d/t pSBO with limited PO intake. Also appears to be extremely malnourished. In setting of potential acute pulmonary process, may be related to acute infection vs mucus plugging / atelectasis    - Repeat EKG demonstrating sinus tachycardia and abnormal R wave progression; no previous EKG for comparison  - Cardiac telemetry  - Electrolyte trend, K > 4, Mg > 2  - Will hold trial of IVF bolus until further evaluation regarding volume status (BNP elevated 1000+ on admission)  - TTE revealing EF 35-40%, PAP 34,       GI  Generalized abdominal pain  H/o pSBO  H/o laparoscopic cholecystectomy    Patient with recent h/o pSBO at OSH,with good response to NGT decompression. Advanced to CLD. Initially complaining of RUQ abdominal pain at OSH on initial presentation. Now with mild, diffuse abdominal pain on palpation, but none at rest. Abdomen remains distended. Hypoactive bowel sounds.    - Repeat KUB / CTAP demonstrating significant bowel dilatation aw/ air-fluid levels c/w SBO  - Appreciate general surgery. NGT suction d/c on 04/01 after extubation  - Monitor BM, diet tolerance (currently with both BM and diet tolerance)  - NPO pending SLP (poor cough), ADAT following; TF per NGT in interim  - General surgery evaluated, no role for surgical intervention  - Bedside US abdomen without note for ascites or other  process  - Serial abdominal exams    Orthopedic  * Liver injury  Acute hepatic metabolic encephalopathy  Elevated transaminases  Elevated total bilirubin    37 y/o F with PMHx IVDU, polysubstance abuse presenting to [facility] [place] as transfer from [facility] for hepatology evaluation. Initially presented with concerns for acute liver failure (encephalopathy, elevated elevated elevation of LFTs, jaundice) vs pSBO vs sepsis 2/2 abdominal infection. NGT decompression of pSBO with good response, advanced to CLD. Started on broad spectrum abx and tapered. Started on lactulose and rifaximin with good response of encephalopathy, now A&O x 4 without signs of asterixis, tongue fasciculations, hallucinations. Transferred to [facility] [place] for further hepatological evaluation.    - Overall etiology may be 2/2 to extensive outpatient Tylenol use (states she used to take handfuls of Tylenol for her initial abdominal pain) vs other drug interactions  - Hepatology consulted and following; appreciate assistance  - Acute hepatitis panel negative  - HIV negative  - US liver doppler / abdomen did not demonstrate appreciable biliary duct dilatation  - Lactulose 20 BID (titrate to 4-5 BMs per day)  - Rifaximin  - Ceruloplasmin wnl, Copper low  - ELIN, Anti-Sm negative  - PT/INR daily  - Trend CMP daily  - Trend CBC daily  - Monitor neurological status  - Ascites studies unremarkable for SBP    Other  Failure to thrive in adult  Temporal wasting  Malnutrition    - Boost when tolerated  - Nutrition consult       Critical Care Daily Checklist:    A: Awake: RASS Goal/Actual Goal:    Actual: Wan Agitation Sedation Scale (RASS): Alert and calm   B: Spontaneous Breathing Trial Performed? Spon. Breathing Trial Initiated?: Initiated (04/01/22 1018)   C: SAT & SBT Coordinated?  N/A                      D: Delirium: CAM-ICU Overall CAM-ICU: Negative   E: Early Mobility Performed? Yes   F: Feeding Goal: Goals: Meet % EEN, EPN by RD f/u  date  Status: Nutrition Goal Status: new   Current Diet Order   Procedures    Diet NPO      AS: Analgesia/Sedation OFF   T: Thromboembolic Prophylaxis Lovenox   H: HOB > 300 Yes   U: Stress Ulcer Prophylaxis (if needed) Pepcid   G: Glucose Control SSI   B: Bowel Function Stool Occurrence: 1   I: Indwelling Catheter (Lines & Perez) Necessity PICC   D: De-escalation of Antimicrobials/Pharmacotherapies Cefepime 5/5    Plan for the day/ETD ABx therapy  Airway watch  Aggressive Pulm supportive care    Code Status:  Family/Goals of Care: Full Code         Critical secondary to Patient has a condition that poses threat to life and bodily function: Severe Respiratory Distress      Critical care was time spent personally by me on the following activities: 40 min development of treatment plan with patient or surrogate and bedside caregivers, discussions with consultants, evaluation of patient's response to treatment, examination of patient, ordering and performing treatments and interventions, ordering and review of laboratory studies, ordering and review of radiographic studies, pulse oximetry, re-evaluation of patient's condition. This critical care time did not overlap with that of any other provider or involve time for any procedures.     Geoffrey Gloria MD  Critical Care Medicine  [facility] - Cardiac Medical ICU

## 2022-04-02 NOTE — PLAN OF CARE
CMICU DAILY GOALS       A: Awake    RASS: Goal -    Actual - RASS (Wan Agitation-Sedation Scale): 0-->alert and calm   Restraint necessity: Clinical Justification: Treatment Interference  B: Breathe   SBT: Not intubated   C: Coordinate A & B, analgesics/sedatives   Pain: managed    SAT: Not intubated  D: Delirium   CAM-ICU: Overall CAM-ICU: Negative  E: Early(intubated/ Progressive (non-intubated) Mobility   MOVE Screen: Pass   Activity: Activity Management: Arm raise - L1  FAS: Feeding/Nutrition   Diet order: Diet/Nutrition Received: full liquid, Specialty Diet/Nutrition Received: other (see comments) (CLD)  T: Thrombus   DVT prophylaxis: VTE Required Core Measure: (SCDs) Sequential compression device initiated/maintained  H: HOB Elevation   Head of Bed (HOB) Positioning: HOB at 30-45 degrees  U: Ulcer Prophylaxis   GI: yes  G: Glucose control   managed    S: Skin   Bathing/Skin Care: incontinence care  Device Skin Pressure Protection: absorbent pad utilized/changed, adhesive use limited  Pressure Reduction Devices: specialty bed utilized  Pressure Reduction Techniques: frequent weight shift encouraged  Skin Protection: adhesive use limited, incontinence pads utilized  B: Bowel Function   diarrhea   I: Indwelling Catheters   Perez necessity: [REMOVED]      Urethral Catheter 03/28/22 1848 Latex-Reason for Continuing Urinary Catheterization: Critically ill in ICU and requiring hourly monitoring of intake/output   CVC necessity: No  D: De-escalation Antibiotics   Yes    Family/Goals of care/Code Status   Code Status: Full Code    24H Vital Sign Range  Temp:  [97.1 °F (36.2 °C)-99 °F (37.2 °C)]   Pulse:  []   Resp:  [20-56]   BP: (108-141)/()   SpO2:  [81 %-100 %]      Shift Events   Ms. Baldwin had shortness of breath last night with decreased oxygen sat. NP and MD called and updated. New orders rec'd. Pt placed on CPAP at 40%FIO2,and Cxray. O2 increased and work of breathing decreased.    VS and  assessment per flow sheet, patient progressing towards goals as tolerated, plan of care reviewed with Ms. Baldwin, all concerns addressed, will continue to monitor.    Danae Hawkins

## 2022-04-03 LAB
A1AT SERPL-MCNC: 328 MG/DL (ref 100–190)
ALBUMIN SERPL BCP-MCNC: 3.1 G/DL (ref 3.5–5.2)
ALBUMIN SERPL BCP-MCNC: 3.5 G/DL (ref 3.5–5.2)
ALLENS TEST: ABNORMAL
ALLENS TEST: ABNORMAL
ALP SERPL-CCNC: 1236 U/L (ref 55–135)
ALP SERPL-CCNC: 1343 U/L (ref 55–135)
ALT SERPL W/O P-5'-P-CCNC: 79 U/L (ref 10–44)
ALT SERPL W/O P-5'-P-CCNC: 85 U/L (ref 10–44)
ANION GAP SERPL CALC-SCNC: 11 MMOL/L (ref 8–16)
ANION GAP SERPL CALC-SCNC: 13 MMOL/L (ref 8–16)
AST SERPL-CCNC: 73 U/L (ref 10–40)
AST SERPL-CCNC: 74 U/L (ref 10–40)
BACTERIA BLD CULT: NORMAL
BACTERIA BLD CULT: NORMAL
BACTERIA FLD CULT: NORMAL
BASOPHILS # BLD AUTO: 0.03 K/UL (ref 0–0.2)
BASOPHILS NFR BLD: 0.3 % (ref 0–1.9)
BILIRUB SERPL-MCNC: 5.7 MG/DL (ref 0.1–1)
BILIRUB SERPL-MCNC: 6.4 MG/DL (ref 0.1–1)
BUN SERPL-MCNC: 16 MG/DL (ref 6–20)
BUN SERPL-MCNC: 16 MG/DL (ref 6–20)
CALCIUM SERPL-MCNC: 9.5 MG/DL (ref 8.7–10.5)
CALCIUM SERPL-MCNC: 9.5 MG/DL (ref 8.7–10.5)
CHLORIDE SERPL-SCNC: 93 MMOL/L (ref 95–110)
CHLORIDE SERPL-SCNC: 93 MMOL/L (ref 95–110)
CO2 SERPL-SCNC: 33 MMOL/L (ref 23–29)
CO2 SERPL-SCNC: 34 MMOL/L (ref 23–29)
CREAT SERPL-MCNC: 0.4 MG/DL (ref 0.5–1.4)
CREAT SERPL-MCNC: 0.5 MG/DL (ref 0.5–1.4)
DELSYS: ABNORMAL
DELSYS: ABNORMAL
DIFFERENTIAL METHOD: ABNORMAL
EOSINOPHIL # BLD AUTO: 0 K/UL (ref 0–0.5)
EOSINOPHIL NFR BLD: 0.2 % (ref 0–8)
EP: 5
EP: 5
ERYTHROCYTE [DISTWIDTH] IN BLOOD BY AUTOMATED COUNT: 23.2 % (ref 11.5–14.5)
ERYTHROCYTE [SEDIMENTATION RATE] IN BLOOD BY WESTERGREN METHOD: 12 MM/H
ERYTHROCYTE [SEDIMENTATION RATE] IN BLOOD BY WESTERGREN METHOD: 20 MM/H
EST. GFR  (AFRICAN AMERICAN): >60 ML/MIN/1.73 M^2
EST. GFR  (AFRICAN AMERICAN): >60 ML/MIN/1.73 M^2
EST. GFR  (NON AFRICAN AMERICAN): >60 ML/MIN/1.73 M^2
EST. GFR  (NON AFRICAN AMERICAN): >60 ML/MIN/1.73 M^2
FERRITIN SERPL-MCNC: 2104 NG/ML (ref 20–300)
FIO2: 60
FIO2: 80
GLUCOSE SERPL-MCNC: 121 MG/DL (ref 70–110)
GLUCOSE SERPL-MCNC: 125 MG/DL (ref 70–110)
HCO3 UR-SCNC: 39.4 MMOL/L (ref 24–28)
HCO3 UR-SCNC: 44.3 MMOL/L (ref 24–28)
HCT VFR BLD AUTO: 27.8 % (ref 37–48.5)
HGB BLD-MCNC: 8.7 G/DL (ref 12–16)
IMM GRANULOCYTES # BLD AUTO: 0.16 K/UL (ref 0–0.04)
IMM GRANULOCYTES NFR BLD AUTO: 1.5 % (ref 0–0.5)
INR PPP: 1.1 (ref 0.8–1.2)
IP: 12
IP: 16
IRON SERPL-MCNC: 34 UG/DL (ref 30–160)
LYMPHOCYTES # BLD AUTO: 1.4 K/UL (ref 1–4.8)
LYMPHOCYTES NFR BLD: 13.5 % (ref 18–48)
MAGNESIUM SERPL-MCNC: 1.9 MG/DL (ref 1.6–2.6)
MAGNESIUM SERPL-MCNC: 2.9 MG/DL (ref 1.6–2.6)
MCH RBC QN AUTO: 35.1 PG (ref 27–31)
MCHC RBC AUTO-ENTMCNC: 31.3 G/DL (ref 32–36)
MCV RBC AUTO: 112 FL (ref 82–98)
MODE: ABNORMAL
MODE: ABNORMAL
MONOCYTES # BLD AUTO: 0.6 K/UL (ref 0.3–1)
MONOCYTES NFR BLD: 5.7 % (ref 4–15)
NEUTROPHILS # BLD AUTO: 8.2 K/UL (ref 1.8–7.7)
NEUTROPHILS NFR BLD: 78.8 % (ref 38–73)
NRBC BLD-RTO: 0 /100 WBC
PCO2 BLDA: 109.3 MMHG (ref 35–45)
PCO2 BLDA: 69.7 MMHG (ref 35–45)
PH SMN: 7.22 [PH] (ref 7.35–7.45)
PH SMN: 7.36 [PH] (ref 7.35–7.45)
PHOSPHATE SERPL-MCNC: 3.9 MG/DL (ref 2.7–4.5)
PHOSPHATE SERPL-MCNC: 6.1 MG/DL (ref 2.7–4.5)
PLATELET # BLD AUTO: 388 K/UL (ref 150–450)
PMV BLD AUTO: 11 FL (ref 9.2–12.9)
PO2 BLDA: 44 MMHG (ref 40–60)
PO2 BLDA: 83 MMHG (ref 80–100)
POC BE: 14 MMOL/L
POC BE: 17 MMOL/L
POC SATURATED O2: 75 % (ref 95–100)
POC SATURATED O2: 92 % (ref 95–100)
POC TCO2: 41 MMOL/L (ref 24–29)
POC TCO2: 48 MMOL/L (ref 23–27)
POTASSIUM SERPL-SCNC: 3.7 MMOL/L (ref 3.5–5.1)
POTASSIUM SERPL-SCNC: 5.6 MMOL/L (ref 3.5–5.1)
PROT SERPL-MCNC: 6.8 G/DL (ref 6–8.4)
PROT SERPL-MCNC: 7 G/DL (ref 6–8.4)
PROTHROMBIN TIME: 11.5 SEC (ref 9–12.5)
RBC # BLD AUTO: 2.48 M/UL (ref 4–5.4)
SAMPLE: ABNORMAL
SAMPLE: ABNORMAL
SATURATED IRON: 18 % (ref 20–50)
SITE: ABNORMAL
SITE: ABNORMAL
SODIUM SERPL-SCNC: 137 MMOL/L (ref 136–145)
SODIUM SERPL-SCNC: 140 MMOL/L (ref 136–145)
TOTAL IRON BINDING CAPACITY: 194 UG/DL (ref 250–450)
TRANSFERRIN SERPL-MCNC: 131 MG/DL (ref 200–375)
WBC # BLD AUTO: 10.44 K/UL (ref 3.9–12.7)

## 2022-04-03 PROCEDURE — 82728 ASSAY OF FERRITIN: CPT

## 2022-04-03 PROCEDURE — 36600 WITHDRAWAL OF ARTERIAL BLOOD: CPT

## 2022-04-03 PROCEDURE — 94640 AIRWAY INHALATION TREATMENT: CPT

## 2022-04-03 PROCEDURE — S4991 NICOTINE PATCH NONLEGEND: HCPCS

## 2022-04-03 PROCEDURE — 85610 PROTHROMBIN TIME: CPT | Performed by: STUDENT IN AN ORGANIZED HEALTH CARE EDUCATION/TRAINING PROGRAM

## 2022-04-03 PROCEDURE — 63600175 PHARM REV CODE 636 W HCPCS: Performed by: STUDENT IN AN ORGANIZED HEALTH CARE EDUCATION/TRAINING PROGRAM

## 2022-04-03 PROCEDURE — 80321 ALCOHOLS BIOMARKERS 1OR 2: CPT

## 2022-04-03 PROCEDURE — 80053 COMPREHEN METABOLIC PANEL: CPT

## 2022-04-03 PROCEDURE — 25000242 PHARM REV CODE 250 ALT 637 W/ HCPCS

## 2022-04-03 PROCEDURE — 99900035 HC TECH TIME PER 15 MIN (STAT)

## 2022-04-03 PROCEDURE — 84466 ASSAY OF TRANSFERRIN: CPT

## 2022-04-03 PROCEDURE — 20000000 HC ICU ROOM

## 2022-04-03 PROCEDURE — 99291 CRITICAL CARE FIRST HOUR: CPT | Mod: ,,, | Performed by: INTERNAL MEDICINE

## 2022-04-03 PROCEDURE — 82103 ALPHA-1-ANTITRYPSIN TOTAL: CPT

## 2022-04-03 PROCEDURE — 83735 ASSAY OF MAGNESIUM: CPT

## 2022-04-03 PROCEDURE — 94761 N-INVAS EAR/PLS OXIMETRY MLT: CPT

## 2022-04-03 PROCEDURE — 25000003 PHARM REV CODE 250: Performed by: STUDENT IN AN ORGANIZED HEALTH CARE EDUCATION/TRAINING PROGRAM

## 2022-04-03 PROCEDURE — 85025 COMPLETE CBC W/AUTO DIFF WBC: CPT

## 2022-04-03 PROCEDURE — 99291 PR CRITICAL CARE, E/M 30-74 MINUTES: ICD-10-PCS | Mod: ,,, | Performed by: INTERNAL MEDICINE

## 2022-04-03 PROCEDURE — 31720 CLEARANCE OF AIRWAYS: CPT

## 2022-04-03 PROCEDURE — 94660 CPAP INITIATION&MGMT: CPT

## 2022-04-03 PROCEDURE — 99900026 HC AIRWAY MAINTENANCE (STAT)

## 2022-04-03 PROCEDURE — 25000003 PHARM REV CODE 250

## 2022-04-03 PROCEDURE — 27100171 HC OXYGEN HIGH FLOW UP TO 24 HOURS

## 2022-04-03 PROCEDURE — 63600175 PHARM REV CODE 636 W HCPCS

## 2022-04-03 PROCEDURE — 84100 ASSAY OF PHOSPHORUS: CPT

## 2022-04-03 PROCEDURE — 85347 COAGULATION TIME ACTIVATED: CPT

## 2022-04-03 PROCEDURE — 82803 BLOOD GASES ANY COMBINATION: CPT

## 2022-04-03 RX ORDER — MAGNESIUM SULFATE HEPTAHYDRATE 40 MG/ML
2 INJECTION, SOLUTION INTRAVENOUS ONCE
Status: COMPLETED | OUTPATIENT
Start: 2022-04-03 | End: 2022-04-03

## 2022-04-03 RX ORDER — FUROSEMIDE 10 MG/ML
40 INJECTION INTRAMUSCULAR; INTRAVENOUS DAILY
Status: DISCONTINUED | OUTPATIENT
Start: 2022-04-04 | End: 2022-04-04

## 2022-04-03 RX ADMIN — OXYCODONE 5 MG: 5 TABLET ORAL at 03:04

## 2022-04-03 RX ADMIN — ALBUTEROL SULFATE 2.5 MG: 2.5 SOLUTION RESPIRATORY (INHALATION) at 07:04

## 2022-04-03 RX ADMIN — NICOTINE 1 PATCH: 14 PATCH, EXTENDED RELEASE TRANSDERMAL at 07:04

## 2022-04-03 RX ADMIN — ALBUTEROL SULFATE 2.5 MG: 2.5 SOLUTION RESPIRATORY (INHALATION) at 12:04

## 2022-04-03 RX ADMIN — SODIUM CHLORIDE SOLN NEBU 3% 4 ML: 3 NEBU SOLN at 07:04

## 2022-04-03 RX ADMIN — SODIUM CHLORIDE SOLN NEBU 3% 4 ML: 3 NEBU SOLN at 12:04

## 2022-04-03 RX ADMIN — LORAZEPAM 0.5 MG: 0.5 TABLET ORAL at 11:04

## 2022-04-03 RX ADMIN — RIFAXIMIN 550 MG: 550 TABLET ORAL at 09:04

## 2022-04-03 RX ADMIN — HYDROXYZINE HYDROCHLORIDE 25 MG: 25 TABLET ORAL at 09:04

## 2022-04-03 RX ADMIN — GUAIFENESIN 200 MG: 100 SOLUTION ORAL at 09:04

## 2022-04-03 RX ADMIN — CEFEPIME HYDROCHLORIDE 1 G: 1 INJECTION, SOLUTION INTRAVENOUS at 06:04

## 2022-04-03 RX ADMIN — GUAIFENESIN 200 MG: 100 SOLUTION ORAL at 06:04

## 2022-04-03 RX ADMIN — OXYCODONE 5 MG: 5 TABLET ORAL at 12:04

## 2022-04-03 RX ADMIN — POTASSIUM BICARBONATE 50 MEQ: 978 TABLET, EFFERVESCENT ORAL at 07:04

## 2022-04-03 RX ADMIN — MAGNESIUM SULFATE 2 G: 2 INJECTION INTRAVENOUS at 07:04

## 2022-04-03 RX ADMIN — FAMOTIDINE 20 MG: 20 TABLET ORAL at 08:04

## 2022-04-03 RX ADMIN — LACTULOSE 20 G: 20 SOLUTION ORAL at 09:04

## 2022-04-03 RX ADMIN — FUROSEMIDE 40 MG: 10 INJECTION, SOLUTION INTRAMUSCULAR; INTRAVENOUS at 07:04

## 2022-04-03 RX ADMIN — HYDROXYZINE HYDROCHLORIDE 25 MG: 25 TABLET ORAL at 11:04

## 2022-04-03 RX ADMIN — LACTULOSE 20 G: 20 SOLUTION ORAL at 08:04

## 2022-04-03 RX ADMIN — RIFAXIMIN 550 MG: 550 TABLET ORAL at 08:04

## 2022-04-03 RX ADMIN — FAMOTIDINE 20 MG: 20 TABLET ORAL at 09:04

## 2022-04-03 NOTE — PROGRESS NOTES
[facility] - Cardiac Medical ICU  Critical Care Medicine  Progress Note    Patient Name: Carolina Baldwin  MRN: [id number]  Admission Date: 3/28/2022  Hospital Length of Stay: 6 days  Code Status: Full Code  Attending Provider: Marizol Downey MD  Primary Care Provider: Primary Doctor No   Principal Problem: Liver injury    Subjective:     HPI:  37 y/o F with PMHx IVDU on suboxone, polysubstance use (meth, opioids, tobacco), gastric ulcers initially presented to OSH for increasing jaundice, lethargy, AMS, evaluation for pSBO, increased O2 requirement (3-4 L, no baseline home O2). Was started on broad spectrum Abx at OSH and eventually discontinued. Initially presented oriented x 2 and severe jaundice, elevated ammonia, but normal LFTs? Started on lactulose and rifaximin. NGT decompression at OSH for SBO with good response, advanced to CLD. Transferred to [facility] for hepatology evaluation.     On presentation to [facility] MICU, patient is overtly jaundiced but with intact mentation (a&o x4). Complains of lower back pain that she experienced from the long transport from OSH to [facility]. Also complains of increasing hunger and thirst (good appetite). Questionable historian, but understands the circumstance, stating she knows she is having new liver problems and is here for the hepatology team to evaluate her. Patient reports having good bowel movements and appropriate urinary voids. Specifically denies chest pain, increased work of breathing, increasing/worsening abdominal pain, fever/chills, nausea, vomiting, constipation, diarrhea, dysuria, suprapubic pain, recent IVDU (currently on suboxone), hallucinations (auditory and visual), tremors, seizures, loss of consciousness, headache, sensory/strength changes, abnormal bleeding.    Patient is from [place], [place]. Lives with boyfriend and 5 children. Of note, she calls her boyfriend her  but is not legally ; her closest relative is her sister. Substantial IVDU history (meth),  non-injectable opioid abuse. Recently stopped smoking history (smoked since 12 y/o). On suboxone per OSH report and per patient. Does not know if any active HIV, hepatitis infection. Family history of CAD, ACS, and cancer (unspecified).       Hospital/ICU Course:  Transferred from OSH to [facility] [place] MICU for HLOC, hepatology evaluation, and SBO. On arrival, patient A&O x 4 with diffuse abdominal pain w/o rebound, on 5 L NC. CTAP demonstrated diffuse bowel air levels and dilatation c/w SBO. Bilateral lung effusions present with concerns for L lingula consolidation. AF VS HDS on presentation to [facility]. Bedside US without amenable ascites pocket for paracentesis. Gen Surg evaluated w/o need for surgical intervention. Started on CAP coverage for pulmonary findings. Lactulose titrated to 4-5 BM/day, rifaximin. Hepatology consulted and work up initiated. Patient desaturated on 5 L NC and subsequently stepped up to 10 L HFNC with eventual intubation. Found to be mucus plugging per CXR (L lung atelectasis with L midline shift). Levophed started during intubation, weaned off. Patient respiratory status improving. Bronch completed for mucus evacuation on 03/31. Extubated to NC with nightly BiPAP on 04/01. Suction turned off to NGT per gen surg recs. Increased airway mucus production, poor cough reflex. SLP swallow eval; NPO, TF per NGT in interim.      Interval History/Significant Events: AF HDS NAEON. Switched from BiPAP to Comfort Flow. No new complaints of chest pain, abdominal pain.     Review of Systems   Constitutional:  Positive for activity change and fatigue. Negative for chills, diaphoresis and fever.   HENT:  Positive for dental problem (all teeth removed previously d/t poor dentition). Negative for sneezing and sore throat.    Eyes:  Negative for pain and visual disturbance.   Respiratory:  Negative for cough, chest tightness, shortness of breath and wheezing.    Cardiovascular:  Negative for chest pain, palpitations and leg  swelling.   Gastrointestinal:  Positive for abdominal distention. Negative for abdominal pain, blood in stool, constipation, diarrhea, nausea and vomiting.   Genitourinary:  Negative for dysuria, hematuria and urgency.   Musculoskeletal:  Negative for arthralgias and myalgias.   Skin:  Positive for color change (jaundice). Negative for rash.   Neurological:  Positive for weakness. Negative for tremors, syncope, light-headedness and numbness.   Psychiatric/Behavioral:  Negative for agitation and confusion.    Objective:     Vital Signs (Most Recent):  Temp: 98.5 °F (36.9 °C) (04/03/22 0700)  Pulse: (!) 115 (04/03/22 0800)  Resp: (!) 40 (04/03/22 0800)  BP: 123/83 (04/03/22 0800)  SpO2: 99 % (04/03/22 0800)   Vital Signs (24h Range):  Temp:  [97.2 °F (36.2 °C)-98.6 °F (37 °C)] 98.5 °F (36.9 °C)  Pulse:  [] 115  Resp:  [15-64] 40  SpO2:  [89 %-100 %] 99 %  BP: (112-142)/(74-98) 123/83   Weight: 23 kg (50 lb 11.3 oz)  Body mass index is 9.9 kg/m².      Intake/Output Summary (Last 24 hours) at 4/3/2022 0823  Last data filed at 4/3/2022 0601  Gross per 24 hour   Intake 218.35 ml   Output --   Net 218.35 ml       Physical Exam  Constitutional:       General: She is not in acute distress.     Appearance: She is ill-appearing. She is not toxic-appearing or diaphoretic.      Comments: Cachetic, temporal wasting, jaundiced, weak, but in no apparent distress   HENT:      Head: Normocephalic.      Nose: Nose normal.   Eyes:      General: Scleral icterus present.      Extraocular Movements: Extraocular movements intact.      Pupils: Pupils are equal, round, and reactive to light.   Cardiovascular:      Rate and Rhythm: Regular rhythm. Tachycardia present.      Pulses: Normal pulses.      Heart sounds: Normal heart sounds. No murmur heard.  Pulmonary:      Effort: Pulmonary effort is normal. No respiratory distress.      Breath sounds: Normal breath sounds. No stridor. No wheezing or rales.      Comments: Poor cough reflex,  transmitted upper airway noise  Abdominal:      General: Abdomen is flat. There is distension.      Palpations: Abdomen is soft.      Tenderness: There is no abdominal tenderness (improved). There is no guarding or rebound.   Musculoskeletal:         General: No swelling or tenderness. Normal range of motion.      Cervical back: Normal range of motion. No rigidity or tenderness.      Right lower leg: No edema.      Left lower leg: No edema.   Skin:     General: Skin is warm and dry.      Coloration: Skin is jaundiced.      Findings: Bruising and rash (petecchial rash on back) present.   Neurological:      General: No focal deficit present.      Mental Status: She is alert and oriented to person, place, and time. Mental status is at baseline.      Cranial Nerves: No cranial nerve deficit.      Sensory: No sensory deficit.      Motor: No weakness.   Psychiatric:         Mood and Affect: Mood normal.         Behavior: Behavior normal.         Thought Content: Thought content normal.       Vents:  Vent Mode: A/C (04/01/22 0705)  Ventilator Initiated: Yes (03/30/22 0141)  Set Rate: 16 BPM (04/01/22 0705)  Vt Set: 350 mL (04/01/22 0705)  Pressure Support: 0 cmH20 (04/01/22 0705)  PEEP/CPAP: 5 cmH20 (04/01/22 0705)  Oxygen Concentration (%): 50 (04/03/22 0738)  Peak Airway Pressure: 20 cmH2O (04/01/22 0705)  Plateau Pressure: 0 cmH20 (04/01/22 0705)  Total Ve: 9.02 mL (04/01/22 0705)  Negative Inspiratory Force (cm H2O): 0 (03/30/22 1552)  F/VT Ratio<105 (RSBI): (!) 65.45 (04/01/22 0705)  Lines/Drains/Airways       Peripherally Inserted Central Catheter Line  Duration             PICC Triple Lumen left basilic -- days                  Significant Labs:    CBC/Anemia Profile:  Recent Labs   Lab 04/02/22  0400 04/03/22  0302   WBC 10.24 10.44   HGB 8.4* 8.7*   HCT 26.9* 27.8*    388   * 112*   RDW 24.7* 23.2*        Chemistries:  Recent Labs   Lab 04/02/22  0400 04/02/22  1421 04/03/22  0302    138 137    K 3.6 4.0 3.7   CL 98 94* 93*   CO2 31* 30* 33*   BUN 12 13 16   CREATININE 0.4* 0.5 0.4*   CALCIUM 8.9 9.4 9.5   ALBUMIN 3.0* 3.3* 3.1*   PROT 6.3 6.9 6.8   BILITOT 6.3* 6.5* 6.4*   ALKPHOS 1,125* 1,279* 1,236*   ALT 74* 83* 79*   AST 82* 94* 74*   MG 1.9  --  1.9   PHOS 3.2  --  3.9       All pertinent labs within the past 24 hours have been reviewed.    Significant Imaging:  I have reviewed all pertinent imaging results/findings within the past 24 hours.      ABG  No results for input(s): PH, PO2, PCO2, HCO3, BE in the last 168 hours.  Assessment/Plan:     Psychiatric  Polysubstance abuse  Opioid abuse  Tobacco abuse  Methamphetamine abuse    Noted to be on suboxone through suboxone clinic. Unclear historical path.    - Addiction Psych consult for suboxone; per their report, patient is not on suboxone regularly and not interested at this time  - Urine toxicology negative at this time  - HIV negative  - Hep panel negative    Pulmonary  Acute hypoxemic respiratory failure  Bilateral pleural effusions  Pleural consolidation, L  Atelectasis  Mucus Plugging    No baseline home O2 per patient. Presenting with 5 L NC O2. Unclear etiology, though previous documentation suggestive of pleural effusion vs component of PNA. Unclear etiology as patient seems to be volume down on exam. No reported history of heart failure or COPD (though extensive tobacco use history). Etiology may be 2/2 to abdominal distention causing diaphragmatic pressure vs infectious etiology vs pleural effusion d/t third spacing vs infection? CT demonstrating bilateral pleural effusions and lingula consolidation; concerns for possible PNA    - 03/29 patient decompensated while on 5L > 10 HFNC > intubation; CXR demonstrating L lung atelectasis / mucus plugging  - 03/31 bronchoscopy for mucus evacuation; RCx sent, mild improvement on CXR following  - Extubated to NC on 04/01 to BiPAP. Continue weaning supplemental O2 down; challenging given poor cough  reflex and copious mucus production  - RCx few GNR on gram stain;  if pseudomonas, will require extended therapy course  - S/P 5 day course of Cefepime for CAP coverage  - Aggressive pulmonary hygiene, CPT, albuterol inhaler, nebulized saline  - Continuous pulse ox  - Monitor fever curve; given lack of infectious presentation, will defer abx course now  - Lactate wnl  - Monitor BCx, RCx  - Aspiration precaution    Cardiac/Vascular  Paroxysmal tachycardia  Presenting with tachycardia to 120s. Noted in OSH previous progress note and was tempered with BB. Unclear etiology, potentially volume down driving sinus tachycardia as patient has been on NGT suction d/t pSBO with limited PO intake. Also appears to be extremely malnourished. In setting of potential acute pulmonary process, may be related to acute infection vs mucus plugging / atelectasis    - Repeat EKG demonstrating sinus tachycardia and abnormal R wave progression; no previous EKG for comparison  - Cardiac telemetry  - Electrolyte trend, K > 4, Mg > 2  - Will hold trial of IVF bolus until further evaluation regarding volume status (BNP elevated 1000+ on admission)  - TTE revealing EF 35-40%, PAP 34,       GI  Generalized abdominal pain  H/o pSBO  H/o laparoscopic cholecystectomy    Patient with recent h/o pSBO at OSH,with good response to NGT decompression. Advanced to CLD. Initially complaining of RUQ abdominal pain at OSH on initial presentation. Now with mild, diffuse abdominal pain on palpation, but none at rest. Abdomen remains distended. Hypoactive bowel sounds.    - Repeat KUB / CTAP demonstrating significant bowel dilatation aw/ air-fluid levels c/w SBO  - Appreciate general surgery. NGT suction d/c on 04/01 after extubation  - Monitor BM, diet tolerance (currently with both BM and diet tolerance)  - NPO pending SLP (poor cough), ADAT following; TF per NGT in interim  - General surgery evaluated, no role for surgical intervention  - Bedside US abdomen  without note for ascites or other process  - Serial abdominal exams    Orthopedic  * Liver injury  Acute hepatic metabolic encephalopathy  Elevated transaminases  Elevated total bilirubin    35 y/o F with PMHx IVDU, polysubstance abuse presenting to [facility] as transfer from [facility] for hepatology evaluation. Initially presented with concerns for acute liver failure (encephalopathy, elevated elevated elevation of LFTs, jaundice) vs pSBO vs sepsis 2/2 abdominal infection. NGT decompression of pSBO with good response, advanced to CLD. Started on broad spectrum abx and tapered. Started on lactulose and rifaximin with good response of encephalopathy, now A&O x 4 without signs of asterixis, tongue fasciculations, hallucinations. Transferred to [facility] for further hepatological evaluation.    - Overall etiology may be 2/2 to extensive outpatient Tylenol use (states she used to take handfuls of Tylenol for her initial abdominal pain) vs other drug interactions  - Hepatology consulted and following; appreciate assistance  - Acute hepatitis panel negative  - HIV negative  - US liver doppler / abdomen did not demonstrate appreciable biliary duct dilatation  - Lactulose 20 BID (titrate to 4-5 BMs per day)  - Rifaximin  - Ceruloplasmin wnl, Copper low  - ELIN, Anti-Sm negative  - PT/INR daily  - Trend CMP daily  - Trend CBC daily  - Monitor neurological status  - Ferritin, TIBC, Fe pending  - Anti alpha 1 trypsin pending  - Ascites studies unremarkable for SBP    Other  Failure to thrive in adult  Temporal wasting  Malnutrition    - Boost when tolerated  - Nutrition consult; TFs recs in documentation; will titrate up as tolerated     Critical Care Daily Checklist:    A: Awake: RASS Goal/Actual Goal:    Actual: Wan Agitation Sedation Scale (RASS): Alert and calm   B: Spontaneous Breathing Trial Performed? Spon. Breathing Trial Initiated?: Initiated (04/01/22 1018)   C: SAT & SBT Coordinated?  N/A                       D: Delirium: CAM-ICU Overall CAM-ICU: Negative   E: Early Mobility Performed? Yes   F: Feeding Goal: Goals: Meet % EEN, EPN by RD f/u date  Status: Nutrition Goal Status: new   Current Diet Order   Procedures    Diet NPO      AS: Analgesia/Sedation OFF   T: Thromboembolic Prophylaxis Lovenox   H: HOB > 300 Yes   U: Stress Ulcer Prophylaxis (if needed) Pepcid   G: Glucose Control SSI   B: Bowel Function Stool Occurrence: 3 (total pm shift)   I: Indwelling Catheter (Lines & Perez) Necessity PICC   D: De-escalation of Antimicrobials/Pharmacotherapies ABx off    Plan for the day/ETD Airway watch, aggressive pulm support    Code Status:  Family/Goals of Care: Full Code         Critical secondary to Patient has a condition that poses threat to life and bodily function: Severe Respiratory Distress      Critical care was time spent personally by me on the following activities: 40 m development of treatment plan with patient or surrogate and bedside caregivers, discussions with consultants, evaluation of patient's response to treatment, examination of patient, ordering and performing treatments and interventions, ordering and review of laboratory studies, ordering and review of radiographic studies, pulse oximetry, re-evaluation of patient's condition. This critical care time did not overlap with that of any other provider or involve time for any procedures.     Geoffrey Gloria MD  Critical Care Medicine  [facility] - Cardiac Medical ICU

## 2022-04-03 NOTE — PLAN OF CARE
CMICU DAILY GOALS       A: Awake    RASS: Goal -    Actual - RASS (Wan Agitation-Sedation Scale): 0-->alert and calm   Restraint necessity: Clinical Justification: Treatment Interference  B: Breathe   SBT: Fail   C: Coordinate A & B, analgesics/sedatives   Pain: managed    SAT: Fail  D: Delirium   CAM-ICU: Overall CAM-ICU: Negative  E: Early(intubated/ Progressive (non-intubated) Mobility   MOVE Screen: Fail   Activity: Activity Management: Arm raise - L1, Bridge - L1, Heel slide - L1, Leg kicks - L2, Rolling - L1  FAS: Feeding/Nutrition   Diet order: Diet/Nutrition Received: NPO, Specialty Diet/Nutrition Received: other (see comments) (Isosource 1.5 continuos via NGT)  T: Thrombus   DVT prophylaxis: VTE Required Core Measure: Pharmacological prophylaxis initiated/maintained  H: HOB Elevation   Head of Bed (HOB) Positioning: HOB at 60 degrees  U: Ulcer Prophylaxis   GI: yes  G: Glucose control   managed    S: Skin   Bathing/Skin Care: bath, complete  Device Skin Pressure Protection: absorbent pad utilized/changed, adhesive use limited  Pressure Reduction Devices: alternating pressure pump (ADD), foam padding utilized, elbow protectors utilized, positioning supports utilized, heel offloading device utilized, pressure-redistributing mattress utilized  Pressure Reduction Techniques: frequent weight shift encouraged, weight shift assistance provided  Skin Protection: adhesive use limited, incontinence pads utilized, protective footwear used, tubing/devices free from skin contact, skin sealant/moisture barrier applied  B: Bowel Function   diarrhea   I: Indwelling Catheters   Perez necessity: [REMOVED]      Urethral Catheter 03/28/22 1848 Latex-Reason for Continuing Urinary Catheterization: Critically ill in ICU and requiring hourly monitoring of intake/output   CVC necessity: Yes  D: De-escalation Antibiotics   Yes    Family/Goals of care/Code Status   Code Status: Full Code    24H Vital Sign Range  Temp:  [97.1 °F  (36.2 °C)-98.5 °F (36.9 °C)]   Pulse:  []   Resp:  [15-74]   BP: ()/()   SpO2:  [76 %-100 %]      Shift Events   Pt had to be placed back on BIPAP for increased oxygen demand and poor ability to clear her airway. ABG with poor results and after changes made on the  BIPAP she improved and VBG was much improved (see chart). Pt is now laying in bed with the HOB elevate >45' and she makes her needs known; because of the bipap mask she is using written communication; effective. She tried on several occasions today to remove the face mask and was educated on keeping it on; she verbalized understanding.     Her /significant other is at bedside for the past 48 hours. He is very supportive and encourages her to keep the face mask on.     VSS, afebrile. No distress noted at this time. Report prepared for oncoming shift.     VS and assessment per flow sheet, patient progressing towards goals as tolerated, plan of care reviewed with patient and her /significant opther, all concerns addressed, will continue to monitor.    Mel Barroso RN  Problem: Adult Inpatient Plan of Care  Goal: Plan of Care Review  Outcome: Ongoing, Progressing  Goal: Patient-Specific Goal (Individualized)  Outcome: Ongoing, Progressing  Goal: Absence of Hospital-Acquired Illness or Injury  Outcome: Ongoing, Progressing  Goal: Optimal Comfort and Wellbeing  Outcome: Ongoing, Progressing     Problem: Infection  Goal: Absence of Infection Signs and Symptoms  Outcome: Ongoing, Progressing     Problem: Skin Injury Risk Increased  Goal: Skin Health and Integrity  Outcome: Ongoing, Progressing     Problem: Communication Impairment (Mechanical Ventilation, Invasive)  Goal: Effective Communication  Outcome: Ongoing, Progressing     Problem: Fall Injury Risk  Goal: Absence of Fall and Fall-Related Injury  Outcome: Ongoing, Progressing

## 2022-04-03 NOTE — ASSESSMENT & PLAN NOTE
Acute hepatic metabolic encephalopathy  Elevated transaminases  Elevated total bilirubin    37 y/o F with PMHx IVDU, polysubstance abuse [place] [facility] [place] [facility] for hepatology evaluation. Initially presented with concerns for acute liver failure (encephalopathy, elevated elevated elevation of LFTs, jaundice) vs pSBO vs sepsis 2/2 abdominal infection. NGT decompression of pSBO with good response, advanced to CLD. Started on broad spectrum abx and tapered. Started on lactulose and rifaximin with good response of encephalopathy, now A&O x 4 without signs of asterixis, tongue fasciculations, hallucinations. Transfer[place] [facility] [place].    - Overall etiology may be 2/2 to extensive outpatient Tylenol use (states she used to take handfuls of Tylenol for her initial abdominal pain) vs other drug interactions  - Hepatology consulted and following; appreciate assistance  - Acute hepatitis panel negative  - HIV negative  - US liver doppler / abdomen did not demonstrate appreciable biliary duct dilatation  - Lactulose 20 BID (titrate to 4-5 BMs per day)  - Rifaximin  - Ceruloplasmin wnl, Copper low  - ELIN, Anti-Sm negative  - PT/INR daily  - Trend CMP daily  - Trend CBC daily  - Monitor neurological status  - Ferritin, TIBC, Fe pending  - Anti alpha 1 trypsin pending  - Ascites studies unremarkable for SBP

## 2022-04-03 NOTE — SUBJECTIVE & OBJECTIVE
Interval History/Significant Events: AF HDS NAEON. Switched from BiPAP to Comfort Flow. No new complaints of chest pain, abdominal pain.     Review of Systems   Constitutional:  Positive for activity change and fatigue. Negative for chills, diaphoresis and fever.   HENT:  Positive for dental problem (all teeth removed previously d/t poor dentition). Negative for sneezing and sore throat.    Eyes:  Negative for pain and visual disturbance.   Respiratory:  Negative for cough, chest tightness, shortness of breath and wheezing.    Cardiovascular:  Negative for chest pain, palpitations and leg swelling.   Gastrointestinal:  Positive for abdominal distention. Negative for abdominal pain, blood in stool, constipation, diarrhea, nausea and vomiting.   Genitourinary:  Negative for dysuria, hematuria and urgency.   Musculoskeletal:  Negative for arthralgias and myalgias.   Skin:  Positive for color change (jaundice). Negative for rash.   Neurological:  Positive for weakness. Negative for tremors, syncope, light-headedness and numbness.   Psychiatric/Behavioral:  Negative for agitation and confusion.    Objective:     Vital Signs (Most Recent):  Temp: 98.5 °F (36.9 °C) (04/03/22 0700)  Pulse: (!) 115 (04/03/22 0800)  Resp: (!) 40 (04/03/22 0800)  BP: 123/83 (04/03/22 0800)  SpO2: 99 % (04/03/22 0800)   Vital Signs (24h Range):  Temp:  [97.2 °F (36.2 °C)-98.6 °F (37 °C)] 98.5 °F (36.9 °C)  Pulse:  [] 115  Resp:  [15-64] 40  SpO2:  [89 %-100 %] 99 %  BP: (112-142)/(74-98) 123/83   Weight: 23 kg (50 lb 11.3 oz)  Body mass index is 9.9 kg/m².      Intake/Output Summary (Last 24 hours) at 4/3/2022 0823  Last data filed at 4/3/2022 0601  Gross per 24 hour   Intake 218.35 ml   Output --   Net 218.35 ml       Physical Exam  Constitutional:       General: She is not in acute distress.     Appearance: She is ill-appearing. She is not toxic-appearing or diaphoretic.      Comments: Cachetic, temporal wasting, jaundiced, weak, but in  no apparent distress   HENT:      Head: Normocephalic.      Nose: Nose normal.   Eyes:      General: Scleral icterus present.      Extraocular Movements: Extraocular movements intact.      Pupils: Pupils are equal, round, and reactive to light.   Cardiovascular:      Rate and Rhythm: Regular rhythm. Tachycardia present.      Pulses: Normal pulses.      Heart sounds: Normal heart sounds. No murmur heard.  Pulmonary:      Effort: Pulmonary effort is normal. No respiratory distress.      Breath sounds: Normal breath sounds. No stridor. No wheezing or rales.      Comments: Poor cough reflex, transmitted upper airway noise  Abdominal:      General: Abdomen is flat. There is distension.      Palpations: Abdomen is soft.      Tenderness: There is no abdominal tenderness (improved). There is no guarding or rebound.   Musculoskeletal:         General: No swelling or tenderness. Normal range of motion.      Cervical back: Normal range of motion. No rigidity or tenderness.      Right lower leg: No edema.      Left lower leg: No edema.   Skin:     General: Skin is warm and dry.      Coloration: Skin is jaundiced.      Findings: Bruising and rash (petecchial rash on back) present.   Neurological:      General: No focal deficit present.      Mental Status: She is alert and oriented to person, place, and time. Mental status is at baseline.      Cranial Nerves: No cranial nerve deficit.      Sensory: No sensory deficit.      Motor: No weakness.   Psychiatric:         Mood and Affect: Mood normal.         Behavior: Behavior normal.         Thought Content: Thought content normal.       Vents:  Vent Mode: A/C (04/01/22 0705)  Ventilator Initiated: Yes (03/30/22 0141)  Set Rate: 16 BPM (04/01/22 0705)  Vt Set: 350 mL (04/01/22 0705)  Pressure Support: 0 cmH20 (04/01/22 0705)  PEEP/CPAP: 5 cmH20 (04/01/22 0705)  Oxygen Concentration (%): 50 (04/03/22 0738)  Peak Airway Pressure: 20 cmH2O (04/01/22 0705)  Plateau Pressure: 0 cmH20  (04/01/22 0705)  Total Ve: 9.02 mL (04/01/22 0705)  Negative Inspiratory Force (cm H2O): 0 (03/30/22 1552)  F/VT Ratio<105 (RSBI): (!) 65.45 (04/01/22 0705)  Lines/Drains/Airways       Peripherally Inserted Central Catheter Line  Duration             PICC Triple Lumen left basilic -- days                  Significant Labs:    CBC/Anemia Profile:  Recent Labs   Lab 04/02/22  0400 04/03/22  0302   WBC 10.24 10.44   HGB 8.4* 8.7*   HCT 26.9* 27.8*    388   * 112*   RDW 24.7* 23.2*        Chemistries:  Recent Labs   Lab 04/02/22  0400 04/02/22  1421 04/03/22 0302    138 137   K 3.6 4.0 3.7   CL 98 94* 93*   CO2 31* 30* 33*   BUN 12 13 16   CREATININE 0.4* 0.5 0.4*   CALCIUM 8.9 9.4 9.5   ALBUMIN 3.0* 3.3* 3.1*   PROT 6.3 6.9 6.8   BILITOT 6.3* 6.5* 6.4*   ALKPHOS 1,125* 1,279* 1,236*   ALT 74* 83* 79*   AST 82* 94* 74*   MG 1.9  --  1.9   PHOS 3.2  --  3.9       All pertinent labs within the past 24 hours have been reviewed.    Significant Imaging:  I have reviewed all pertinent imaging results/findings within the past 24 hours.

## 2022-04-03 NOTE — NURSING
Pt placed back on BIPAP after she started having increased 02 demand and increased RR; 02 sats 84% RR 39. BIPAP settings as follows: FI02 40%, Rate 12, I/E 12/5. MD at bedside and aware.    1100: HOB at 60', and pt laying with her eyes closed. 02 sats 93%, RR 28. Closely monitoring.

## 2022-04-03 NOTE — ASSESSMENT & PLAN NOTE
Bilateral pleural effusions  Pleural consolidation, L  Atelectasis  Mucus Plugging    No baseline home O2 per patient. Presenting with 5 L NC O2. Unclear etiology, though previous documentation suggestive of pleural effusion vs component of PNA. Unclear etiology as patient seems to be volume down on exam. No reported history of heart failure or COPD (though extensive tobacco use history). Etiology may be 2/2 to abdominal distention causing diaphragmatic pressure vs infectious etiology vs pleural effusion d/t third spacing vs infection? CT demonstrating bilateral pleural effusions and lingula consolidation; concerns for possible PNA    - 03/29 patient decompensated while on 5L > 10 HFNC > intubation; CXR demonstrating L lung atelectasis / mucus plugging  - 03/31 bronchoscopy for mucus evacuation; RCx sent, mild improvement on CXR following  - Extubated to NC on 04/01 to BiPAP. Continue weaning supplemental O2 down; challenging given poor cough reflex and copious mucus production  - RCx few GNR on gram stain;  if pseudomonas, will require extended therapy course  - S/P 5 day course of Cefepime for CAP coverage  - Aggressive pulmonary hygiene, CPT, albuterol inhaler, nebulized saline  - Continuous pulse ox  - Monitor fever curve; given lack of infectious presentation, will defer abx course now  - Lactate wnl  - Monitor BCx, RCx  - Aspiration precaution

## 2022-04-03 NOTE — ASSESSMENT & PLAN NOTE
Temporal wasting  Malnutrition    - Boost when tolerated  - Nutrition consult; TFs recs in documentation; will titrate up as tolerated

## 2022-04-03 NOTE — PLAN OF CARE
[facility] DAILY GOALS       A: Awake    RASS: Goal -    Actual - RASS (Wan Agitation-Sedation Scale): -1-->drowsy   Restraint necessity: Clinical Justification: Treatment Interference  B: Breathe   SBT: NA   C: Coordinate A & B, analgesics/sedatives   Pain: managed    SAT: NA  D: Delirium   CAM-ICU: Overall CAM-ICU: Negative  E: Early(intubated/ Progressive (non-intubated) Mobility   MOVE Screen: Fail   Activity: Activity Management: Arm raise - L1  FAS: Feeding/Nutrition   Diet order: Diet/Nutrition Received: NPO, Specialty Diet/Nutrition Received: other (see comments) (CLD)  T: Thrombus   DVT prophylaxis: VTE Required Core Measure: Pharmacological prophylaxis initiated/maintained  H: HOB Elevation   Head of Bed (HOB) Positioning: HOB elevated  U: Ulcer Prophylaxis   GI: yes  G: Glucose control   managed    S: Skin   Bathing/Skin Care: incontinence care  Device Skin Pressure Protection: absorbent pad utilized/changed, adhesive use limited, pressure points protected, positioning supports utilized  Pressure Reduction Devices: foam padding utilized  Pressure Reduction Techniques: frequent weight shift encouraged, positioned off wounds, pressure points protected, weight shift assistance provided  Skin Protection: adhesive use limited, incontinence pads utilized, protective footwear used, silicone foam dressing in place, skin sealant/moisture barrier applied, skin-to-device areas padded, skin-to-skin areas padded, transparent dressing maintained, tubing/devices free from skin contact  B: Bowel Function   diarrhea   I: Indwelling Catheters   Perez necessity: [REMOVED]      Urethral Catheter 03/28/22 3744 Latex-Reason for Continuing Urinary Catheterization: Critically ill in ICU and requiring hourly monitoring of intake/output   CVC necessity: Yes  D: De-escalation Antibiotics   No    Family/Goals of care/Code Status   Code Status: Full Code    24H Vital Sign Range  Temp:  [97.2 °F (36.2 °C)-98.6 °F (37 °C)]   Pulse:   [106-135]   Resp:  [15-64]   BP: (112-142)/(74-98)   SpO2:  [89 %-100 %]      Shift Events  No acute events throughout shift.    VS and assessment per flow sheet, patient progressing towards goals as tolerated, plan of care reviewed with Carolina, all concerns addressed, will continue to monitor.

## 2022-04-04 PROBLEM — K86.1 CHRONIC BILIARY PANCREATITIS: Status: ACTIVE | Noted: 2022-04-04

## 2022-04-04 LAB
ALBUMIN SERPL BCP-MCNC: 3.4 G/DL (ref 3.5–5.2)
ALBUMIN SERPL BCP-MCNC: 3.5 G/DL (ref 3.5–5.2)
ALLENS TEST: ABNORMAL
ALLENS TEST: ABNORMAL
ALP SERPL-CCNC: 1289 U/L (ref 55–135)
ALP SERPL-CCNC: 1366 U/L (ref 55–135)
ALT SERPL W/O P-5'-P-CCNC: 71 U/L (ref 10–44)
ALT SERPL W/O P-5'-P-CCNC: 76 U/L (ref 10–44)
ANION GAP SERPL CALC-SCNC: 12 MMOL/L (ref 8–16)
ANION GAP SERPL CALC-SCNC: 14 MMOL/L (ref 8–16)
AST SERPL-CCNC: 51 U/L (ref 10–40)
AST SERPL-CCNC: 63 U/L (ref 10–40)
BASOPHILS # BLD AUTO: 0.03 K/UL (ref 0–0.2)
BASOPHILS NFR BLD: 0.2 % (ref 0–1.9)
BILIRUB SERPL-MCNC: 5.7 MG/DL (ref 0.1–1)
BILIRUB SERPL-MCNC: 6 MG/DL (ref 0.1–1)
BUN SERPL-MCNC: 16 MG/DL (ref 6–20)
BUN SERPL-MCNC: 17 MG/DL (ref 6–20)
CALCIUM SERPL-MCNC: 10.1 MG/DL (ref 8.7–10.5)
CALCIUM SERPL-MCNC: 9.9 MG/DL (ref 8.7–10.5)
CHLORIDE SERPL-SCNC: 92 MMOL/L (ref 95–110)
CHLORIDE SERPL-SCNC: 95 MMOL/L (ref 95–110)
CO2 SERPL-SCNC: 34 MMOL/L (ref 23–29)
CO2 SERPL-SCNC: 37 MMOL/L (ref 23–29)
CREAT SERPL-MCNC: 0.4 MG/DL (ref 0.5–1.4)
CREAT SERPL-MCNC: 0.5 MG/DL (ref 0.5–1.4)
DELSYS: ABNORMAL
DELSYS: ABNORMAL
DIFFERENTIAL METHOD: ABNORMAL
EOSINOPHIL # BLD AUTO: 0 K/UL (ref 0–0.5)
EOSINOPHIL NFR BLD: 0.1 % (ref 0–8)
EP: 5
EP: 5
ERYTHROCYTE [DISTWIDTH] IN BLOOD BY AUTOMATED COUNT: 21.6 % (ref 11.5–14.5)
ERYTHROCYTE [SEDIMENTATION RATE] IN BLOOD BY WESTERGREN METHOD: 20 MM/H
ERYTHROCYTE [SEDIMENTATION RATE] IN BLOOD BY WESTERGREN METHOD: 28 MM/H
EST. GFR  (AFRICAN AMERICAN): >60 ML/MIN/1.73 M^2
EST. GFR  (AFRICAN AMERICAN): >60 ML/MIN/1.73 M^2
EST. GFR  (NON AFRICAN AMERICAN): >60 ML/MIN/1.73 M^2
EST. GFR  (NON AFRICAN AMERICAN): >60 ML/MIN/1.73 M^2
FIO2: 30
FIO2: 60
GLUCOSE SERPL-MCNC: 119 MG/DL (ref 70–110)
GLUCOSE SERPL-MCNC: 168 MG/DL (ref 70–110)
HCO3 UR-SCNC: 36.6 MMOL/L (ref 24–28)
HCO3 UR-SCNC: 39.2 MMOL/L (ref 24–28)
HCT VFR BLD AUTO: 27.9 % (ref 37–48.5)
HGB BLD-MCNC: 8.9 G/DL (ref 12–16)
IMM GRANULOCYTES # BLD AUTO: 0.12 K/UL (ref 0–0.04)
IMM GRANULOCYTES NFR BLD AUTO: 0.9 % (ref 0–0.5)
INR PPP: 1.1 (ref 0.8–1.2)
IP: 16
IP: 16
LYMPHOCYTES # BLD AUTO: 1.2 K/UL (ref 1–4.8)
LYMPHOCYTES NFR BLD: 8.8 % (ref 18–48)
MAGNESIUM SERPL-MCNC: 1.8 MG/DL (ref 1.6–2.6)
MAGNESIUM SERPL-MCNC: 2.1 MG/DL (ref 1.6–2.6)
MCH RBC QN AUTO: 35.7 PG (ref 27–31)
MCHC RBC AUTO-ENTMCNC: 31.9 G/DL (ref 32–36)
MCV RBC AUTO: 112 FL (ref 82–98)
MIN VOL: 12.1
MIN VOL: 7.3
MODE: ABNORMAL
MODE: ABNORMAL
MONOCYTES # BLD AUTO: 0.8 K/UL (ref 0.3–1)
MONOCYTES NFR BLD: 5.9 % (ref 4–15)
NEUTROPHILS # BLD AUTO: 11.1 K/UL (ref 1.8–7.7)
NEUTROPHILS NFR BLD: 84.1 % (ref 38–73)
NRBC BLD-RTO: 0 /100 WBC
PCO2 BLDA: 54.2 MMHG (ref 35–45)
PCO2 BLDA: 61.5 MMHG (ref 35–45)
PH SMN: 7.41 [PH] (ref 7.35–7.45)
PH SMN: 7.44 [PH] (ref 7.35–7.45)
PHOSPHATE SERPL-MCNC: 3.9 MG/DL (ref 2.7–4.5)
PHOSPHATE SERPL-MCNC: 4.3 MG/DL (ref 2.7–4.5)
PLATELET # BLD AUTO: 448 K/UL (ref 150–450)
PMV BLD AUTO: 11.1 FL (ref 9.2–12.9)
PO2 BLDA: 29 MMHG (ref 40–60)
PO2 BLDA: 31 MMHG (ref 40–60)
POC BE: 12 MMOL/L
POC BE: 15 MMOL/L
POC SATURATED O2: 53 % (ref 95–100)
POC SATURATED O2: 60 % (ref 95–100)
POC TCO2: 38 MMOL/L (ref 24–29)
POC TCO2: 41 MMOL/L (ref 24–29)
POTASSIUM SERPL-SCNC: 3.5 MMOL/L (ref 3.5–5.1)
POTASSIUM SERPL-SCNC: 4.4 MMOL/L (ref 3.5–5.1)
PROT SERPL-MCNC: 7.4 G/DL (ref 6–8.4)
PROT SERPL-MCNC: 7.5 G/DL (ref 6–8.4)
PROTHROMBIN TIME: 11.4 SEC (ref 9–12.5)
RBC # BLD AUTO: 2.49 M/UL (ref 4–5.4)
SAMPLE: ABNORMAL
SAMPLE: ABNORMAL
SITE: ABNORMAL
SITE: ABNORMAL
SODIUM SERPL-SCNC: 140 MMOL/L (ref 136–145)
SODIUM SERPL-SCNC: 144 MMOL/L (ref 136–145)
SP02: 90
SP02: 93
SPONT RATE: 0
SPONT RATE: 7
WBC # BLD AUTO: 13.21 K/UL (ref 3.9–12.7)

## 2022-04-04 PROCEDURE — 97530 THERAPEUTIC ACTIVITIES: CPT

## 2022-04-04 PROCEDURE — 99900035 HC TECH TIME PER 15 MIN (STAT)

## 2022-04-04 PROCEDURE — 85610 PROTHROMBIN TIME: CPT | Performed by: STUDENT IN AN ORGANIZED HEALTH CARE EDUCATION/TRAINING PROGRAM

## 2022-04-04 PROCEDURE — 94668 MNPJ CHEST WALL SBSQ: CPT

## 2022-04-04 PROCEDURE — 93005 ELECTROCARDIOGRAM TRACING: CPT

## 2022-04-04 PROCEDURE — 94640 AIRWAY INHALATION TREATMENT: CPT

## 2022-04-04 PROCEDURE — 25000242 PHARM REV CODE 250 ALT 637 W/ HCPCS

## 2022-04-04 PROCEDURE — 99223 1ST HOSP IP/OBS HIGH 75: CPT | Mod: ,,, | Performed by: INTERNAL MEDICINE

## 2022-04-04 PROCEDURE — 20000000 HC ICU ROOM

## 2022-04-04 PROCEDURE — 25000003 PHARM REV CODE 250

## 2022-04-04 PROCEDURE — 94660 CPAP INITIATION&MGMT: CPT

## 2022-04-04 PROCEDURE — 93010 EKG 12-LEAD: ICD-10-PCS | Mod: ,,, | Performed by: INTERNAL MEDICINE

## 2022-04-04 PROCEDURE — 85025 COMPLETE CBC W/AUTO DIFF WBC: CPT

## 2022-04-04 PROCEDURE — 80053 COMPREHEN METABOLIC PANEL: CPT

## 2022-04-04 PROCEDURE — 25000003 PHARM REV CODE 250: Performed by: STUDENT IN AN ORGANIZED HEALTH CARE EDUCATION/TRAINING PROGRAM

## 2022-04-04 PROCEDURE — 94761 N-INVAS EAR/PLS OXIMETRY MLT: CPT

## 2022-04-04 PROCEDURE — 97535 SELF CARE MNGMENT TRAINING: CPT

## 2022-04-04 PROCEDURE — S4991 NICOTINE PATCH NONLEGEND: HCPCS

## 2022-04-04 PROCEDURE — 93010 ELECTROCARDIOGRAM REPORT: CPT | Mod: ,,, | Performed by: INTERNAL MEDICINE

## 2022-04-04 PROCEDURE — 83735 ASSAY OF MAGNESIUM: CPT

## 2022-04-04 PROCEDURE — 82803 BLOOD GASES ANY COMBINATION: CPT

## 2022-04-04 PROCEDURE — 99223 PR INITIAL HOSPITAL CARE,LEVL III: ICD-10-PCS | Mod: ,,, | Performed by: INTERNAL MEDICINE

## 2022-04-04 PROCEDURE — 27100171 HC OXYGEN HIGH FLOW UP TO 24 HOURS

## 2022-04-04 PROCEDURE — 99291 CRITICAL CARE FIRST HOUR: CPT | Mod: ,,, | Performed by: EMERGENCY MEDICINE

## 2022-04-04 PROCEDURE — 63600175 PHARM REV CODE 636 W HCPCS

## 2022-04-04 PROCEDURE — 99291 PR CRITICAL CARE, E/M 30-74 MINUTES: ICD-10-PCS | Mod: ,,, | Performed by: EMERGENCY MEDICINE

## 2022-04-04 PROCEDURE — 84100 ASSAY OF PHOSPHORUS: CPT | Mod: 91

## 2022-04-04 PROCEDURE — 97110 THERAPEUTIC EXERCISES: CPT

## 2022-04-04 PROCEDURE — 63600175 PHARM REV CODE 636 W HCPCS: Performed by: EMERGENCY MEDICINE

## 2022-04-04 PROCEDURE — 27000221 HC OXYGEN, UP TO 24 HOURS

## 2022-04-04 RX ORDER — LACTULOSE 10 G/15ML
15 SOLUTION ORAL DAILY
Status: DISCONTINUED | OUTPATIENT
Start: 2022-04-05 | End: 2022-04-05

## 2022-04-04 RX ORDER — MEROPENEM AND SODIUM CHLORIDE 500 MG/50ML
500 INJECTION, SOLUTION INTRAVENOUS
Status: DISCONTINUED | OUTPATIENT
Start: 2022-04-04 | End: 2022-04-06

## 2022-04-04 RX ORDER — FLUCONAZOLE 40 MG/ML
150 POWDER, FOR SUSPENSION ORAL DAILY
Status: DISCONTINUED | OUTPATIENT
Start: 2022-04-04 | End: 2022-04-09

## 2022-04-04 RX ADMIN — OXYCODONE 5 MG: 5 TABLET ORAL at 03:04

## 2022-04-04 RX ADMIN — FLUCONAZOLE 150 MG: 40 POWDER, FOR SUSPENSION ORAL at 03:04

## 2022-04-04 RX ADMIN — OXYCODONE 5 MG: 5 TABLET ORAL at 08:04

## 2022-04-04 RX ADMIN — Medication 6 MG: at 10:04

## 2022-04-04 RX ADMIN — RIFAXIMIN 550 MG: 550 TABLET ORAL at 08:04

## 2022-04-04 RX ADMIN — GUAIFENESIN 200 MG: 100 SOLUTION ORAL at 09:04

## 2022-04-04 RX ADMIN — POTASSIUM BICARBONATE 50 MEQ: 978 TABLET, EFFERVESCENT ORAL at 08:04

## 2022-04-04 RX ADMIN — GUAIFENESIN 200 MG: 100 SOLUTION ORAL at 06:04

## 2022-04-04 RX ADMIN — ALBUTEROL SULFATE 2.5 MG: 2.5 SOLUTION RESPIRATORY (INHALATION) at 12:04

## 2022-04-04 RX ADMIN — NICOTINE 1 PATCH: 14 PATCH, EXTENDED RELEASE TRANSDERMAL at 08:04

## 2022-04-04 RX ADMIN — MEROPENEM AND SODIUM CHLORIDE 500 MG: 500 INJECTION, SOLUTION INTRAVENOUS at 08:04

## 2022-04-04 RX ADMIN — ALBUTEROL SULFATE 2.5 MG: 2.5 SOLUTION RESPIRATORY (INHALATION) at 01:04

## 2022-04-04 RX ADMIN — LORAZEPAM 0.5 MG: 0.5 TABLET ORAL at 09:04

## 2022-04-04 RX ADMIN — ENOXAPARIN SODIUM 30 MG: 30 INJECTION, SOLUTION INTRAVENOUS; SUBCUTANEOUS at 06:04

## 2022-04-04 RX ADMIN — FAMOTIDINE 20 MG: 20 TABLET ORAL at 08:04

## 2022-04-04 RX ADMIN — ALBUTEROL SULFATE 2.5 MG: 2.5 SOLUTION RESPIRATORY (INHALATION) at 07:04

## 2022-04-04 RX ADMIN — LACTULOSE 20 G: 20 SOLUTION ORAL at 08:04

## 2022-04-04 RX ADMIN — FUROSEMIDE 40 MG: 10 INJECTION, SOLUTION INTRAMUSCULAR; INTRAVENOUS at 11:04

## 2022-04-04 RX ADMIN — ONDANSETRON 4 MG: 2 INJECTION INTRAMUSCULAR; INTRAVENOUS at 01:04

## 2022-04-04 RX ADMIN — SODIUM CHLORIDE SOLN NEBU 3% 4 ML: 3 NEBU SOLN at 07:04

## 2022-04-04 RX ADMIN — GUAIFENESIN 200 MG: 100 SOLUTION ORAL at 02:04

## 2022-04-04 RX ADMIN — MEROPENEM AND SODIUM CHLORIDE 500 MG: 500 INJECTION, SOLUTION INTRAVENOUS at 11:04

## 2022-04-04 RX ADMIN — SODIUM CHLORIDE SOLN NEBU 3% 4 ML: 3 NEBU SOLN at 01:04

## 2022-04-04 NOTE — PLAN OF CARE
[facility] DAILY GOALS       A: Awake    RASS: Goal -    Actual - RASS (Wan Agitation-Sedation Scale): 0-->alert and calm   Restraint necessity: Clinical Justification: Treatment Interference  B: Breathe   SBT: N/A  C: Coordinate A & B, analgesics/sedatives   Pain: managed   SAT: N/A  D: Delirium   CAM-ICU: Overall CAM-ICU: Negative  E: Early(intubated/ Progressive (non-intubated) Mobility   MOVE Screen: Fail   Activity: Activity Management: Rolling - L1  FAS: Feeding/Nutrition   Diet order: Diet/Nutrition Received: NPO, Specialty Diet/Nutrition Received: other (see comments) (Isosource 1.5 continuos via NGT)  T: Thrombus   DVT prophylaxis: VTE Required Core Measure: Pharmacological prophylaxis initiated/maintained  H: HOB Elevation   Head of Bed (HOB) Positioning: HOB at 30-45 degrees  U: Ulcer Prophylaxis   GI:   G: Glucose control   managed  S: Skin   Bathing/Skin Care: back care, bath, complete, incontinence care, linen changed  Device Skin Pressure Protection: absorbent pad utilized/changed, pressure points protected  Pressure Reduction Devices: specialty bed utilized, foam padding utilized  Pressure Reduction Techniques: weight shift assistance provided  Skin Protection: tubing/devices free from skin contact, adhesive use limited  B: Bowel Function   Patient is on lactulose; has watery stools  I: Indwelling Catheters   Perez necessity: no   CVC necessity: has PICC  D: De-escalation Antibiotics   No    Family/Goals of care/Code Status   Code Status: Full Code    24H Vital Sign Range  Temp:  [97.1 °F (36.2 °C)-98.6 °F (37 °C)]   Pulse:  []   Resp:  [22-74]   BP: ()/(67-96)   SpO2:  [65 %-100 %]      Shift Events   Patient did not tolerate being weaned off of BIPAP. Her oxygen drops into the 60s when taken off BIPAP.   Patient respiratory status was tenuous throughout the night with no improvements.    VS and assessment per flow sheet, patient progressing towards goals as tolerated, plan of care  reviewed with patient, all concerns addressed, will continue to monitor.    Jada Bazzi

## 2022-04-04 NOTE — PT/OT/SLP PROGRESS
Occupational Therapy   Co-Treatment with PT    Name: Carolina Baldwin  MRN: [id number]  Admitting Diagnosis:  Liver injury       Recommendations:     Discharge Recommendations: home health PT, home health OT  Discharge Equipment Recommendations:  3-in-1 commode, shower chair  Barriers to discharge:  None    Assessment:     Carolina Baldwin is a 36 y.o. female with a medical diagnosis of Liver injury.  She presents with performance deficits affecting function are weakness, impaired endurance, impaired self care skills, impaired functional mobilty, gait instability, impaired balance, decreased upper extremity function, decreased lower extremity function, impaired cardiopulmonary response to activity. Pt tolerated session well while on Bipap. Pt performed bed mobility and transfers with min A. Pt presents with impaired endurance and impaired strength. Pt would benefit from continued skilled acute OT services in order to maximize independence and safety with ADLs and functional mobility to ensure safe return to PLOF in the least restrictive environment. OT recommending HH once pt is medically appropriate for d/c.     Rehab Prognosis:  Good; patient would benefit from acute skilled OT services to address these deficits and reach maximum level of function.       Plan:     Patient to be seen 3 x/week to address the above listed problems via self-care/home management, therapeutic activities, therapeutic exercises  · Plan of Care Expires: 04/28/22  · Plan of Care Reviewed with: patient, significant other    Subjective     Pain/Comfort:  · Pain Rating 1: 0/10  · Pain Rating Post-Intervention 1: 0/10    Objective:     Communicated with: RN prior to session.  Patient found HOB elevated with blood pressure cuff, pulse ox (continuous), telemetry, BIPAP, peripheral IV upon OT entry to room. Pt agreeable to therapy session.     General Precautions: Standard, aspiration, fall, respiratory, NPO   Orthopedic Precautions:N/A   Braces:  N/A  Respiratory Status: BiPAP     Occupational Performance:     Bed Mobility:    · Patient completed Rolling/Turning to Left with  minimum assistance  · Patient completed Scooting/Bridging with minimum assistance  · Patient completed Supine to Sit with minimum assistance  · Patient completed Sit to Supine with minimum assistance     Functional Mobility/Transfers:  · Patient completed x 2 reps Sit <> Stand Transfer from EOB with minimum assistance  with  hand-held assist   · Seated rest break required   · Oxygen sats ranging from 88-95%  · Functional Mobility: Pt completed x 4 lateral side steps towards the left with min A using HHA    EOB sitting balance:   · Pt tolerated sitting EOB for ~10 mins with SBA   · Oxygen sats ranging from 88-95% with increased time for recovery.   · Pt performed x 3 reps scapular retractions  · Pt reported fatigue while sitting EOB     Activities of Daily Living:  · Grooming: contact guard assistance pt washed face and around bipap while sitting EOB   · Toileting: total assistance for hygiene via B rolling after finding pt soiled    AMPAC 6 Click ADL: 12    Treatment & Education:   Pt educated on role of OT, POC, and goals for therapy.     POC was dicussed with patient/caregiver, who was included in its development and is in agreement with the identified goals and treatment plan.    Patient and family aware of patient's deficits and therapy progression.    Time provided for therapeutic counseling and discussion of health disposition.    Educated on importance of EOB/OOB mobility, maintaining routine, sitting up in chair, and maximizing independence with ADLs during admission    Pt completed ADLs and functional mobility for treatment session as noted above    Pt/caregiver verbalized understanding and expressed no further concerns/questions.   Updated communication board     Co-evaluation/treatment performed due to patient's multiple deficits requiring two skilled therapists to  appropriately and safely assess patient's strength and endurance while facilitating functional tasks in addition to accommodating for patient's activity tolerance.     Patient left HOB elevated with all lines intact, call button in reach, RN  notified and s/o  presentEducation:      GOALS:   Multidisciplinary Problems     Occupational Therapy Goals     Not on file                Time Tracking:     OT Date of Treatment: 04/04/22  OT Start Time: 0839  OT Stop Time: 0904  OT Total Time (min): 25 min    Billable Minutes:Self Care/Home Management 10  Therapeutic Activity 13    OT/TRUNG: OT          4/4/2022

## 2022-04-04 NOTE — ASSESSMENT & PLAN NOTE
Temporal wasting  Malnutrition    Setting of 3 year history of chronic pancreatitis without treatment and recent admission for gallstone pancreatitis s/p lap jim c/b pSBO, patient has had significantly decreased PO intake in conjunction with regular meth use.    - Nutrition consult; TFs recs in documentation; will titrate up as tolerated

## 2022-04-04 NOTE — PT/OT/SLP PROGRESS
Physical Therapy Co-Treatment    Patient Name:  Carolina Baldwin   MRN:  [id number]  Admitting Diagnosis:  Liver injury   Recent Surgery: * No surgery found *    Admit Date: 3/28/2022  Length of Stay: 7 days    Recommendations:     Discharge Recommendations:  home health PT, home health OT   Discharge Equipment Recommendations: 3-in-1 commode, shower chair   Barriers to discharge: evolving medical condition    Assessment:     Carolina Baldwin is a 36 y.o. female admitted with a medical diagnosis of Liver injury.  She presents with the following impairments/functional limitations:  weakness, impaired endurance, impaired self care skills, impaired functional mobilty, gait instability, impaired balance, decreased upper extremity function, decreased lower extremity function, pain, impaired cardiopulmonary response to activity, impaired skin. Pt tolerated session well today. PT session focused on increasing activity tolerance and cardiopulmonary endurance. Pt with all VSS and remained on BiPAP throughout session. Pt demos improvements in both activity tolerance and endurance as seen by increased time spent sitting EOB as well as able to stand and take steps on this date. Pt will continue to benefit from skilled PT services during this hospital admit to continue to improve transfer ability and efficiency as well as continue to progress pt's ambulation distance and cardiopulmonary endurance to maximize pt's functional independence and return to PLOF. After discharge pt will benefit from HHPT to further progress functional mobility and cardiopulmonary endurance as well as for home safety and energy conservation techniques.    Rehab Prognosis: Good; patient would benefit from acute skilled PT services to address these deficits and reach maximum level of function.    Recent Surgery: * No surgery found *      Plan:     During this hospitalization, patient to be seen 3 x/week to address the identified rehab impairments via gait  training, therapeutic activities, therapeutic exercises, neuromuscular re-education and progress toward the following goals:    · Plan of Care Expires:  04/28/22    Subjective     RN notified prior to session. Significant other present upon PT entrance into room.    Chief Complaint: pt with no complaints, agreeable to therapy  Patient/Family Comments/goals: get stronger, get OOB  Pain/Comfort:  · Pain Rating 1: 0/10  · Pain Rating Post-Intervention 1: 0/10      Objective:     Additional staff present: OT for cotx due to pt's multiple medical comorbidities and functional deficits req'ing two skilled therapists to appropriately progress pt's musculoskeletal strength, neuromuscular control, and endurance while taking into consideration severe medical acuity in the ICU    Patient found HOB elevated with: telemetry, blood pressure cuff, pulse ox (continuous), PICC line, NG tube, BIPAP   Cognition:   · Alert and Cooperative  · AxOx4  · Command following: Follows multistep verbal commands  · Fluency: clear/fluent  General Precautions: Standard, Cardiac aspiration, fall, respiratory, NPO   Orthopedic Precautions:N/A   Braces: N/A   Body mass index is 9.9 kg/m².  Oxygen Device: BiPAP  Vitals: /77   Pulse (!) 124   Temp 98.6 °F (37 °C) (Axillary)   Resp (!) 31   Ht 5' (1.524 m)   Wt 23 kg (50 lb 11.3 oz)   SpO2 (!) 90%   BMI 9.90 kg/m²     Outcome Measures:  AM-PAC 6 CLICK MOBILITY  Turning over in bed (including adjusting bedclothes, sheets and blankets)?: 3  Sitting down on and standing up from a chair with arms (e.g., wheelchair, bedside commode, etc.): 3  Moving from lying on back to sitting on the side of the bed?: 3  Moving to and from a bed to a chair (including a wheelchair)?: 3  Need to walk in hospital room?: 3  Climbing 3-5 steps with a railing?: 1  Basic Mobility Total Score: 16     Functional Mobility:    Bed Mobility:   · Scooting to HOB via supine bridge: maximal assistance  · Supine to Sit:  minimum assistance and with HOB elevated; from Lt side of bed  · Scooting anteriorly to EOB to have both feet planted on floor: minimum assistance  · Sit to Supine: minimum assistance; to Lt side of bed    Sitting Balance at Edge of Bed:   Static Sitting Balance: Good- : able to accept minimal resistance   Dynamic Sitting Balance: Good- : able to sit unsupported and weight shift across midline minimally   Assistance Level Required: Stand-by Assistance   Time: 10 minutes   Postural deviations noted: slouched posture and rounded shoulders   Comments: Time EOB focused primarily on tolerance to upright positioning, cardiopulmonary response and endurance for activities, and strength of postural musculature to perform dynamic sitting to prepare for tasks in the home. Pt able to accept internal and external perturbations to balance while seated EOB with appropriate trunk response to remain upright with no LOB and intermittent UE support. Able to perform reaches anterior reaches inside WM. Pt with no c/o dizziness while EOB. HR increased from 112 bpm to 122 bpm while sitting EOB. HR increased to 134 bpm when performing LAQ. SPO2 stable from 88% to 95% while EOB.    Transfers:   · Sit <> Stand Transfer: minimum assistance with hand-held assist   · Stand <> Sit Transfer: minimum assistance with hand-held assist   · [id number] from EOB    Standing Balance:   Static Standing Balance: Fair : able to stand unsupported without UE support and without LOB for 1 minute   Dynamic Standing Balance: Poor+ : able to stand with minimal assistance and reach ipsilaterally. Unable to weight shift.   Assistance Level Required: Minimal Assistance   Patient used: hand-held assist    Comments: Pt requiring Min A and HHA for dynamic balance. No complaints with dizziness/SOB.      Gait:  · Patient ambulated: 4 side steps to Lt    · Patient required: minimal assist  · Patient used:  hand-held assist   · Gait Pattern observed:  step-tp  · Gait Deviation(s): decreased step length, narrow base of support, decreased weight shift, decreased arm swing, flexed posture and decreased conor  · Impairments due to: decreased strength and decreased endurance  · all lines remained intact throughout ambulation trial  · Comments: Pt requiring Min A to stand and take steps due to weakness and generalized instability. Pt requesting to return to sitting after side steps due to fatigue    Therapeutic Exercises:    Seated LAQ: Pt performed 1x5 repetitions with facilitation for correct performance and sequencing. Exercises performed to develop and maintain pt's  strength.   o Pt reporting fatigue with noted LE shaking due to muscle fatigue after 5 reps on each extremity    Education:   Time provided for education, counseling and discussion of health disposition in regards to patient's current status   All questions answered within PT scope of practice and to patient's satisfaction   PT role in POC to address current functional deficits   Pt educated on proper body mechanics, safety techniques, and energy conservation with PT facilitation and cueing throughout session   Call nursing/pct to transfer to chair/use bathroom. Pt stated understanding.   Whiteboard updated with therapist name and pt's current mobility status documented above   Safe to perform step transfer to/from chair/bedside commode Min A and HHA w/ nursing/PCT present    Patient left HOB elevated with all lines intact, call button in reach and RN notified.    GOALS:   Multidisciplinary Problems     Physical Therapy Goals        Problem: Physical Therapy    Goal Priority Disciplines Outcome Goal Variances Interventions   Physical Therapy Goal     PT, PT/OT Ongoing, Progressing     Description: Goals to be met by: 22    Patient will increase functional independence with mobility by performin. Supine to sit with Stand-by Assistance  2. Sit to stand transfer with Contact Guard  Assistance with RW  3. Gait  x 100 feet with Contact Guard Assistance using RW.   4. Ascend/descend 3 stair with bilateral Handrails Contact Guard Assistance   5. Lower extremity exercise program x15 reps with assistance as needed to increase tolerance to activities.                    Time Tracking:     PT Received On: 04/04/22  PT Start Time: 0840     PT Stop Time: 0905  PT Total Time (min): 25 min     Billable Minutes:   · Therapeutic Activity 10 minutese and Therapeutic Exercise 13 minutes    Treatment Type: Treatment  PT/PTA: PT       Meri Millard PT, DPT  4/4/2022  Pager: [phone]

## 2022-04-04 NOTE — PT/OT/SLP PROGRESS
Speech Language Pathology      Carolina Baldwin  MRN: [id number]    Patient not seen today secondary to being on continuous BiPAP and not appropriate for PO trials at this time. ST to f/u per POC.    Mike Hernandez CCC-SLP  Speech-Language Pathology  Pager: [phone]

## 2022-04-04 NOTE — ASSESSMENT & PLAN NOTE
H/o pSBO  H/o laparoscopic cholecystectomy  H/o chronic biliary pancreatitis   Dysphagia    Patient with recent h/o pSBO at OSH,with good response to NGT decompression. Advanced to [facility]. Initially complaining of RUQ abdominal pain at OSH on initial presentation. Now with mild, diffuse abdominal pain on palpation, but none at rest. Abdomen remains distended. Hypoactive bowel sounds. Repeat KUB / CTAP demonstrating significant bowel dilatation aw/ air-fluid levels c/w SBO. NGT suction d/c on 04/01 after extubation    - Monitor BM, diet tolerance (currently with both BM and diet tolerance)  - NPO pending SLP (poor cough), ADAT following; TF per NGT in interim  - General surgery evaluated, no role for surgical intervention  - Bedside US abdomen without note for ascites or other process  - Serial abdominal exams

## 2022-04-04 NOTE — SUBJECTIVE & OBJECTIVE
Interval History/Significant Events: AF HDS NAEON. Failed comfort flow trial yesterday. Hypercapnic encephalopathy with PCO2 up to 109 2/2 poor mechanical ventilation d/t low strength reserve. Improvement with BiPAP. Continuing TF. No abdominal pain, chest pain. Reports BiPAP mask is uncomfortable.    Review of Systems   Constitutional:  Positive for activity change and fatigue. Negative for chills, diaphoresis and fever.   HENT:  Positive for dental problem (all teeth removed previously d/t poor dentition). Negative for sneezing and sore throat.    Eyes:  Negative for pain and visual disturbance.   Respiratory:  Negative for cough, chest tightness, shortness of breath and wheezing.    Cardiovascular:  Negative for chest pain, palpitations and leg swelling.   Gastrointestinal:  Positive for abdominal distention. Negative for abdominal pain, blood in stool, constipation, diarrhea, nausea and vomiting.   Genitourinary:  Negative for dysuria, hematuria and urgency.   Musculoskeletal:  Negative for arthralgias and myalgias.   Skin:  Positive for color change (jaundice). Negative for rash.   Neurological:  Positive for weakness. Negative for tremors, syncope, light-headedness and numbness.   Psychiatric/Behavioral:  Negative for agitation and confusion.    Objective:     Vital Signs (Most Recent):  Temp: 98.6 °F (37 °C) (04/04/22 0300)  Pulse: (!) 119 (04/04/22 0724)  Resp: (!) 28 (04/04/22 0724)  BP: 116/84 (04/04/22 0600)  SpO2: 99 % (04/04/22 0724)   Vital Signs (24h Range):  Temp:  [97.1 °F (36.2 °C)-98.6 °F (37 °C)] 98.6 °F (37 °C)  Pulse:  [] 119  Resp:  [22-74] 28  SpO2:  [65 %-100 %] 99 %  BP: ()/() 116/84   Weight: 23 kg (50 lb 11.3 oz)  Body mass index is 9.9 kg/m².      Intake/Output Summary (Last 24 hours) at 4/4/2022 0809  Last data filed at 4/4/2022 0600  Gross per 24 hour   Intake 381.56 ml   Output 200 ml   Net 181.56 ml       Physical Exam  Constitutional:       General: She is not in  acute distress.     Appearance: She is ill-appearing. She is not toxic-appearing or diaphoretic.      Comments: Cachetic, temporal wasting, jaundiced, weak, but in no apparent distress   HENT:      Head: Normocephalic.      Comments: BiPAP, NGT     Nose: Nose normal.   Eyes:      General: Scleral icterus present.      Extraocular Movements: Extraocular movements intact.      Pupils: Pupils are equal, round, and reactive to light.   Cardiovascular:      Rate and Rhythm: Regular rhythm. Tachycardia present.      Pulses: Normal pulses.      Heart sounds: Normal heart sounds. No murmur heard.  Pulmonary:      Effort: Pulmonary effort is normal. No respiratory distress.      Breath sounds: Normal breath sounds. No stridor. No wheezing or rales.      Comments: Poor cough reflex, transmitted upper airway noise  Abdominal:      General: Abdomen is flat. There is distension.      Palpations: Abdomen is soft.      Tenderness: There is no abdominal tenderness (improved). There is no guarding or rebound.   Musculoskeletal:         General: No swelling or tenderness. Normal range of motion.      Cervical back: Normal range of motion. No rigidity or tenderness.      Right lower leg: No edema.      Left lower leg: No edema.   Skin:     General: Skin is warm and dry.      Coloration: Skin is jaundiced.      Findings: Bruising and rash (petecchial rash on back) present.   Neurological:      General: No focal deficit present.      Mental Status: She is alert and oriented to person, place, and time. Mental status is at baseline.      Cranial Nerves: No cranial nerve deficit.      Sensory: No sensory deficit.      Motor: No weakness.   Psychiatric:         Mood and Affect: Mood normal.         Behavior: Behavior normal.         Thought Content: Thought content normal.       Vents:  Vent Mode: A/C (04/01/22 0705)  Ventilator Initiated: Yes (03/30/22 0141)  Set Rate: 16 BPM (04/01/22 0705)  Vt Set: 350 mL (04/01/22 0705)  Pressure  Support: 0 cmH20 (04/01/22 0705)  PEEP/CPAP: 5 cmH20 (04/01/22 0705)  Oxygen Concentration (%): 60 (04/04/22 0724)  Peak Airway Pressure: 20 cmH2O (04/01/22 0705)  Plateau Pressure: 0 cmH20 (04/01/22 0705)  Total Ve: 9.02 mL (04/01/22 0705)  Negative Inspiratory Force (cm H2O): 0 (03/30/22 1552)  F/VT Ratio<105 (RSBI): (!) 65.45 (04/01/22 0705)  Lines/Drains/Airways       Peripherally Inserted Central Catheter Line  Duration             PICC Triple Lumen left basilic -- days              Drain  Duration                  NG/OG Tube 04/03/22 Left nostril 1 day                  Significant Labs:    CBC/Anemia Profile:  Recent Labs   Lab 04/03/22  0302 04/03/22  0802 04/04/22  0354   WBC 10.44  --  13.21*   HGB 8.7*  --  8.9*   HCT 27.8*  --  27.9*     --  448   *  --  112*   RDW 23.2*  --  21.6*   IRON  --  34  --    FERRITIN  --  2,104*  --         Chemistries:  Recent Labs   Lab 04/03/22  0302 04/03/22  1457 04/04/22  0354    140 140   K 3.7 5.6* 4.4   CL 93* 93* 92*   CO2 33* 34* 34*   BUN 16 16 16   CREATININE 0.4* 0.5 0.4*   CALCIUM 9.5 9.5 10.1   ALBUMIN 3.1* 3.5 3.5   PROT 6.8 7.0 7.4   BILITOT 6.4* 5.7* 6.0*   ALKPHOS 1,236* 1,343* 1,366*   ALT 79* 85* 76*   AST 74* 73* 63*   MG 1.9 2.9* 2.1   PHOS 3.9 6.1* 3.9       All pertinent labs within the past 24 hours have been reviewed.    Significant Imaging:  I have reviewed all pertinent imaging results/findings within the past 24 hours.

## 2022-04-04 NOTE — CONSULTS
[facility] - Cardiac Medical ICU  Infectious Disease  Consult Note    Patient Name: Carolina Baldwin  MRN: [id number]  Admission Date: 3/28/2022  Hospital Length of Stay: 7 days  Attending Physician: Aguilar Ferreira MD  Primary Care Provider: Primary Doctor No     Isolation Status: No active isolations    Patient information was obtained from spouse/SO, ER records and primary team.      Inpatient consult to Infectious Diseases  Consult performed by: Micky Orona DO  Consult ordered by: Connor M Gillies, MD        Assessment/Plan:     Suspected ventilator-associated pneumonia  Esophageal yeast infection   Malnutrition   35 yo female with history of polysubstance abuse including IVDU now on suboxone transferred from OSH to [facility] [place] on 3/28 due to SBO and suspected acute liver failure requiring hepatology evaluation. Patient intubated prior to transfer and extubated on 4/1. Sputum culture from 3/31 collected while on ventilator growing GNR, suspicious for VAP. Patient has documented penicillin allergy but did tolerate cefepime prior to transfer. Blood cultures unrevealing. Ascitic fluid from 3/29 sterile. Paracentesis did not yield fluid suspicious for SBP based on cell differential. Bronchoscopy performed 3/31 relieved mucus plug but no BAL specimens were collected. Of note, transfer team documented esophageal yeast in their report that was noted during GI evaluation at OSH. Primary team to investigate further. GI evaluated patient and recommended various labs to help diagnose etiology of acute cholestatic liver injury. HIV negative.     Recommendations:  --Continue broad spectrum IV meropenem pending speciation and susceptibility profile of GNR isolated from sputum culture   --Given outside report of esophageal yeast infection, will start fluconazole 150 mg via NG tube   --Monitor white count and fever curve       Thank you for your consult. I will follow-up with patient. Please contact us if you have any additional  questions.    Micky Orona DO  Infectious Disease  [facility] - Cardiac Medical ICU    Subjective:     Principal Problem: Liver injury    HPI: This is a 35 yo female with history of IVDU on suboxone, use of meth, opioids, and tobacco, and gastric ulcers who presented to OSH due to jaundice, AMS, and evaluation for partial SBO. Became increasingly hypoxic and was started on broad spectrum antibiotics which were then discontinued. Found to have elevated ammonia levels and given lactulose and rifaximin. CT a/p with contrast revealed markedly dilated fluid/contrast and air filled loops of proximal small bowel concerning for SBO. Also reported small bilateral pleural effusions with consolidative changes at the lung bases. Diffuse GGO. Also findings suggestive of hepatic steatosis. Transferred to [facility] [place] for hepatology evaluation. Has been tachycardic with normal BP and currently on BiPAP. Blood cultures collected 3/29 with NGTD. Sputum culture growing GNR but no Staph aureus. Started on meropenem due to shortness of breath and swelling caused by penicillin, though she did tolerate cefepime 3/2022. Use of meropenem has prompted Infectious Diseases consult.       Past Medical History:   Diagnosis Date    IVDU (intravenous drug user)     Opioid use     Polysubstance abuse     Tobacco abuse disorder        Past Surgical History:   Procedure Laterality Date     SECTION      LAPAROSCOPIC CHOLECYSTECTOMY         Review of patient's allergies indicates:   Allergen Reactions    Penicillins Shortness Of Breath and Swelling     Tolerated cefepime 3/2022       Medications:  No medications prior to admission.     Antibiotics (From admission, onward)                Start     Stop Route Frequency Ordered    22 1100  meropenem-0.9% sodium chloride 500 mg/50 mL IVPB         -- IV Every 8 hours (non-standard times) 22 1004    22 2100  rifAXIMin tablet 550 mg         -- PER NG TUBE 2 times daily 22 0978           Antifungals (From admission, onward)                None          Antivirals (From admission, onward)      None               There is no immunization history on file for this patient.    Family History       Family history is unknown by patient.          Social History     Socioeconomic History    Marital status: Unknown   Tobacco Use    Smoking status: Former Smoker     Types: Cigarettes    Smokeless tobacco: Never Used    Tobacco comment: Smoking since 12 y/o, recently quit   Substance and Sexual Activity    Alcohol use: Not Currently    Drug use: Not Currently     Types: Methamphetamines     Review of Systems   Unable to perform ROS: Acuity of condition   Objective:     Vital Signs (Most Recent):  Temp: 98.6 °F (37 °C) (04/04/22 0300)  Pulse: (!) 124 (04/04/22 1214)  Resp: (!) 31 (04/04/22 1214)  BP: 115/77 (04/04/22 1030)  SpO2: (!) 90 % (04/04/22 1214)   Vital Signs (24h Range):  Temp:  [97.1 °F (36.2 °C)-98.6 °F (37 °C)] 98.6 °F (37 °C)  Pulse:  [] 124  Resp:  [22-51] 31  SpO2:  [65 %-100 %] 90 %  BP: ()/(67-89) 115/77     Weight: 23 kg (50 lb 11.3 oz)  Body mass index is 9.9 kg/m².    Estimated Creatinine Clearance: 70.6 mL/min (A) (based on SCr of 0.4 mg/dL (L)).    Physical Exam  Constitutional:       General: She is not in acute distress.     Appearance: She is ill-appearing.      Comments: Cachectic    Eyes:      General: Scleral icterus present.   Cardiovascular:      Rate and Rhythm: Regular rhythm. Tachycardia present.      Pulses: Normal pulses.      Heart sounds: Normal heart sounds. No murmur heard.    No friction rub. No gallop.   Pulmonary:      Effort: Pulmonary effort is normal. No respiratory distress.      Breath sounds: Normal breath sounds.      Comments: Wearing BiPAP   Abdominal:      General: Abdomen is flat. Bowel sounds are normal. There is no distension.      Palpations: Abdomen is soft.      Tenderness: There is no abdominal tenderness.   Musculoskeletal:       Cervical back: Neck supple.   Skin:     General: Skin is warm and dry.      Coloration: Skin is jaundiced.      Findings: Bruising present.       Significant Labs: All pertinent labs within the past 24 hours have been reviewed.    Significant Imaging: I have reviewed all pertinent imaging results/findings within the past 24 hours.

## 2022-04-04 NOTE — SUBJECTIVE & OBJECTIVE
Past Medical History:   Diagnosis Date    IVDU (intravenous drug user)     Opioid use     Polysubstance abuse     Tobacco abuse disorder        Past Surgical History:   Procedure Laterality Date     SECTION      LAPAROSCOPIC CHOLECYSTECTOMY         Review of patient's allergies indicates:   Allergen Reactions    Penicillins Shortness Of Breath and Swelling     Tolerated cefepime 3/2022       Medications:  No medications prior to admission.     Antibiotics (From admission, onward)                Start     Stop Route Frequency Ordered    22 1100  meropenem-0.9% sodium chloride 500 mg/50 mL IVPB         -- IV Every 8 hours (non-standard times) 22 1004    22 2100  rifAXIMin tablet 550 mg         -- PER NG TUBE 2 times daily 22 0946          Antifungals (From admission, onward)                None          Antivirals (From admission, onward)      None               There is no immunization history on file for this patient.    Family History       Family history is unknown by patient.          Social History     Socioeconomic History    Marital status: Unknown   Tobacco Use    Smoking status: Former Smoker     Types: Cigarettes    Smokeless tobacco: Never Used    Tobacco comment: Smoking since 12 y/o, recently quit   Substance and Sexual Activity    Alcohol use: Not Currently    Drug use: Not Currently     Types: Methamphetamines     Review of Systems   Unable to perform ROS: Acuity of condition   Objective:     Vital Signs (Most Recent):  Temp: 98.6 °F (37 °C) (22 0300)  Pulse: (!) 124 (22 1214)  Resp: (!) 31 (22 1214)  BP: 115/77 (22 1030)  SpO2: (!) 90 % (22 1214)   Vital Signs (24h Range):  Temp:  [97.1 °F (36.2 °C)-98.6 °F (37 °C)] 98.6 °F (37 °C)  Pulse:  [] 124  Resp:  [22-51] 31  SpO2:  [65 %-100 %] 90 %  BP: ()/(67-89) 115/77     Weight: 23 kg (50 lb 11.3 oz)  Body mass index is 9.9 kg/m².    Estimated Creatinine Clearance: 70.6 mL/min (A)  (based on SCr of 0.4 mg/dL (L)).    Physical Exam  Constitutional:       General: She is not in acute distress.     Appearance: She is ill-appearing.      Comments: Cachectic    Eyes:      General: Scleral icterus present.   Cardiovascular:      Rate and Rhythm: Regular rhythm. Tachycardia present.      Pulses: Normal pulses.      Heart sounds: Normal heart sounds. No murmur heard.    No friction rub. No gallop.   Pulmonary:      Effort: Pulmonary effort is normal. No respiratory distress.      Breath sounds: Normal breath sounds.      Comments: Wearing BiPAP   Abdominal:      General: Abdomen is flat. Bowel sounds are normal. There is no distension.      Palpations: Abdomen is soft.      Tenderness: There is no abdominal tenderness.   Musculoskeletal:      Cervical back: Neck supple.   Skin:     General: Skin is warm and dry.      Coloration: Skin is jaundiced.      Findings: Bruising present.       Significant Labs: All pertinent labs within the past 24 hours have been reviewed.    Significant Imaging: I have reviewed all pertinent imaging results/findings within the past 24 hours.

## 2022-04-04 NOTE — HPI
This is a 37 yo female with history of IVDU on suboxone, use of meth, opioids, and tobacco, and gastric ulcers who presented to OSH due to jaundice, AMS, and evaluation for partial SBO. Became increasingly hypoxic and was started on broad spectrum antibiotics which were then discontinued. Found to have elevated ammonia levels and given lactulose and rifaximin. CT a/p with contrast revealed markedly dilated fluid/contrast and air filled loops of proximal small bowel concerning for SBO. Also reported small bilateral pleural effusions with consolidative changes at the lung bases. Diffuse GGO. Also findings suggestive of hepatic steatosis. Transferred to [facility] for hepatology evaluation. Has been tachycardic with normal BP and currently on BiPAP. Blood cultures collected 3/29 with NGTD. Sputum culture growing GNR but no Staph aureus. Started on meropenem due to shortness of breath and swelling caused by penicillin, though she did tolerate cefepime 3/2022. Use of meropenem has prompted Infectious Diseases consult.

## 2022-04-04 NOTE — ASSESSMENT & PLAN NOTE
Bilateral pleural effusions  Pleural consolidation, L  Atelectasis  Mucus Plugging    No baseline home O2 per patient. Presenting with 5 L NC O2. Unclear etiology, though previous documentation suggestive of pleural effusion vs component of PNA. Unclear etiology as patient seems to be volume down on exam. No reported history of heart failure or COPD (though extensive tobacco use history). Etiology may be 2/2 to abdominal distention causing diaphragmatic pressure vs infectious etiology vs pleural effusion d/t third spacing vs infection? CT demonstrating bilateral pleural effusions and lingula consolidation; concerns for possible PNA. 03/29 patient decompensated while on 5L > 10 HFNC > intubation; CXR demonstrating L lung atelectasis / mucus plugging. 03/31 bronchoscopy for mucus evacuation; RCx sent, mild improvement on CXR following. Extubated to NC on 04/01 to BiPAP. Continue weaning supplemental O2 down; challenging given poor cough reflex and copious mucus production.    - Oxygen requirement is largely 2/2 poor mechanical ventilation d/t deconditioning with component of mucus plugging d/t poor cough reflex  - Repeat CXR demonstrates relative increased lung volumes, resolving atelectasis  - Trial alternating BiPAP and NC/HF, BiPAP qhs  - Encourage PT/OT for further strengthening  - RCx few GNR on gram stain, pending speciation  - Meropenem for VAP coverage  - Aggressive pulmonary hygiene, CPT, albuterol inhaler, nebulized saline, cough assist  - Continuous pulse ox  - Monitor fever curve; given lack of infectious presentation, will defer abx course now  - Lactate wnl  - Monitor BCx, RCx  - Aspiration precaution

## 2022-04-04 NOTE — PLAN OF CARE
[facility] - Card[facility] Medical [facility]  Discharge Reassessment    Primary Care Provider: Primary Doctor No    Expected Discharge Date: 4/7/2022     Patient not medically ready for discharge per MD.    Reassessment (most recent)     Discharge Reassessment - 04/04/22 1020        Discharge Reassessment    Assessment Type Discharge Planning Reassessment     Did the patient's condition or plan change since previous assessment? No     Discharge Plan A Home     Discharge Plan B Home with family     Discharge Barriers Identified Underinsured     Why the patient remains in the hospital Requires continued medical care        Post-Acute Status    Discharge Delays None known at this time               Tosin Whitten RN     [phone]

## 2022-04-04 NOTE — ASSESSMENT & PLAN NOTE
Acute hepatic metabolic encephalopathy  Elevated transaminases  Elevated total bilirubin    35 y/o F with PMHx IVDU, polysubstance abuse presenting to [facility] as transfer from [facility] for hepatology evaluation. Initially presented with concerns for acute liver failure (encephalopathy, elevated elevated elevation of LFTs, jaundice) vs pSBO vs sepsis 2/2 abdominal infection. NGT decompression of pSBO with good response, advanced to CLD. Started on broad spectrum abx and tapered. Started on lactulose and rifaximin with good response of encephalopathy, now A&O x 4 without signs of asterixis, tongue fasciculations, hallucinations. Transferred to [facility] for further hepatological evaluation.    - Overall etiology may be 2/2 to extensive outpatient Tylenol use (states she used to take handfuls of Tylenol for her initial abdominal pain) vs other drug interactions?  - Per hepatology recommendations, MRCP and possible liver biopsy pending  - Hepatology consulted and following; appreciate assistance  - Acute hepatitis panel negative  - HIV negative  - US liver doppler / abdomen did not demonstrate appreciable biliary duct dilatation  - Lactulose 20 BID (titrate to 4-5 BMs per day)  - Rifaximin  - Ceruloplasmin wnl, Copper low  - ELIN, Anti-Sm negative  - PT/INR daily  - Trend CMP daily  - Trend CBC daily  - Monitor neurological status  - Ferritin, TIBC, Fe pending  - Anti alpha 1 trypsin pending  - Ascites studies unremarkable for SBP

## 2022-04-04 NOTE — ASSESSMENT & PLAN NOTE
Patient family reports that she has had 3+ year history of abdominal pain secondary that they believe is due to her pancreas. Since then, she has had decreasing PO intake and loss of weight due to abdominal discomfort. Family states that patient did not regularly seek treatment for this issue, but used Tylenol for pain. Recently admitted in 03/2022 for gallstone pancreatitis at [facility], s/p lap cholecystectomy, c/b pSBO.    - See Failure to Thrive A/P

## 2022-04-04 NOTE — ASSESSMENT & PLAN NOTE
Patient presenting with BNP 1200+, Repeat TTE 03/29, EF 35-40%, diastolic dysfunction. PAP 34 mmHg. No WMA per echo. Initial CXR demonstrated concerns for congestion vs effusion.    - Clinically euvolemic now  - Lasix 40 qd  - Strict I/Os  - GDMT at future time as tolerated

## 2022-04-04 NOTE — ASSESSMENT & PLAN NOTE
Suspected ventilator-associated pneumonia  Esophageal yeast infection   Malnutrition   37 yo female with history of polysubstance abuse including IVDU now on suboxone transferred from OSH to [facility] on 3/28 due to SBO and suspected acute liver failure requiring hepatology evaluation. Patient intubated prior to transfer and extubated on 4/1. Sputum culture from 3/31 collected while on ventilator growing GNR, suspicious for VAP. Patient has documented penicillin allergy but did tolerate cefepime prior to transfer. Blood cultures unrevealing. Ascitic fluid from 3/29 sterile. Paracentesis did not yield fluid suspicious for SBP based on cell differential. Bronchoscopy performed 3/31 relieved mucus plug but no BAL specimens were collected. Of note, transfer team documented esophageal yeast in their report that was noted during GI evaluation at OSH. Primary team to investigate further. GI evaluated patient and recommended various labs to help diagnose etiology of acute cholestatic liver injury. HIV negative.     Recommendations:  --Continue broad spectrum IV meropenem pending speciation and susceptibility profile of GNR isolated from sputum culture   --Given outside report of esophageal yeast infection, will start fluconazole 150 mg via NG tube   --Monitor white count and fever curve

## 2022-04-04 NOTE — NURSING
Pt was on high-flow nasal cannula for a few hours with no s/s of distress or c/o dyspnea. She suddenly became unresponsive and her saturations began to drop. I informed the team across the byrd so they could see what was happening. Her sats dropped to 60% before increasing. She appeared to be post-ictal. I placed her back on bipap and her saturations improved. RT notified and he confirmed proper bipap placement and settings.

## 2022-04-04 NOTE — PROGRESS NOTES
[facility] - Cardiac Medical ICU  Critical Care Medicine  Progress Note    Patient Name: Carolina Baldwin  MRN: [id number]  Admission Date: 3/28/2022  Hospital Length of Stay: 7 days  Code Status: Full Code  Attending Provider: Aguilar Ferreira MD  Primary Care Provider: Primary Doctor No   Principal Problem: Liver injury    Subjective:     HPI:  37 y/o F with PMHx IVDU on suboxone, polysubstance use (meth, opioids, tobacco), gastric ulcers initially presented to OSH for increasing jaundice, lethargy, AMS, evaluation for pSBO, increased O2 requirement (3-4 L, no baseline home O2). Was started on broad spectrum Abx at OSH and eventually discontinued. Initially presented oriented x 2 and severe jaundice, elevated ammonia, but normal LFTs? Started on lactulose and rifaximin. NGT decompression at OSH for SBO with good response, advanced to CLD. [place] [facility] [place].     On presentation to [facility], patient is overtly jaundiced but with intact mentation (a&o x4). Complains of lower back pain that she experienced from the long transport from OSH to [facility]. Also complains of increasing hunger and thirst (good appetite). Questionable historian, but understands the circumstance, stating she knows she is having new liver problems and is here for the hepatology team to evaluate her. Patient reports having good bowel movements and appropriate urinary voids. Specifically denies chest pain, increased work of breathing, increasing/worsening abdominal pain, fever/chills, nausea, vomiting, constipation, diarrhea, dysuria, suprapubic pain, recent IVDU (currently on suboxone), hallucinations (auditory and visual), tremors, seizures, loss of consciousness, headache, sensory/strength changes, abnormal bleeding.    Patient is from [place], [place]. Lives with boyfriend and 5 children. Of note, she calls her boyfriend her  but is not legally ; her closest relative is her sister. Substantial IVDU history (meth),  non-injectable opioid abuse. Recently stopped smoking history (smoked since 12 y/o). On suboxone per OSH report and per patient. Does not know if any active HIV, hepatitis infection. Family history of CAD, ACS, and cancer (unspecified).       Hospital/ICU Course:  Transferred from OSH to [facility] MICU for HLOC, hepatology evaluation, and SBO. On arrival, patient A&O x 4 with diffuse abdominal pain w/o rebound, on 5 L NC. CTAP demonstrated diffuse bowel air levels and dilatation c/w SBO. Bilateral lung effusions present with concerns for L lingula consolidation. AF VS HDS on presentation to [facility]. Bedside US without amenable ascites pocket for paracentesis. Gen Surg evaluated w/o need for surgical intervention. Started on CAP coverage for pulmonary findings. Lactulose titrated to 4-5 BM/day, rifaximin. Hepatology consulted and work up initiated. Patient desaturated on 5 L NC and subsequently stepped up to 10 L HFNC with eventual intubation. Found to be mucus plugging per CXR (L lung atelectasis with L midline shift). Levophed started during intubation, weaned off. Patient respiratory status improving. Bronch completed for mucus evacuation on 03/31. Extubated to NC with BiPAP on 04/01. Suction turned off to NGT per gen surg recs. Increased airway mucus production, poor cough reflex. SLP swallow eval; NPO, TF per NGT in interim. Unable to tolerate comfort flow trials due to poor cough reflex and inspiratory effort, continues to require BiPAP.      Interval History/Significant Events: AF HDS NAEON. Failed comfort flow trial yesterday. Hypercapnic encephalopathy with PCO2 up to 109 2/2 poor mechanical ventilation d/t low strength reserve. Improvement with BiPAP. Continuing TF. No abdominal pain, chest pain. Reports BiPAP mask is uncomfortable.    Review of Systems   Constitutional:  Positive for activity change and fatigue. Negative for chills, diaphoresis and fever.   HENT:  Positive for dental problem (all teeth removed  previously d/t poor dentition). Negative for sneezing and sore throat.    Eyes:  Negative for pain and visual disturbance.   Respiratory:  Negative for cough, chest tightness, shortness of breath and wheezing.    Cardiovascular:  Negative for chest pain, palpitations and leg swelling.   Gastrointestinal:  Positive for abdominal distention. Negative for abdominal pain, blood in stool, constipation, diarrhea, nausea and vomiting.   Genitourinary:  Negative for dysuria, hematuria and urgency.   Musculoskeletal:  Negative for arthralgias and myalgias.   Skin:  Positive for color change (jaundice). Negative for rash.   Neurological:  Positive for weakness. Negative for tremors, syncope, light-headedness and numbness.   Psychiatric/Behavioral:  Negative for agitation and confusion.    Objective:     Vital Signs (Most Recent):  Temp: 98.6 °F (37 °C) (04/04/22 0300)  Pulse: (!) 119 (04/04/22 0724)  Resp: (!) 28 (04/04/22 0724)  BP: 116/84 (04/04/22 0600)  SpO2: 99 % (04/04/22 0724)   Vital Signs (24h Range):  Temp:  [97.1 °F (36.2 °C)-98.6 °F (37 °C)] 98.6 °F (37 °C)  Pulse:  [] 119  Resp:  [22-74] 28  SpO2:  [65 %-100 %] 99 %  BP: ()/() 116/84   Weight: 23 kg (50 lb 11.3 oz)  Body mass index is 9.9 kg/m².      Intake/Output Summary (Last 24 hours) at 4/4/2022 0809  Last data filed at 4/4/2022 0600  Gross per 24 hour   Intake 381.56 ml   Output 200 ml   Net 181.56 ml       Physical Exam  Constitutional:       General: She is not in acute distress.     Appearance: She is ill-appearing. She is not toxic-appearing or diaphoretic.      Comments: Cachetic, temporal wasting, jaundiced, weak, but in no apparent distress   HENT:      Head: Normocephalic.      Comments: BiPAP, NGT     Nose: Nose normal.   Eyes:      General: Scleral icterus present.      Extraocular Movements: Extraocular movements intact.      Pupils: Pupils are equal, round, and reactive to light.   Cardiovascular:      Rate and Rhythm: Regular  rhythm. Tachycardia present.      Pulses: Normal pulses.      Heart sounds: Normal heart sounds. No murmur heard.  Pulmonary:      Effort: Pulmonary effort is normal. No respiratory distress.      Breath sounds: Normal breath sounds. No stridor. No wheezing or rales.      Comments: Poor cough reflex, transmitted upper airway noise  Abdominal:      General: Abdomen is flat. There is distension.      Palpations: Abdomen is soft.      Tenderness: There is no abdominal tenderness (improved). There is no guarding or rebound.   Musculoskeletal:         General: No swelling or tenderness. Normal range of motion.      Cervical back: Normal range of motion. No rigidity or tenderness.      Right lower leg: No edema.      Left lower leg: No edema.   Skin:     General: Skin is warm and dry.      Coloration: Skin is jaundiced.      Findings: Bruising and rash (petecchial rash on back) present.   Neurological:      General: No focal deficit present.      Mental Status: She is alert and oriented to person, place, and time. Mental status is at baseline.      Cranial Nerves: No cranial nerve deficit.      Sensory: No sensory deficit.      Motor: No weakness.   Psychiatric:         Mood and Affect: Mood normal.         Behavior: Behavior normal.         Thought Content: Thought content normal.       Vents:  Vent Mode: A/C (04/01/22 0705)  Ventilator Initiated: Yes (03/30/22 0141)  Set Rate: 16 BPM (04/01/22 0705)  Vt Set: 350 mL (04/01/22 0705)  Pressure Support: 0 cmH20 (04/01/22 0705)  PEEP/CPAP: 5 cmH20 (04/01/22 0705)  Oxygen Concentration (%): 60 (04/04/22 0724)  Peak Airway Pressure: 20 cmH2O (04/01/22 0705)  Plateau Pressure: 0 cmH20 (04/01/22 0705)  Total Ve: 9.02 mL (04/01/22 0705)  Negative Inspiratory Force (cm H2O): 0 (03/30/22 1552)  F/VT Ratio<105 (RSBI): (!) 65.45 (04/01/22 0705)  Lines/Drains/Airways       Peripherally Inserted Central Catheter Line  Duration             PICC Triple Lumen left basilic -- days               Drain  Duration                  NG/OG Tube 04/03/22 Left nostril 1 day                  Significant Labs:    CBC/Anemia Profile:  Recent Labs   Lab 04/03/22  0302 04/03/22  0802 04/04/22  0354   WBC 10.44  --  13.21*   HGB 8.7*  --  8.9*   HCT 27.8*  --  27.9*     --  448   *  --  112*   RDW 23.2*  --  21.6*   IRON  --  34  --    FERRITIN  --  2,104*  --         Chemistries:  Recent Labs   Lab 04/03/22  0302 04/03/22  1457 04/04/22  0354    140 140   K 3.7 5.6* 4.4   CL 93* 93* 92*   CO2 33* 34* 34*   BUN 16 16 16   CREATININE 0.4* 0.5 0.4*   CALCIUM 9.5 9.5 10.1   ALBUMIN 3.1* 3.5 3.5   PROT 6.8 7.0 7.4   BILITOT 6.4* 5.7* 6.0*   ALKPHOS 1,236* 1,343* 1,366*   ALT 79* 85* 76*   AST 74* 73* 63*   MG 1.9 2.9* 2.1   PHOS 3.9 6.1* 3.9       All pertinent labs within the past 24 hours have been reviewed.    Significant Imaging:  I have reviewed all pertinent imaging results/findings within the past 24 hours.      ABG  Recent Labs   Lab 04/04/22  0832   PH 7.438   PO2 31*   PCO2 54.2*   HCO3 36.6*   BE 12     Assessment/Plan:     Psychiatric  Polysubstance abuse  Opioid abuse  Tobacco abuse  Methamphetamine abuse    Noted to be on suboxone through suboxone clinic. Unclear historical path.    - Addiction Psych consult for suboxone; per their report, patient is not on suboxone regularly and not interested at this time  - Urine toxicology negative at this time  - HIV negative  - Hep panel negative    Pulmonary  Acute hypoxemic respiratory failure  Bilateral pleural effusions  Pleural consolidation, L  Atelectasis  Mucus Plugging    No baseline home O2 per patient. Presenting with 5 L NC O2. Unclear etiology, though previous documentation suggestive of pleural effusion vs component of PNA. Unclear etiology as patient seems to be volume down on exam. No reported history of heart failure or COPD (though extensive tobacco use history). Etiology may be 2/2 to abdominal distention causing diaphragmatic  pressure vs infectious etiology vs pleural effusion d/t third spacing vs infection? CT demonstrating bilateral pleural effusions and lingula consolidation; concerns for possible PNA. 03/29 patient decompensated while on 5L > 10 HFNC > intubation; CXR demonstrating L lung atelectasis / mucus plugging. 03/31 bronchoscopy for mucus evacuation; RCx sent, mild improvement on CXR following. Extubated to NC on 04/01 to BiPAP. Continue weaning supplemental O2 down; challenging given poor cough reflex and copious mucus production.    - Oxygen requirement is largely 2/2 poor mechanical ventilation d/t deconditioning with component of mucus plugging d/t poor cough reflex  - Repeat CXR demonstrates relative increased lung volumes, resolving atelectasis  - Trial alternating BiPAP and NC/HF, BiPAP qhs  - Encourage PT/OT for further strengthening  - RCx few GNR on gram stain, pending speciation  - Meropenem for VAP coverage  - Aggressive pulmonary hygiene, CPT, albuterol inhaler, nebulized saline, cough assist  - Continuous pulse ox  - Monitor fever curve; given lack of infectious presentation, will defer abx course now  - Lactate wnl  - Monitor BCx, RCx  - Aspiration precaution    Cardiac/Vascular  CHF (congestive heart failure)  Patient presenting with BNP 1200+, Repeat TTE 03/29, EF 35-40%, diastolic dysfunction. PAP 34 mmHg. No WMA per echo. Initial CXR demonstrated concerns for congestion vs effusion.    - Clinically euvolemic now  - Lasix 40 qd  - Strict I/Os  - GDMT at future time as tolerated     Paroxysmal tachycardia  Presenting with tachycardia to 120s. Noted in OSH previous progress note and was tempered with BB. Unclear etiology, potentially volume down driving sinus tachycardia as patient has been on NGT suction d/t pSBO with limited PO intake. Also appears to be extremely malnourished. In setting of potential acute pulmonary process, may be related to acute infection vs mucus plugging / atelectasis    - Repeat EKG  demonstrating sinus tachycardia and abnormal R wave progression; no previous EKG for comparison  - Cardiac telemetry  - Electrolyte trend, K > 4, Mg > 2  - Will hold trial of IVF bolus until further evaluation regarding volume status (BNP elevated 1000+ on admission)  - TTE revealing EF 35-40%, PAP 34,       GI  Chronic biliary pancreatitis  Patient family reports that she has had 3+ year history of abdominal pain secondary that they believe is due to her pancreas. Since then, she has had decreasing PO intake and loss of weight due to abdominal discomfort. Family states that patient did not regularly seek treatment for this issue, but used Tylenol for pain. Recently admitted in 03/2022 for gallstone pancreatitis at [facility], s/p lap cholecystectomy, c/b pSBO.    - See Failure to Thrive A/P    Generalized abdominal pain  H/o pSBO  H/o laparoscopic cholecystectomy  H/o chronic biliary pancreatitis   Dysphagia    Patient with recent h/o pSBO at OSH,with good response to NGT decompression. Advanced to CLD. Initially complaining of RUQ abdominal pain at OSH on initial presentation. Now with mild, diffuse abdominal pain on palpation, but none at rest. Abdomen remains distended. Hypoactive bowel sounds. Repeat KUB / CTAP demonstrating significant bowel dilatation aw/ air-fluid levels c/w SBO. NGT suction d/c on 04/01 after extubation    - Monitor BM, diet tolerance (currently with both BM and diet tolerance)  - NPO pending SLP (poor cough), ADAT following; TF per NGT in interim  - General surgery evaluated, no role for surgical intervention  - Bedside US abdomen without note for ascites or other process  - Serial abdominal exams    Orthopedic  * Liver injury  Acute hepatic metabolic encephalopathy  Elevated transaminases  Elevated total bilirubin    35 y/o F with PMHx IVDU, polysubstance abuse [place] [facility] [place] from [facility] for hepatology evaluation. Initially presented with concerns for  acute liver failure (encephalopathy, elevated elevated elevation of LFTs, jaundice) vs pSBO vs sepsis 2/2 abdominal infection. NGT decompression of pSBO with good response, advanced to CLD. Started on broad spectrum abx and tapered. Started on lactulose and rifaximin with good response of encephalopathy, now A&O x 4 without signs of asterixis, tongue fasciculations, hallucinations. Transferred to [facility] for [place] hepatological evaluation.    - Overall etiology may be 2/2 to extensive outpatient Tylenol use (states she used to take handfuls of Tylenol for her initial abdominal pain) vs other drug interactions?  - Per hepatology recommendations, MRCP and possible liver biopsy pending  - Hepatology consulted and following; appreciate assistance  - Acute hepatitis panel negative  - HIV negative  - US liver doppler / abdomen did not demonstrate appreciable biliary duct dilatation  - Lactulose 20 BID (titrate to 4-5 BMs per day)  - Rifaximin  - Ceruloplasmin wnl, Copper low  - ELIN, Anti-Sm negative  - PT/INR daily  - Trend CMP daily  - Trend CBC daily  - Monitor neurological status  - Ferritin, TIBC, Fe pending  - Anti alpha 1 trypsin pending  - Ascites studies unremarkable for SBP    Other  Failure to thrive in adult  Temporal wasting  Malnutrition    Setting of 3 year history of chronic pancreatitis without treatment and recent admission for gallstone pancreatitis s/p lap jim c/b pSBO, patient has had significantly decreased PO intake in conjunction with regular meth use.    - Nutrition consult; TFs recs in documentation; will titrate up as tolerated     Critical Care Daily Checklist:    A: Awake: RASS Goal/Actual Goal:    Actual: Wan Agitation Sedation Scale (RASS): Alert and calm   B: Spontaneous Breathing Trial Performed? Spon. Breathing Trial Initiated?: Initiated (04/01/22 1018)   C: SAT & SBT Coordinated?  N/A                      D: Delirium: CAM-ICU Overall CAM-ICU: Negative   E: Early Mobility Performed?  Yes   F: Feeding Goal: Goals: Meet % EEN, EPN by RD f/u date  Status: Nutrition Goal Status: new   Current Diet Order   Procedures    Diet NPO      AS: Analgesia/Sedation N/A   T: Thromboembolic Prophylaxis Lovenox   H: HOB > 300 Yes   U: Stress Ulcer Prophylaxis (if needed) Pepcid   G: Glucose Control SSI   B: Bowel Function Stool Occurrence: 1   I: Indwelling Catheter (Lines & Perez) Necessity PICC   D: De-escalation of Antimicrobials/Pharmacotherapies Meropenem VAP    Plan for the day/ETD Alternate BiPAP vs CF    Code Status:  Family/Goals of Care: Full Code         Critical secondary to Patient has a condition that poses threat to life and bodily function: Severe Respiratory Distress      Critical care was time spent personally by me on the following activities: 40 min development of treatment plan with patient or surrogate and bedside caregivers, discussions with consultants, evaluation of patient's response to treatment, examination of patient, ordering and performing treatments and interventions, ordering and review of laboratory studies, ordering and review of radiographic studies, pulse oximetry, re-evaluation of patient's condition. This critical care time did not overlap with that of any other provider or involve time for any procedures.     Geoffrey Gloria MD  Critical Care Medicine  [facility] - Cardiac Medical ICU

## 2022-04-05 PROBLEM — B37.81 ESOPHAGEAL CANDIDIASIS: Status: ACTIVE | Noted: 2022-04-05

## 2022-04-05 PROBLEM — J18.9 PNEUMONIA OF BOTH LOWER LOBES DUE TO INFECTIOUS ORGANISM: Status: ACTIVE | Noted: 2022-04-05

## 2022-04-05 LAB
ALBUMIN SERPL BCP-MCNC: 3 G/DL (ref 3.5–5.2)
ALBUMIN SERPL BCP-MCNC: 3.1 G/DL (ref 3.5–5.2)
ALP SERPL-CCNC: 1096 U/L (ref 55–135)
ALP SERPL-CCNC: 1159 U/L (ref 55–135)
ALT SERPL W/O P-5'-P-CCNC: 55 U/L (ref 10–44)
ALT SERPL W/O P-5'-P-CCNC: 58 U/L (ref 10–44)
ANION GAP SERPL CALC-SCNC: 10 MMOL/L (ref 8–16)
ANION GAP SERPL CALC-SCNC: 12 MMOL/L (ref 8–16)
AST SERPL-CCNC: 38 U/L (ref 10–40)
AST SERPL-CCNC: 41 U/L (ref 10–40)
BASOPHILS # BLD AUTO: 0.02 K/UL (ref 0–0.2)
BASOPHILS NFR BLD: 0.1 % (ref 0–1.9)
BILIRUB SERPL-MCNC: 4.7 MG/DL (ref 0.1–1)
BILIRUB SERPL-MCNC: 5.1 MG/DL (ref 0.1–1)
BUN SERPL-MCNC: 16 MG/DL (ref 6–20)
BUN SERPL-MCNC: 19 MG/DL (ref 6–20)
CALCIUM SERPL-MCNC: 10.1 MG/DL (ref 8.7–10.5)
CALCIUM SERPL-MCNC: 9.7 MG/DL (ref 8.7–10.5)
CHLORIDE SERPL-SCNC: 90 MMOL/L (ref 95–110)
CHLORIDE SERPL-SCNC: 93 MMOL/L (ref 95–110)
CO2 SERPL-SCNC: 34 MMOL/L (ref 23–29)
CO2 SERPL-SCNC: 37 MMOL/L (ref 23–29)
CREAT SERPL-MCNC: 0.4 MG/DL (ref 0.5–1.4)
CREAT SERPL-MCNC: 0.4 MG/DL (ref 0.5–1.4)
DIFFERENTIAL METHOD: ABNORMAL
EOSINOPHIL # BLD AUTO: 0 K/UL (ref 0–0.5)
EOSINOPHIL NFR BLD: 0.1 % (ref 0–8)
ERYTHROCYTE [DISTWIDTH] IN BLOOD BY AUTOMATED COUNT: 21.2 % (ref 11.5–14.5)
EST. GFR  (AFRICAN AMERICAN): >60 ML/MIN/1.73 M^2
EST. GFR  (AFRICAN AMERICAN): >60 ML/MIN/1.73 M^2
EST. GFR  (NON AFRICAN AMERICAN): >60 ML/MIN/1.73 M^2
EST. GFR  (NON AFRICAN AMERICAN): >60 ML/MIN/1.73 M^2
GLUCOSE SERPL-MCNC: 124 MG/DL (ref 70–110)
GLUCOSE SERPL-MCNC: 137 MG/DL (ref 70–110)
HCT VFR BLD AUTO: 26.1 % (ref 37–48.5)
HGB BLD-MCNC: 8.1 G/DL (ref 12–16)
IMM GRANULOCYTES # BLD AUTO: 0.1 K/UL (ref 0–0.04)
IMM GRANULOCYTES NFR BLD AUTO: 0.7 % (ref 0–0.5)
INR PPP: 1.1 (ref 0.8–1.2)
LYMPHOCYTES # BLD AUTO: 1.6 K/UL (ref 1–4.8)
LYMPHOCYTES NFR BLD: 11.2 % (ref 18–48)
MAGNESIUM SERPL-MCNC: 1.8 MG/DL (ref 1.6–2.6)
MAGNESIUM SERPL-MCNC: 1.9 MG/DL (ref 1.6–2.6)
MCH RBC QN AUTO: 34.6 PG (ref 27–31)
MCHC RBC AUTO-ENTMCNC: 31 G/DL (ref 32–36)
MCV RBC AUTO: 112 FL (ref 82–98)
MONOCYTES # BLD AUTO: 0.7 K/UL (ref 0.3–1)
MONOCYTES NFR BLD: 5.3 % (ref 4–15)
NEUTROPHILS # BLD AUTO: 11.4 K/UL (ref 1.8–7.7)
NEUTROPHILS NFR BLD: 82.6 % (ref 38–73)
NRBC BLD-RTO: 0 /100 WBC
PHOSPHATE SERPL-MCNC: 3.5 MG/DL (ref 2.7–4.5)
PHOSPHATE SERPL-MCNC: 3.9 MG/DL (ref 2.7–4.5)
PLATELET # BLD AUTO: 424 K/UL (ref 150–450)
PMV BLD AUTO: 10.8 FL (ref 9.2–12.9)
POTASSIUM SERPL-SCNC: 4 MMOL/L (ref 3.5–5.1)
POTASSIUM SERPL-SCNC: 4.4 MMOL/L (ref 3.5–5.1)
PROT SERPL-MCNC: 6.9 G/DL (ref 6–8.4)
PROT SERPL-MCNC: 7 G/DL (ref 6–8.4)
PROTHROMBIN TIME: 11.1 SEC (ref 9–12.5)
RBC # BLD AUTO: 2.34 M/UL (ref 4–5.4)
SODIUM SERPL-SCNC: 136 MMOL/L (ref 136–145)
SODIUM SERPL-SCNC: 140 MMOL/L (ref 136–145)
WBC # BLD AUTO: 13.87 K/UL (ref 3.9–12.7)

## 2022-04-05 PROCEDURE — 85025 COMPLETE CBC W/AUTO DIFF WBC: CPT

## 2022-04-05 PROCEDURE — 99291 PR CRITICAL CARE, E/M 30-74 MINUTES: ICD-10-PCS | Mod: ,,, | Performed by: EMERGENCY MEDICINE

## 2022-04-05 PROCEDURE — 25000003 PHARM REV CODE 250

## 2022-04-05 PROCEDURE — 83735 ASSAY OF MAGNESIUM: CPT | Mod: 91

## 2022-04-05 PROCEDURE — 85610 PROTHROMBIN TIME: CPT | Performed by: STUDENT IN AN ORGANIZED HEALTH CARE EDUCATION/TRAINING PROGRAM

## 2022-04-05 PROCEDURE — 99900035 HC TECH TIME PER 15 MIN (STAT)

## 2022-04-05 PROCEDURE — 99233 SBSQ HOSP IP/OBS HIGH 50: CPT | Mod: ,,, | Performed by: INTERNAL MEDICINE

## 2022-04-05 PROCEDURE — 25000003 PHARM REV CODE 250: Performed by: STUDENT IN AN ORGANIZED HEALTH CARE EDUCATION/TRAINING PROGRAM

## 2022-04-05 PROCEDURE — 63600175 PHARM REV CODE 636 W HCPCS

## 2022-04-05 PROCEDURE — 27000221 HC OXYGEN, UP TO 24 HOURS

## 2022-04-05 PROCEDURE — 94660 CPAP INITIATION&MGMT: CPT

## 2022-04-05 PROCEDURE — 92526 ORAL FUNCTION THERAPY: CPT

## 2022-04-05 PROCEDURE — 84100 ASSAY OF PHOSPHORUS: CPT | Mod: 91

## 2022-04-05 PROCEDURE — 25000242 PHARM REV CODE 250 ALT 637 W/ HCPCS

## 2022-04-05 PROCEDURE — C1751 CATH, INF, PER/CENT/MIDLINE: HCPCS

## 2022-04-05 PROCEDURE — 63600175 PHARM REV CODE 636 W HCPCS: Performed by: EMERGENCY MEDICINE

## 2022-04-05 PROCEDURE — 99291 CRITICAL CARE FIRST HOUR: CPT | Mod: ,,, | Performed by: EMERGENCY MEDICINE

## 2022-04-05 PROCEDURE — 20000000 HC ICU ROOM

## 2022-04-05 PROCEDURE — 94640 AIRWAY INHALATION TREATMENT: CPT

## 2022-04-05 PROCEDURE — 99233 PR SUBSEQUENT HOSPITAL CARE,LEVL III: ICD-10-PCS | Mod: ,,, | Performed by: INTERNAL MEDICINE

## 2022-04-05 PROCEDURE — 97530 THERAPEUTIC ACTIVITIES: CPT

## 2022-04-05 PROCEDURE — 94761 N-INVAS EAR/PLS OXIMETRY MLT: CPT

## 2022-04-05 PROCEDURE — S4991 NICOTINE PATCH NONLEGEND: HCPCS

## 2022-04-05 PROCEDURE — 80053 COMPREHEN METABOLIC PANEL: CPT | Mod: 91

## 2022-04-05 RX ORDER — IBUPROFEN 200 MG
1 TABLET ORAL DAILY
COMMUNITY
Start: 2022-03-14

## 2022-04-05 RX ORDER — HEPARIN SODIUM 5000 [USP'U]/ML
5000 INJECTION, SOLUTION INTRAVENOUS; SUBCUTANEOUS EVERY 12 HOURS
Status: DISCONTINUED | OUTPATIENT
Start: 2022-04-05 | End: 2022-04-10 | Stop reason: HOSPADM

## 2022-04-05 RX ORDER — IBUPROFEN 400 MG/1
400 TABLET ORAL EVERY 6 HOURS PRN
Status: DISCONTINUED | OUTPATIENT
Start: 2022-04-05 | End: 2022-04-08

## 2022-04-05 RX ORDER — BUPRENORPHINE HYDROCHLORIDE AND NALOXONE HYDROCHLORIDE DIHYDRATE 8; 2 MG/1; MG/1
2.5 TABLET SUBLINGUAL DAILY
COMMUNITY
Start: 2022-02-03

## 2022-04-05 RX ADMIN — ALBUTEROL SULFATE 2.5 MG: 2.5 SOLUTION RESPIRATORY (INHALATION) at 12:04

## 2022-04-05 RX ADMIN — SODIUM CHLORIDE SOLN NEBU 3% 4 ML: 3 NEBU SOLN at 12:04

## 2022-04-05 RX ADMIN — OXYCODONE 5 MG: 5 TABLET ORAL at 02:04

## 2022-04-05 RX ADMIN — Medication 6 MG: at 09:04

## 2022-04-05 RX ADMIN — HEPARIN SODIUM 5000 UNITS: 5000 INJECTION INTRAVENOUS; SUBCUTANEOUS at 08:04

## 2022-04-05 RX ADMIN — GUAIFENESIN 200 MG: 100 SOLUTION ORAL at 09:04

## 2022-04-05 RX ADMIN — GUAIFENESIN 200 MG: 100 SOLUTION ORAL at 02:04

## 2022-04-05 RX ADMIN — MEROPENEM AND SODIUM CHLORIDE 500 MG: 500 INJECTION, SOLUTION INTRAVENOUS at 03:04

## 2022-04-05 RX ADMIN — MEROPENEM AND SODIUM CHLORIDE 500 MG: 500 INJECTION, SOLUTION INTRAVENOUS at 07:04

## 2022-04-05 RX ADMIN — NICOTINE 1 PATCH: 14 PATCH, EXTENDED RELEASE TRANSDERMAL at 08:04

## 2022-04-05 RX ADMIN — SODIUM CHLORIDE SOLN NEBU 3% 4 ML: 3 NEBU SOLN at 07:04

## 2022-04-05 RX ADMIN — ALBUTEROL SULFATE 2.5 MG: 2.5 SOLUTION RESPIRATORY (INHALATION) at 07:04

## 2022-04-05 RX ADMIN — MEROPENEM AND SODIUM CHLORIDE 500 MG: 500 INJECTION, SOLUTION INTRAVENOUS at 10:04

## 2022-04-05 RX ADMIN — GUAIFENESIN 200 MG: 100 SOLUTION ORAL at 05:04

## 2022-04-05 RX ADMIN — LORAZEPAM 0.5 MG: 0.5 TABLET ORAL at 08:04

## 2022-04-05 RX ADMIN — FLUCONAZOLE 150 MG: 40 POWDER, FOR SUSPENSION ORAL at 10:04

## 2022-04-05 RX ADMIN — HEPARIN SODIUM 5000 UNITS: 5000 INJECTION INTRAVENOUS; SUBCUTANEOUS at 10:04

## 2022-04-05 NOTE — PT/OT/SLP PROGRESS
Speech Language Pathology Treatment    Patient Name:  Carolina Baldwin   MRN:  [id number]  Admitting Diagnosis: Liver injury    Recommendations:                 General Recommendations:  follow up x1-2  Diet recommendations:  Regular, Liquid Diet Level: Thin   Aspiration Precautions: Standard aspiration precautions   General Precautions: Standard, aspiration, fall, respiratory, NPO  Communication strategies:  none    Subjective     Patient awake and alert. Seated in bedside chair upon per PT.     Pain/Comfort:  ·  No c/o pain    Respiratory Status: Room air    Objective:     Has the patient been evaluated by SLP for swallowing?   Yes  Keep patient NPO? No   Current Respiratory Status:        Patient seen for follow up diet assessment.  Patient tolerated thin liquids via straw sips x8 along with regular solids x3 with no overt signs of airway compromise. Patient with oral and pharyngeal phases of swallow appearing WFL. Appropriate to advance diet to regular consistencies at this time. Skilled education was provided to patient  re: diet recs, standard aspiration precautions of which to follow, and ongoing ST plan of care.    Assessment:     Carolina Baldwin is a 36 y.o. female with an SLP diagnosis of no oropharyngeal dysphagia .  SLP to follow up x1 to ensure diet tolerance.     Goals:   Multidisciplinary Problems     SLP Goals        Problem: SLP    Goal Priority Disciplines Outcome   SLP Goal     SLP Ongoing, Progressing   Description: Speech Language Pathology Goals  Goals expected to be met by 4/16    1. Pt will participate in ongoing swallow assessment to determine safest and least restrictive PO diet.   2. Pt will complete cognitive-linguistic assessment to determine therapy goals.                          Plan:     · Patient to be seen:  4 x/week   · Plan of Care expires:  05/02/22  · Plan of Care reviewed with:  patient   · SLP Follow-Up:  Yes       Discharge recommendations:   (tbd)   Barriers to Discharge:   None    Time Tracking:     SLP Treatment Date:   04/05/22  Speech Start Time:  1032  Speech Stop Time:  1040     Speech Total Time (min):  8 min    Billable Minutes: Treatment Swallowing Dysfunction 8    04/05/2022

## 2022-04-05 NOTE — SUBJECTIVE & OBJECTIVE
Interval History/Significant Events: AF HDS NAEON. Patient weaned from BiPAP to NC this AM. Speaking more; appears to have more energy. No abdominal pain, chest pain. Tolerating TF well.    Review of Systems   Constitutional:  Positive for activity change and fatigue. Negative for chills, diaphoresis and fever.   HENT:  Positive for dental problem (all teeth removed previously d/t poor dentition). Negative for sneezing and sore throat.    Eyes:  Negative for pain and visual disturbance.   Respiratory:  Negative for cough, chest tightness, shortness of breath and wheezing.    Cardiovascular:  Negative for chest pain, palpitations and leg swelling.   Gastrointestinal:  Positive for abdominal distention. Negative for abdominal pain, blood in stool, constipation, diarrhea, nausea and vomiting.   Genitourinary:  Negative for dysuria, hematuria and urgency.   Musculoskeletal:  Negative for arthralgias and myalgias.   Skin:  Positive for color change (jaundice). Negative for rash.   Neurological:  Positive for weakness. Negative for tremors, syncope, light-headedness and numbness.   Psychiatric/Behavioral:  Negative for agitation and confusion.    Objective:     Vital Signs (Most Recent):  Temp: 98.1 °F (36.7 °C) (04/05/22 0700)  Pulse: 105 (04/05/22 0800)  Resp: (!) 26 (04/05/22 0800)  BP: 114/62 (04/05/22 0800)  SpO2: 96 % (04/05/22 0800)   Vital Signs (24h Range):  Temp:  [97.8 °F (36.6 °C)-98.2 °F (36.8 °C)] 98.1 °F (36.7 °C)  Pulse:  [100-141] 105  Resp:  [16-51] 26  SpO2:  [64 %-100 %] 96 %  BP: ()/(62-98) 114/62   Weight: 21.7 kg (47 lb 13.4 oz)  Body mass index is 9.34 kg/m².      Intake/Output Summary (Last 24 hours) at 4/5/2022 0825  Last data filed at 4/5/2022 0800  Gross per 24 hour   Intake 733.36 ml   Output --   Net 733.36 ml       Physical Exam  Constitutional:       General: She is not in acute distress.     Appearance: She is ill-appearing. She is not toxic-appearing or diaphoretic.      Comments:  Cachetic, temporal wasting, jaundiced, weak, but in no apparent distress   HENT:      Head: Normocephalic.      Comments: BiPAP, NGT     Nose: Nose normal.   Eyes:      General: Scleral icterus present.      Extraocular Movements: Extraocular movements intact.      Pupils: Pupils are equal, round, and reactive to light.   Cardiovascular:      Rate and Rhythm: Regular rhythm. Tachycardia present.      Pulses: Normal pulses.      Heart sounds: Normal heart sounds. No murmur heard.  Pulmonary:      Effort: Pulmonary effort is normal. No respiratory distress.      Breath sounds: Normal breath sounds. No stridor. No wheezing or rales.      Comments: Poor cough reflex, transmitted upper airway noise  Abdominal:      General: Abdomen is flat. There is distension.      Palpations: Abdomen is soft.      Tenderness: There is no abdominal tenderness (improved). There is no guarding or rebound.   Musculoskeletal:         General: No swelling or tenderness. Normal range of motion.      Cervical back: Normal range of motion. No rigidity or tenderness.      Right lower leg: No edema.      Left lower leg: No edema.   Skin:     General: Skin is warm and dry.      Coloration: Skin is jaundiced.      Findings: Bruising and rash (petecchial rash on back) present.   Neurological:      General: No focal deficit present.      Mental Status: She is alert and oriented to person, place, and time. Mental status is at baseline.      Cranial Nerves: No cranial nerve deficit.      Sensory: No sensory deficit.      Motor: No weakness.   Psychiatric:         Mood and Affect: Mood normal.         Behavior: Behavior normal.         Thought Content: Thought content normal.       Vents:  Vent Mode: A/C (04/01/22 0705)  Ventilator Initiated: Yes (03/30/22 0141)  Set Rate: 16 BPM (04/01/22 0705)  Vt Set: 350 mL (04/01/22 0705)  Pressure Support: 0 cmH20 (04/01/22 0705)  PEEP/CPAP: 5 cmH20 (04/01/22 0705)  Oxygen Concentration (%): 45 (04/05/22  0500)  Peak Airway Pressure: 20 cmH2O (04/01/22 0705)  Plateau Pressure: 0 cmH20 (04/01/22 0705)  Total Ve: 9.02 mL (04/01/22 0705)  Negative Inspiratory Force (cm H2O): 0 (03/30/22 1552)  F/VT Ratio<105 (RSBI): (!) 65.45 (04/01/22 0705)  Lines/Drains/Airways       Peripherally Inserted Central Catheter Line  Duration             PICC Triple Lumen left basilic -- days              Drain  Duration                  NG/OG Tube 04/03/22 Left nostril 2 days         Rectal Tube 04/04/22 1430 fecal management system <1 day                  Significant Labs:    CBC/Anemia Profile:  Recent Labs   Lab 04/04/22  0354 04/05/22  0247   WBC 13.21* 13.87*   HGB 8.9* 8.1*   HCT 27.9* 26.1*    424   * 112*   RDW 21.6* 21.2*        Chemistries:  Recent Labs   Lab 04/04/22  0354 04/04/22  1551 04/05/22  0247    144 140   K 4.4 3.5 4.4   CL 92* 95 93*   CO2 34* 37* 37*   BUN 16 17 19   CREATININE 0.4* 0.5 0.4*   CALCIUM 10.1 9.9 10.1   ALBUMIN 3.5 3.4* 3.1*   PROT 7.4 7.5 7.0   BILITOT 6.0* 5.7* 5.1*   ALKPHOS 1,366* 1,289* 1,159*   ALT 76* 71* 58*   AST 63* 51* 38   MG 2.1 1.8 1.9   PHOS 3.9 4.3 3.5       All pertinent labs within the past 24 hours have been reviewed.    Significant Imaging:  I have reviewed all pertinent imaging results/findings within the past 24 hours.

## 2022-04-05 NOTE — PLAN OF CARE
Discharge Recommendation: HHPT.    1 goal met today. PT goals appropriate.    Patient is safe to perform bed <> chair transfer with CGA with nursing staff.    Please continue Progressive Mobility Protocol as appropriate.    Meri Millard, PT, DPT  2022  Pager: [phone]    Problem: Physical Therapy  Goal: Physical Therapy Goal  Description: Goals to be met by: 22    Patient will increase functional independence with mobility by performin. Supine to sit with Stand-by Assistance - not met  2. Sit to stand transfer with Contact Guard Assistance with RW - met 4/5  2a. Sit to stand transfer with supervision - not met  3. Gait  x 100 feet with Contact Guard Assistance using RW. - not met  4. Ascend/descend 3 stair with bilateral Handrails Contact Guard Assistance - not met  5. Lower extremity exercise program x15 reps with assistance as needed to increase tolerance to activities. - not met  Outcome: Ongoing, Progressing

## 2022-04-05 NOTE — ASSESSMENT & PLAN NOTE
Cholelithiasis    Patient family reports that she has had 3+ year history of abdominal pain secondary that they believe is due to her pancreas. Since then, she has had decreasing PO intake and loss of weight due to abdominal discomfort. Family states that patient did not regularly seek treatment for this issue, but used Tylenol for pain. Recently admitted in 03/2022 for gallstone pancreatitis at [facility], s/p lap cholecystectomy, c/b pSBO.    - See Failure to Thrive A/P

## 2022-04-05 NOTE — PROGRESS NOTES
[facility]  Gastroenterology  Progress Note    Patient Name: Carolina Baldwin  MRN: [id number]  Admission Date: 3/28/2022  Hospital Length of Stay: 8 days    Subjective:     HPI:   Ms Baldwin is a 37yo PMHx s/p cholecystectomy, polysubstance use d/o (meth, opiates, tobax) initially presents as transfer from OSH on 03/28 for further evaluation of acute liver injury.      Initially presented to OSH for increasing jaundice, lethargy, AMS found to have pSBO w/ increased o2 requirements. NGT placed for SBO and initially received BSABx which were discontinued. Had elevated ammonia and was receiving lactulose and xifaxan.      Since arrival VS notable for AF, HR 110s, normotensive, RR 30 on 1L NC satting 95%. CBC w/o leukocytosis, Hgb 8.2, plts 135. BMP w/ BUN/ Cr 11/ 0.4. LFTs w/ AST/ / 79, , T bili 9.3. INR 1.4. BNP 1200. CTAP w/ contrast notable w/ SBO, large volume complex abdominopelvic fluid, b/l pleural effusions, hepatic steatosis. US liver w/ doppler w/ hepatic steatosis.     Hepatitis panel, ELIN, ASMA, ceruloplasmin pending.     Primary team is diuresing w/ lasix and surgery is following for SBO. Receiving lactulose and xifaxan as well.     Interval History:  Extubated and off pressors but unable to get off BiPAP. Bronch cultures growing GNR. On meropenem. Liver enzymes stable/ downtrending but still high ALP.    No current facility-administered medications on file prior to encounter.     Current Outpatient Medications on File Prior to Encounter   Medication Sig Dispense Refill    buprenorphine-naloxone 8-2 (SUBOXONE) 8-2 mg Subl Place 2.5 tablets under the tongue once daily.      nicotine (NICODERM CQ) 21 mg/24 hr Place 1 patch onto the skin once daily.         Review of patient's allergies indicates:   Allergen Reactions    Penicillins Shortness Of Breath and Swelling     Tolerated cefepime 3/2022         Objective:     Vitals:    04/05/22 1231   BP:    Pulse: 110   Resp: (!)  70   Temp:        Significant Labs:  Recent Labs   Lab 04/03/22  0302 04/04/22  0354 04/05/22  0247   HGB 8.7* 8.9* 8.1*       Lab Results   Component Value Date    WBC 13.87 (H) 04/05/2022    HGB 8.1 (L) 04/05/2022    HCT 26.1 (L) 04/05/2022     (H) 04/05/2022     04/05/2022       Lab Results   Component Value Date     04/05/2022    K 4.4 04/05/2022    CL 93 (L) 04/05/2022    CO2 37 (H) 04/05/2022    BUN 19 04/05/2022    CREATININE 0.4 (L) 04/05/2022    CALCIUM 10.1 04/05/2022    ANIONGAP 10 04/05/2022    ESTGFRAFRICA >60.0 04/05/2022    EGFRNONAA >60.0 04/05/2022       Lab Results   Component Value Date    ALT 58 (H) 04/05/2022    AST 38 04/05/2022     (H) 03/29/2022    ALKPHOS 1,159 (H) 04/05/2022    BILITOT 5.1 (H) 04/05/2022       Lab Results   Component Value Date    INR 1.1 04/05/2022    INR 1.1 04/04/2022    INR 1.1 04/03/2022       Significant Imaging:  Reviewed pertinent radiology findings.       Assessment/Plan:     37yo PMHx s/p cholecystectomy, polysubstance use d/o (meth, opiates, tobax) initially presents as transfer from OSH on 03/28 for further evaluation of acute liver injury.      W/u thus far notable for LFTs w/ AST/ / 79, , T bili 9.3. INR 1.4. BNP 1200. CTAP w/ contrast notable w/ SBO, large volume complex abdominopelvic fluid, b/l pleural effusions, hepatic steatosis. US liver w/ doppler w/ hepatic steatosis.     Hepatitis panel, ELIN, ASMA, ceruloplasmin pending.     Primary team is diuresing w/ lasix and surgery is following for SBO. Receiving lactulose and xifaxan as well.      Presentation concerning for cholestatic liver injury, however, no ksenia obstruction on imaging. W/u w/ serologies thus unrevealing except elevated ferritin 2000, . AMA, ELIN, ASMA, ceruloplasmin, A1AT negative.     Problem List:  1. Acute cholestatic liver injury     Recommendations:  2. Non-urgent MRCP when able  3. Possible liver biopsy when able      Thank you for  involving us in the care of Carolina Baldwin. Please call with any additional questions, concerns or changes in the patient's clinical status. We will continue to follow.     John Butler MD  Gastroenterology Fellow PGY IV   [facility]

## 2022-04-05 NOTE — ASSESSMENT & PLAN NOTE
Patient presenting with BNP 1200+, Repeat TTE 03/29, EF 35-40%, diastolic dysfunction. PAP 34 mmHg. No WMA per echo. Initial CXR demonstrated concerns for congestion vs effusion.    - Clinically euvolemic now  - Holding Lasix for now given euvolemic vs hypovolemic state  - Strict I/Os  - GDMT at future time as tolerated

## 2022-04-05 NOTE — PROGRESS NOTES
[facility]  Infectious Disease  Progress Note    Patient Name: Carolina Baldwin  MRN: [id number]  Admission Date: 3/28/2022  Length of Stay: 8 days  Attending Physician: Aguilar Ferreira MD  Primary Care Provider: Primary Doctor No    Isolation Status: No active isolations  Assessment/Plan:      Pneumonia of both lower lobes due to infectious organism  Afebrile and with mild leukocytosis of 13.8.  Expectorated sputum culture with a Gram-negative alec pending identification.  Unfortunately the sample was not collected during bronchoscopy procedure after mucus plugging.  Now off BiPAP.  · Continue meropenem for now.    · Will follow-up pending sputum culture results for further antibiotic optimization.  · We will plan for a 5 to 7 day course of therapy    Esophageal candidiasis  Symptom medically improving.  · Continue fluconazole.  · Treat for 14-21 days.      Anticipated Disposition: per primary    Thank you for your consult. I will follow-up with patient. Please contact us if you have any additional questions.    Marina Patel MD  Infectious Disease  [facility]    Subjective:     Principal Problem:Liver injury    HPI: This is a 37 yo female with history of IVDU on suboxone, use of meth, opioids, and tobacco, and gastric ulcers who presented to OSH due to jaundice, AMS, and evaluation for partial SBO. Became increasingly hypoxic and was started on broad spectrum antibiotics which were then discontinued. Found to have elevated ammonia levels and given lactulose and rifaximin. CT a/p with contrast revealed markedly dilated fluid/contrast and air filled loops of proximal small bowel concerning for SBO. Also reported small bilateral pleural effusions with consolidative changes at the lung bases. Diffuse GGO. Also findings suggestive of hepatic steatosis. [place] [facility] [place]. Has been tachycardic with normal BP and currently on BiPAP. Blood cultures  collected 3/29 with NGTD. Sputum culture growing GNR but no Staph aureus. Started on meropenem due to shortness of breath and swelling caused by penicillin, though she did tolerate cefepime 3/2022. Use of meropenem has prompted Infectious Diseases consult.     Interval History: ".  History possible noninvasive positive-pressure ventilation associated pneumonia to take his due to Gram-negative alec pending identification.  Up escalated to meropenem from cefepime by primary service.  Clinically improving and now off noninvasive ventilation.  Additionally with esophageal yeast infection diagnosed at transferring hospital.    Review of Systems   Constitutional:  Negative for chills, fatigue, fever and unexpected weight change.   HENT:  Negative for ear pain, facial swelling, hearing loss, mouth sores, nosebleeds, rhinorrhea, sinus pressure, sore throat, tinnitus, trouble swallowing and voice change.    Eyes:  Negative for photophobia, pain, redness and visual disturbance.   Respiratory:  Positive for shortness of breath. Negative for cough, chest tightness and wheezing.    Cardiovascular:  Negative for chest pain, palpitations and leg swelling.   Gastrointestinal:  Negative for abdominal pain, blood in stool, constipation, diarrhea, nausea and vomiting.   Endocrine: Negative for cold intolerance, heat intolerance, polydipsia, polyphagia and polyuria.   Genitourinary:  Negative for decreased urine volume, dysuria, flank pain, frequency, hematuria, menstrual problem, urgency, vaginal bleeding, vaginal discharge and vaginal pain.   Musculoskeletal:  Negative for arthralgias, back pain, joint swelling, myalgias and neck pain.   Skin:  Negative for rash.   Allergic/Immunologic: Negative for environmental allergies, food allergies and immunocompromised state.   Neurological:  Negative for dizziness, seizures, syncope, weakness, light-headedness, numbness and headaches.   Hematological:  Negative for adenopathy. Does not  bruise/bleed easily.   Psychiatric/Behavioral:  Negative for confusion, hallucinations, self-injury, sleep disturbance and suicidal ideas. The patient is not nervous/anxious.    Objective:     Vital Signs (Most Recent):  Temp: 98 °F (36.7 °C) (04/05/22 1100)  Pulse: 110 (04/05/22 1231)  Resp: (!) 22 (04/05/22 1407)  BP: 120/83 (04/05/22 1100)  SpO2: 98 % (04/05/22 1231)   Vital Signs (24h Range):  Temp:  [97.8 °F (36.6 °C)-98.1 °F (36.7 °C)] 98 °F (36.7 °C)  Pulse:  [100-141] 110  Resp:  [14-73] 22  SpO2:  [64 %-100 %] 98 %  BP: ()/(62-98) 120/83     Weight: 21.7 kg (47 lb 13.4 oz)  Body mass index is 9.34 kg/m².    Estimated Creatinine Clearance: 66.6 mL/min (A) (based on SCr of 0.4 mg/dL (L)).    Physical Exam  Vitals and nursing note reviewed.   Constitutional:       Appearance: She is well-developed. She is cachectic. She is ill-appearing.   HENT:      Head: Normocephalic and atraumatic.      Right Ear: External ear normal.      Left Ear: External ear normal.   Eyes:      General: Scleral icterus present.         Right eye: No discharge.         Left eye: No discharge.      Conjunctiva/sclera: Conjunctivae normal.   Cardiovascular:      Rate and Rhythm: Regular rhythm. Tachycardia present.      Heart sounds: Normal heart sounds. No murmur heard.    No friction rub. No gallop.   Pulmonary:      Effort: Pulmonary effort is normal. No respiratory distress.      Breath sounds: Normal breath sounds. No stridor. No wheezing or rales.   Abdominal:      General: Bowel sounds are normal.      Tenderness: There is no abdominal tenderness.   Musculoskeletal:         General: No tenderness. Normal range of motion.   Skin:     General: Skin is warm and dry.      Coloration: Skin is jaundiced.   Neurological:      Mental Status: She is alert and oriented to person, place, and time.       Significant Labs: Blood Culture:   Recent Labs   Lab 03/29/22  1117 03/29/22  1137   LABBLOO No growth after 5 days. No growth after 5  days.     BMP:   Recent Labs   Lab 04/05/22  0247   *      K 4.4   CL 93*   CO2 37*   BUN 19   CREATININE 0.4*   CALCIUM 10.1   MG 1.9     CBC:   Recent Labs   Lab 04/04/22  0354 04/05/22  0247   WBC 13.21* 13.87*   HGB 8.9* 8.1*   HCT 27.9* 26.1*    424     Respiratory Culture:   Recent Labs   Lab 03/31/22  1333   GSRESP <10 epithelial cells per low power field.  Moderate WBC's   Few Gram negative rods   RESPIRATORYC No S aureus isolated.  GRAM NEGATIVE PIERRE  Many  Identification and susceptibility pending  *       Significant Imaging: I have reviewed all pertinent imaging results/findings within the past 24 hours.

## 2022-04-05 NOTE — PHYSICIAN QUERY
PT Name: Carolina Baldwin  MR #: [id number]     DOCUMENTATION CLARIFICATION     CDS: Brandy Capley, RN  Email: [email or web address]    This form is a permanent document in the medical record.     Query Date: April 5, 2022    By submitting this query, we are merely seeking further clarification of documentation.  Please utilize your independent clinical judgment when addressing the question(s) below.    The Medical Record contains the following   Indicators Supporting Clinical Findings Location in Medical Record   X Heart Failure documented    Acute hypoxemic respiratory failure  No baseline home O2 per patient. Presenting with 5 L NC O2. Unclear etiology, though previous documentation suggestive of pleural effusion vs component of PNA. Unclear etiology as patient seems to be volume down on exam. No reported history of heart failure or COPD (though extensive tobacco use history). Etiology may be 2/2 to abdominal distention causing diaphragmatic pressure vs infectious etiology vs pleural effusion d/t third spacing?    Respiratory:  Positive for shortness of breath. Negative for cough, chest tightness and wheezing.    Cardiovascular:  Negative for chest pain, palpitations and leg swelling.     Combined systolic and diastolic heart failure   - no previous echo.  BNP elevated with bilateral effusions.  Diuresing with strict I/O.  GDMT when appropriate     Acute hypoxic respiratory failure   - initially 2/2 combination diastolic heart failure and lingular PNA.  Extubated 4/1.  Aggressive pulmonary hygiene with CPT, saline nebs and bronchodilators for thick secretions. POCUS with trace  bilateral effusions      H&P 3/28, filed 3/29                  Critical care progress notes 4/1   X BNP  03/28/22 21:39   BNP 1,251 (H)       Labs 3/28   X EF/Echo The left ventricle is normal in size with concentric remodeling and moderately decreased systolic function.  The estimated ejection fraction is 35-40%.  Left ventricular diastolic  dysfunction.  Mild mitral regurgitation.  Normal right ventricular size with normal right ventricular systolic function.  Mild tricuspid regurgitation.  The estimated PA systolic pressure is 34 mmHg.  Normal central venous pressure (3 mmHg).  Trivial posterolateral pericardial effusion.   Echo 3/29   X Radiology findings Bilateral airspace infiltrates/edema in a primarily perihilar distribution.  Retrocardiac airspace consolidation, left more than right.  Partial silhouette of the hemidiaphragm on the left.    Abnormal examination findings of complete atelectasis of the left hemithorax with shift of the cardiomediastinal silhouette to the left.  The findings are concerning for airway obstruction given the rapid development.    Essentially complete opacification of the left hemithorax, similar but mildly worse when compared with the prior exam.  Persistent interstitial/ground-glass opacities in the right lung.   CXR 3/28      CXR 3/29      CXR 3/30 0151    Subjective/Objective Respiratory Conditions      Recent/Current MI      Heart Transplant, LVAD     X Edema, JVD Right lower leg: No edema.      Left lower leg: No edema. H&P 3/28, filed 3/29    Ascites     X Diuretics/Meds furosemide injection 40 mg  Dose: 40 mg  Freq: Daily Route: IV  Start: 04/04/22 0900 End: 04/04/22 1826    furosemide injection 40 mg  Dose: 40 mg  Freq: Every 12 hours (non-standard times) Route: IV  Start: 03/29/22 2024 End: 04/03/22 1440    furosemide injection 40 mg  Dose: 40 mg  Freq: Daily Route: IV  Start: 03/29/22 0000 End: 03/29/22 0956   MAR 3/29-4/4    Other Treatment     X Other 37 y/o F with PMHx IVDU on suboxone, polysubstance use (meth, opioids, tobacco), gastric ulcers initially presented to OSH for increasing jaundice, lethargy, AMS, evaluation for pSBO, increased O2 requirement (3-4 L, no baseline home O2). Was started on broad spectrum Abx at OSH and eventually discontinued. Initially presented oriented x 2 and severe jaundice,  elevated ammonia, but normal LFTs? Started on lactulose and rifaximin. NGT decompression at OSH for SBO with good response, advanced to [facility]. Transfer[place] to [facility] [place].  H&P 3/28, filed 3/29     Heart failure is a clinical diagnosis which includes symptomatic fluid retention, elevated intracardiac pressures, and/or the inability of the heart to deliver adequate blood flow.    Heart Failure with reduced Ejection Fraction (HFrEF) or Systolic Heart Failure (loses ability to contract normally, EF is <40%)    Heart Failure with preserved Ejection Fraction (HFpEF) or Diastolic Heart Failure (stiff ventricles, does not relax properly, EF is >50%)     Heart Failure with Combined Systolic and Diastolic Failure (stiff ventricles, does not relax properly and EF is <50%)    Mid-range or mildly reduced ejection fraction (HFmrEF) is classified as systolic heart failure.   Common clues to acute exacerbation:  Rapidly progressive symptoms (w/in 2 weeks of presentation), using IV diuretics, using supplemental O2, pulmonary edema on Xray, new or worsening pleural effusion, +JVD or other signs of volume overload, MI w/in 4 weeks, and/or BNP >500  The clinical guidelines noted are only system guidelines, and do not replace the providers clinical judgment.    Provider, please clarify the diagnosis associated with the above clinical findings.    [   ]  Acute Systolic Heart Failure (HFrEF or HFmrEF) - new diagnosis     [  xx ]  Acute Combined Systolic and Diastolic Heart Failure - new diagnosis     [   ]  Other (please specify): ___________________________________     [  ]  Clinically Undetermined         Please document in your progress notes daily for the duration of treatment until resolved and include in your discharge summary.    References:  American Heart Association editorial staff. (2017, May). Ejection Fraction Heart Failure Measurement. American Heart Association.  https://www.heart.org/en/health-topics/heart-failure/diagnosing-heart-failure/ejection-fraction-heart-failure-measurement#:~:text=Ejection%20fraction%20(EF)%20is%20a,pushed%20out%20with%20each%20heartbeat  CAMILLE Boone (2020, December 15). Heart failure with preserved ejection fraction: Clinical manifestations and diagnosis. [employer]. https://www.[employer]/contents/heart-failure-with-preserved-ejection-fraction-clinical-manifestations-and-diagnosis.  ICD-10-CM/PCS Coding Clinic Third Quarter ICD-10, Effective with discharges: September 8, 2020 [facility] § Heart failure with mid-range or mildly reduced ejection fraction (2020).  Form No. [id number]

## 2022-04-05 NOTE — PROGRESS NOTES
[facility] - Cardiac Medical ICU  Critical Care Medicine  Progress Note    Patient Name: Carolina Baldwin  MRN: [id number]  Admission Date: 3/28/2022  Hospital Length of Stay: 8 days  Code Status: Full Code  Attending Provider: Aguilar Ferreira MD  Primary Care Provider: Primary Doctor No   Principal Problem: Liver injury    Subjective:     HPI:  35 y/o F with PMHx IVDU on suboxone, polysubstance use (meth, opioids, tobacco), gastric ulcers initially presented to OSH for increasing jaundice, lethargy, AMS, evaluation for pSBO, increased O2 requirement (3-4 L, no baseline home O2). Was started on broad spectrum Abx at OSH and eventually discontinued. Initially presented oriented x 2 and severe jaundice, elevated ammonia, but normal LFTs? Started on lactulose and rifaximin. NGT decompression at OSH for SBO with good response, advanced to [facility]D. [place] [facility] [place].     [place] [facility] [place], patient is overtly jaundiced but with intact mentation (a&o x4). Complains of lower back pain that she experienced from the long transport [place] [facility]. Also complains of increasing hunger and thirst (good appetite). Questionable historian, but understands the circumstance, stating she knows she is having new liver problems and is here for the hepatology team to evaluate her. Patient reports having good bowel movements and appropriate urinary voids. Specifically denies chest pain, increased work of breathing, increasing/worsening abdominal pain, fever/chills, nausea, vomiting, constipation, diarrhea, dysuria, suprapubic pain, recent IVDU (currently on suboxone), hallucinations (auditory and visual), tremors, seizures, loss of consciousness, headache, sensory/strength changes, abnormal bleeding.    Patient is from [place], [place]. Lives with boyfriend and 5 children. Of note, she calls her boyfriend her  but is not legally ; her closest relative is her sister. Substantial IVDU history (meth),  non-injectable opioid abuse. Recently stopped smoking history (smoked since 10 y/o). On suboxone per OSH report and per patient. Does not know if any active HIV, hepatitis infection. Family history of CAD, ACS, and cancer (unspecified).       Hospital/ICU Course:  Transferred from OSH to [facility] MICU for HLOC, hepatology evaluation, and SBO. On arrival, patient A&O x 4 with diffuse abdominal pain w/o rebound, on 5 L NC. CTAP demonstrated diffuse bowel air levels and dilatation c/w SBO. Bilateral lung effusions present with concerns for L lingula consolidation. AF VS HDS on presentation to [facility]. Bedside US without amenable ascites pocket for paracentesis. Gen Surg evaluated w/o need for surgical intervention. Started on CAP coverage for pulmonary findings. Lactulose titrated to 4-5 BM/day, rifaximin. Hepatology consulted and work up initiated. Patient desaturated on 5 L NC and subsequently stepped up to 10 L HFNC with eventual intubation. Found to be mucus plugging per CXR (L lung atelectasis with L midline shift). Levophed started during intubation, weaned off. Patient respiratory status improving. Bronch completed for mucus evacuation on 03/31. Extubated to NC with BiPAP on 04/01. Suction turned off to NGT per gen surg recs. Upgraded Cefepime coverage to Meropenem due to GNR+ Rcx while patient was intubated. Per OSH reports, there were previously concerns for esophageal candidiasis, as such, added Fluconazole. Increased airway mucus production, poor cough reflex. SLP swallow eval; NPO, TF per NGT in interim. Difficulty tolerating comfort flow trials due to poor cough reflex and inspiratory effort 2/2 deconditioning, however intermittent improvement through course.       Interval History/Significant Events: AF HDS NAEON. Patient weaned from BiPAP to NC this AM. Speaking more; appears to have more energy. No abdominal pain, chest pain. Tolerating TF well.    Review of Systems   Constitutional:  Positive for activity  change and fatigue. Negative for chills, diaphoresis and fever.   HENT:  Positive for dental problem (all teeth removed previously d/t poor dentition). Negative for sneezing and sore throat.    Eyes:  Negative for pain and visual disturbance.   Respiratory:  Negative for cough, chest tightness, shortness of breath and wheezing.    Cardiovascular:  Negative for chest pain, palpitations and leg swelling.   Gastrointestinal:  Positive for abdominal distention. Negative for abdominal pain, blood in stool, constipation, diarrhea, nausea and vomiting.   Genitourinary:  Negative for dysuria, hematuria and urgency.   Musculoskeletal:  Negative for arthralgias and myalgias.   Skin:  Positive for color change (jaundice). Negative for rash.   Neurological:  Positive for weakness. Negative for tremors, syncope, light-headedness and numbness.   Psychiatric/Behavioral:  Negative for agitation and confusion.    Objective:     Vital Signs (Most Recent):  Temp: 98.1 °F (36.7 °C) (04/05/22 0700)  Pulse: 105 (04/05/22 0800)  Resp: (!) 26 (04/05/22 0800)  BP: 114/62 (04/05/22 0800)  SpO2: 96 % (04/05/22 0800)   Vital Signs (24h Range):  Temp:  [97.8 °F (36.6 °C)-98.2 °F (36.8 °C)] 98.1 °F (36.7 °C)  Pulse:  [100-141] 105  Resp:  [16-51] 26  SpO2:  [64 %-100 %] 96 %  BP: ()/(62-98) 114/62   Weight: 21.7 kg (47 lb 13.4 oz)  Body mass index is 9.34 kg/m².      Intake/Output Summary (Last 24 hours) at 4/5/2022 0825  Last data filed at 4/5/2022 0800  Gross per 24 hour   Intake 733.36 ml   Output --   Net 733.36 ml       Physical Exam  Constitutional:       General: She is not in acute distress.     Appearance: She is ill-appearing. She is not toxic-appearing or diaphoretic.      Comments: Cachetic, temporal wasting, jaundiced, weak, but in no apparent distress   HENT:      Head: Normocephalic.      Comments: BiPAP, NGT     Nose: Nose normal.   Eyes:      General: Scleral icterus present.      Extraocular Movements: Extraocular  movements intact.      Pupils: Pupils are equal, round, and reactive to light.   Cardiovascular:      Rate and Rhythm: Regular rhythm. Tachycardia present.      Pulses: Normal pulses.      Heart sounds: Normal heart sounds. No murmur heard.  Pulmonary:      Effort: Pulmonary effort is normal. No respiratory distress.      Breath sounds: Normal breath sounds. No stridor. No wheezing or rales.      Comments: Poor cough reflex, transmitted upper airway noise  Abdominal:      General: Abdomen is flat. There is distension.      Palpations: Abdomen is soft.      Tenderness: There is no abdominal tenderness (improved). There is no guarding or rebound.   Musculoskeletal:         General: No swelling or tenderness. Normal range of motion.      Cervical back: Normal range of motion. No rigidity or tenderness.      Right lower leg: No edema.      Left lower leg: No edema.   Skin:     General: Skin is warm and dry.      Coloration: Skin is jaundiced.      Findings: Bruising and rash (petecchial rash on back) present.   Neurological:      General: No focal deficit present.      Mental Status: She is alert and oriented to person, place, and time. Mental status is at baseline.      Cranial Nerves: No cranial nerve deficit.      Sensory: No sensory deficit.      Motor: No weakness.   Psychiatric:         Mood and Affect: Mood normal.         Behavior: Behavior normal.         Thought Content: Thought content normal.       Vents:  Vent Mode: A/C (04/01/22 0705)  Ventilator Initiated: Yes (03/30/22 0141)  Set Rate: 16 BPM (04/01/22 0705)  Vt Set: 350 mL (04/01/22 0705)  Pressure Support: 0 cmH20 (04/01/22 0705)  PEEP/CPAP: 5 cmH20 (04/01/22 0705)  Oxygen Concentration (%): 45 (04/05/22 0500)  Peak Airway Pressure: 20 cmH2O (04/01/22 0705)  Plateau Pressure: 0 cmH20 (04/01/22 0705)  Total Ve: 9.02 mL (04/01/22 0705)  Negative Inspiratory Force (cm H2O): 0 (03/30/22 1552)  F/VT Ratio<105 (RSBI): (!) 65.45 (04/01/22  0705)  Lines/Drains/Airways       Peripherally Inserted Central Catheter Line  Duration             PICC Triple Lumen left basilic -- days              Drain  Duration                  NG/OG Tube 04/03/22 Left nostril 2 days         Rectal Tube 04/04/22 1430 fecal management system <1 day                  Significant Labs:    CBC/Anemia Profile:  Recent Labs   Lab 04/04/22  0354 04/05/22  0247   WBC 13.21* 13.87*   HGB 8.9* 8.1*   HCT 27.9* 26.1*    424   * 112*   RDW 21.6* 21.2*        Chemistries:  Recent Labs   Lab 04/04/22  0354 04/04/22  1551 04/05/22  0247    144 140   K 4.4 3.5 4.4   CL 92* 95 93*   CO2 34* 37* 37*   BUN 16 17 19   CREATININE 0.4* 0.5 0.4*   CALCIUM 10.1 9.9 10.1   ALBUMIN 3.5 3.4* 3.1*   PROT 7.4 7.5 7.0   BILITOT 6.0* 5.7* 5.1*   ALKPHOS 1,366* 1,289* 1,159*   ALT 76* 71* 58*   AST 63* 51* 38   MG 2.1 1.8 1.9   PHOS 3.9 4.3 3.5       All pertinent labs within the past 24 hours have been reviewed.    Significant Imaging:  I have reviewed all pertinent imaging results/findings within the past 24 hours.      ABG  Recent Labs   Lab 04/04/22  1841   PH 7.412   PO2 29*   PCO2 61.5*   HCO3 39.2*   BE 15     Assessment/Plan:     Psychiatric  Polysubstance abuse  Opioid abuse  Tobacco abuse  Methamphetamine abuse    Noted to be on suboxone through suboxone clinic. Unclear historical path.    - Addiction Psych consult for suboxone; per their report, patient is not on suboxone regularly and not interested at this time  - Urine toxicology negative at this time  - HIV negative  - Hep panel negative    Pulmonary  Acute hypoxemic respiratory failure  Bilateral pleural effusions  Pleural consolidation, L  Atelectasis  Mucus Plugging    No baseline home O2 per patient. Presenting with 5 L NC O2. Unclear etiology, though previous documentation suggestive of pleural effusion vs component of PNA. Unclear etiology as patient seems to be volume down on exam. No reported history of heart  failure or COPD (though extensive tobacco use history). Etiology may be 2/2 to abdominal distention causing diaphragmatic pressure vs infectious etiology vs pleural effusion d/t third spacing vs infection? CT demonstrating bilateral pleural effusions and lingula consolidation; concerns for possible PNA. 03/29 patient decompensated while on 5L > 10 HFNC > intubation; CXR demonstrating L lung atelectasis / mucus plugging. 03/31 bronchoscopy for mucus evacuation; RCx sent, mild improvement on CXR following. Extubated to NC on 04/01 to BiPAP. Continue weaning supplemental O2 down; challenging given poor cough reflex and copious mucus production.    - Oxygen requirement appears largely 2/2 poor mechanical ventilation d/t deconditioning with component of mucus plugging d/t poor cough reflex  - Trial alternating BiPAP and NC/HF, BiPAP qhs  - Encourage PT/OT for further strengthening  - RCx few GNR on gram stain, pending speciation  - Meropenem for VAP coverage / Fluconazole for OSH report of esophageal candidiasis   - Aggressive pulmonary hygiene, CPT, albuterol inhaler, nebulized saline, cough assist  - Repeat CXR demonstrates relative increased lung volumes, resolving atelectasis  - Continuous pulse ox  - Monitor fever curve; given lack of infectious presentation, will defer abx course now  - Lactate wnl  - Monitor BCx, RCx  - Aspiration precaution    Cardiac/Vascular  CHF (congestive heart failure)  Patient presenting with BNP 1200+, Repeat TTE 03/29, EF 35-40%, diastolic dysfunction. PAP 34 mmHg. No WMA per echo. Initial CXR demonstrated concerns for congestion vs effusion.    - Clinically euvolemic now  - Holding Lasix for now given euvolemic vs hypovolemic state  - Strict I/Os  - GDMT at future time as tolerated     Paroxysmal tachycardia  Presenting with tachycardia to 120s. Noted in OSH previous progress note and was tempered with BB. Unclear etiology, potentially volume down driving sinus tachycardia as patient  has been on NGT suction d/t pSBO with limited PO intake. Also appears to be extremely malnourished. In setting of potential acute pulmonary process, may be related to acute infection vs mucus plugging / atelectasis    - Repeat EKG demonstrating sinus tachycardia and abnormal R wave progression; no previous EKG for comparison  - Cardiac telemetry  - Electrolyte trend, K > 4, Mg > 2  - Will hold trial of IVF bolus until further evaluation regarding volume status (BNP elevated 1000+ on admission)  - TTE revealing EF 35-40%, PAP 34,       GI  Chronic biliary pancreatitis  Cholelithiasis    Patient family reports that she has had 3+ year history of abdominal pain secondary that they believe is due to her pancreas. Since then, she has had decreasing PO intake and loss of weight due to abdominal discomfort. Family states that patient did not regularly seek treatment for this issue, but used Tylenol for pain. Recently admitted in 03/2022 for gallstone pancreatitis at [facility], s/p lap cholecystectomy, c/b pSBO.    - See Failure to Thrive A/P    Generalized abdominal pain  H/o pSBO  H/o laparoscopic cholecystectomy  H/o chronic biliary pancreatitis   Dysphagia    Patient with recent h/o pSBO at OSH,with good response to NGT decompression. Advanced to CLD. Initially complaining of RUQ abdominal pain at OSH on initial presentation. Now with mild, diffuse abdominal pain on palpation, but none at rest. Abdomen remains distended. Hypoactive bowel sounds. Repeat KUB / CTAP demonstrating significant bowel dilatation aw/ air-fluid levels c/w SBO. NGT suction d/c on 04/01 after extubation    - Monitor BM, diet tolerance (currently passing BM and tolerating TF)  - NPO pending SLP (poor cough), ADAT following; TF per NGT in interim  - General surgery evaluated, no role for surgical intervention  - Bedside US abdomen without note for ascites or other process  - Serial abdominal exams    Orthopedic  * Liver injury  Acute hepatic  metabolic encephalopathy  Elevated transaminases  Elevated total bilirubin    35 y/o F with PMHx IVDU, polysubstance abuse presenting to [facility] as transfer from [facility] for hepatology evaluation. Initially presented with concerns for acute liver failure (encephalopathy, elevated elevated elevation of LFTs, jaundice) vs pSBO vs sepsis 2/2 abdominal infection. NGT decompression of pSBO with good response, advanced to CLD. Started on broad spectrum abx and tapered. Started on lactulose and rifaximin with good response of encephalopathy, now A&O x 4 without signs of asterixis, tongue fasciculations, hallucinations. Transferred to [facility] for further hepatological evaluation.    - LFTs remain elevated, but interval improvement; synthetic function intact   - Overall etiology may be 2/2 to extensive outpatient Tylenol use (states she used to take handfuls of Tylenol for her initial abdominal pain) vs other drug interactions?  - Per hepatology recommendations, future MRCP and possible liver biopsy pending  - Hepatology consulted and following; appreciate assistance  - Acute hepatitis panel negative  - HIV negative  - US liver doppler / abdomen did not demonstrate appreciable biliary duct dilatation  - Hold rifaximin, hold lactulose   - Ceruloplasmin wnl, Copper low  - ELIN, Anti-Sm negative  - PT/INR daily  - Trend CMP daily  - Trend CBC daily  - Monitor neurological status  - Ferritin, TIBC, Fe pending  - Anti alpha 1 trypsin now low  - Ascites studies unremarkable for SBP    Other  Failure to thrive in adult  Temporal wasting  Malnutrition    Setting of 3 year history of chronic pancreatitis without treatment and recent admission for gallstone pancreatitis s/p lap jim c/b pSBO, patient has had significantly decreased PO intake in conjunction with regular meth use.    - Nutrition consult; TFs recs in documentation; will titrate up as tolerated       Critical Care Daily Checklist:    A: Awake: RASS Goal/Actual  Goal: RASS Goal: 0-->alert and calm  Actual: Wan Agitation Sedation Scale (RASS): Drowsy   B: Spontaneous Breathing Trial Performed? Spon. Breathing Trial Initiated?: Initiated (04/01/22 1018)   C: SAT & SBT Coordinated?  N/A                      D: Delirium: CAM-ICU Overall CAM-ICU: Negative   E: Early Mobility Performed? Yes   F: Feeding Goal: Goals: Meet % EEN, EPN by RD f/u date  Status: Nutrition Goal Status: new   Current Diet Order   Procedures    Diet NPO      AS: Analgesia/Sedation N/A   T: Thromboembolic Prophylaxis Lovenox   H: HOB > 300 Yes   U: Stress Ulcer Prophylaxis (if needed) Pepcid   G: Glucose Control SSI   B: Bowel Function Stool Occurrence: 1   I: Indwelling Catheter (Lines & Perez) Necessity PICC   D: De-escalation of Antimicrobials/Pharmacotherapies Meropenem, Fluconazole    Plan for the day/ETD Continue pulm support, wean BiPAP    Code Status:  Family/Goals of Care: Full Code         Critical secondary to Patient has a condition that poses threat to life and bodily function: Severe Respiratory Distress      Critical care was time spent personally by me on the following activities: development of treatment plan with patient or surrogate and bedside caregivers, discussions with consultants, evaluation of patient's response to treatment, examination of patient, ordering and performing treatments and interventions, ordering and review of laboratory studies, ordering and review of radiographic studies, pulse oximetry, re-evaluation of patient's condition. This critical care time did not overlap with that of any other provider or involve time for any procedures.     Geoffrey Gloria MD  Critical Care Medicine  [facility] - Cardiac Medical ICU

## 2022-04-05 NOTE — SUBJECTIVE & OBJECTIVE
Interval History: ".  History possible noninvasive positive-pressure ventilation associated pneumonia to take his due to Gram-negative alec pending identification.  Up escalated to meropenem from cefepime by primary service.  Clinically improving and now off noninvasive ventilation.  Additionally with esophageal yeast infection diagnosed at transferring hospital.    Review of Systems   Constitutional:  Negative for chills, fatigue, fever and unexpected weight change.   HENT:  Negative for ear pain, facial swelling, hearing loss, mouth sores, nosebleeds, rhinorrhea, sinus pressure, sore throat, tinnitus, trouble swallowing and voice change.    Eyes:  Negative for photophobia, pain, redness and visual disturbance.   Respiratory:  Positive for shortness of breath. Negative for cough, chest tightness and wheezing.    Cardiovascular:  Negative for chest pain, palpitations and leg swelling.   Gastrointestinal:  Negative for abdominal pain, blood in stool, constipation, diarrhea, nausea and vomiting.   Endocrine: Negative for cold intolerance, heat intolerance, polydipsia, polyphagia and polyuria.   Genitourinary:  Negative for decreased urine volume, dysuria, flank pain, frequency, hematuria, menstrual problem, urgency, vaginal bleeding, vaginal discharge and vaginal pain.   Musculoskeletal:  Negative for arthralgias, back pain, joint swelling, myalgias and neck pain.   Skin:  Negative for rash.   Allergic/Immunologic: Negative for environmental allergies, food allergies and immunocompromised state.   Neurological:  Negative for dizziness, seizures, syncope, weakness, light-headedness, numbness and headaches.   Hematological:  Negative for adenopathy. Does not bruise/bleed easily.   Psychiatric/Behavioral:  Negative for confusion, hallucinations, self-injury, sleep disturbance and suicidal ideas. The patient is not nervous/anxious.    Objective:     Vital Signs (Most Recent):  Temp: 98 °F (36.7 °C) (04/05/22 1100)  Pulse:  110 (04/05/22 1231)  Resp: (!) 22 (04/05/22 1407)  BP: 120/83 (04/05/22 1100)  SpO2: 98 % (04/05/22 1231)   Vital Signs (24h Range):  Temp:  [97.8 °F (36.6 °C)-98.1 °F (36.7 °C)] 98 °F (36.7 °C)  Pulse:  [100-141] 110  Resp:  [14-73] 22  SpO2:  [64 %-100 %] 98 %  BP: ()/(62-98) 120/83     Weight: 21.7 kg (47 lb 13.4 oz)  Body mass index is 9.34 kg/m².    Estimated Creatinine Clearance: 66.6 mL/min (A) (based on SCr of 0.4 mg/dL (L)).    Physical Exam  Vitals and nursing note reviewed.   Constitutional:       Appearance: She is well-developed. She is cachectic. She is ill-appearing.   HENT:      Head: Normocephalic and atraumatic.      Right Ear: External ear normal.      Left Ear: External ear normal.   Eyes:      General: Scleral icterus present.         Right eye: No discharge.         Left eye: No discharge.      Conjunctiva/sclera: Conjunctivae normal.   Cardiovascular:      Rate and Rhythm: Regular rhythm. Tachycardia present.      Heart sounds: Normal heart sounds. No murmur heard.    No friction rub. No gallop.   Pulmonary:      Effort: Pulmonary effort is normal. No respiratory distress.      Breath sounds: Normal breath sounds. No stridor. No wheezing or rales.   Abdominal:      General: Bowel sounds are normal.      Tenderness: There is no abdominal tenderness.   Musculoskeletal:         General: No tenderness. Normal range of motion.   Skin:     General: Skin is warm and dry.      Coloration: Skin is jaundiced.   Neurological:      Mental Status: She is alert and oriented to person, place, and time.       Significant Labs: Blood Culture:   Recent Labs   Lab 03/29/22  1117 03/29/22  1137   LABBLOO No growth after 5 days. No growth after 5 days.     BMP:   Recent Labs   Lab 04/05/22  0247   *      K 4.4   CL 93*   CO2 37*   BUN 19   CREATININE 0.4*   CALCIUM 10.1   MG 1.9     CBC:   Recent Labs   Lab 04/04/22  0354 04/05/22  0247   WBC 13.21* 13.87*   HGB 8.9* 8.1*   HCT 27.9* 26.1*   PLT  448 424     Respiratory Culture:   Recent Labs   Lab 03/31/22  1333   GSRESP <10 epithelial cells per low power field.  Moderate WBC's   Few Gram negative rods   RESPIRATORYC No S aureus isolated.  GRAM NEGATIVE PIERRE  Many  Identification and susceptibility pending  *       Significant Imaging: I have reviewed all pertinent imaging results/findings within the past 24 hours.

## 2022-04-05 NOTE — NURSING
"At 1600 Dr. Zi-on called to bedside No change in vitals but "feels bad". To monitor see vitals. 12 lead done per  1705. Pt. "do not want bipap on". Monitor continue  in and out of room monitoring.   "

## 2022-04-05 NOTE — ASSESSMENT & PLAN NOTE
Acute hepatic metabolic encephalopathy  Elevated transaminases  Elevated total bilirubin    35 y/o F with PMHx IVDU, polysubstance abuse presenting to [facility] [place] [facility] as transfer from [facility] for hepatology evaluation. Initially presented with concerns for acute liver failure (encephalopathy, elevated elevated elevation of LFTs, jaundice) vs pSBO vs sepsis 2/2 abdominal infection. NGT decompression of pSBO with good response, advanced to CLD. Started on broad spectrum abx and tapered. Started on lactulose and rifaximin with good response of encephalopathy, now A&O x 4 without signs of asterixis, tongue fasciculations, hallucinations. Transferred to [facility] [place] [facility] for further hepatological evaluation.    - LFTs remain elevated, but interval improvement; synthetic function intact  - Overall etiology may be 2/2 to extensive outpatient Tylenol use (states she used to take handfuls of Tylenol for her initial abdominal pain) vs other drug interactions?  - Per hepatology recommendations, MRCP and possible liver biopsy pending  - Hepatology consulted and following; appreciate assistance  - Acute hepatitis panel negative  - HIV negative  - US liver doppler / abdomen did not demonstrate appreciable biliary duct dilatation  - Lactulose 20 BID (titrate to 4-5 BMs per day)  - Rifaximin  - Ceruloplasmin wnl, Copper low  - ELIN, Anti-Sm negative  - PT/INR daily  - Trend CMP daily  - Trend CBC daily  - Monitor neurological status  - Ferritin, TIBC, Fe pending  - Anti alpha 1 trypsin pending  - Ascites studies unremarkable for SBP

## 2022-04-05 NOTE — PT/OT/SLP PROGRESS
Physical Therapy Treatment    Patient Name:  Carolina Baldwin   MRN:  [id number]  Admitting Diagnosis:  Liver injury   Recent Surgery: * No surgery found *    Admit Date: 3/28/2022  Length of Stay: 8 days    Recommendations:     Discharge Recommendations:  home health PT, home health OT   Discharge Equipment Recommendations: 3-in-1 commode, shower chair   Barriers to discharge: trending medical condition    Assessment:     Carolina Baldwin is a 36 y.o. female admitted with a medical diagnosis of Liver injury.  She presents with the following impairments/functional limitations:  weakness, impaired endurance, impaired self care skills, impaired functional mobilty, gait instability, decreased upper extremity function, decreased lower extremity function, pain, impaired cardiopulmonary response to activity, impaired skin. Pt's PT session focused on LE strengthening as well as increasing activity performed to improve pt's cardiopulmonary endurance. Pt tolerated treatment session well today. Pt able to meet x1 goal on this date demonstrating overall improvements in functional independence. Pt requiring decreased assist for transfers and is also able to tolerate increased time EOB, pointing towards improvements in endurance and strength. Pt will continue to benefit from skilled PT services during this hospital admit to continue to improve transfer ability and efficiency as well as continue to progress pt's ambulation distance and cardiopulmonary endurance to maximize pt's functional independence and return to PLOF. After discharge pt will benefit from HHPT to further progress functional mobility and cardiopulmonary endurance as well as for home safety and energy conservation techniques.      Rehab Prognosis: Good; patient would benefit from acute skilled PT services to address these deficits and reach maximum level of function.    Recent Surgery: * No surgery found *      Plan:     During this hospitalization, patient to be  seen 3 x/week to address the identified rehab impairments via gait training, therapeutic activities, therapeutic exercises, neuromuscular re-education and progress toward the following goals:    · Plan of Care Expires:  04/28/22    Subjective     RN notified prior to session. No family/friends present upon PT entrance into room.    Chief Complaint: Pt eager to get OOB  Patient/Family Comments/goals: pt wants to eat, get stronger  Pain/Comfort:  · Pain Rating 1: 0/10  · Pain Rating Post-Intervention 1: 0/10      Objective:     Additional staff present: RN present throughout to assist with vital sign monitoring    Patient found HOB elevated with: telemetry, blood pressure cuff, pulse ox (continuous), PICC line, NG tube, oxygen, bowel management system   Cognition:   · Alert and Cooperative  · AxOx4  · Command following: Follows multistep verbal commands  · Fluency: clear/fluent  General Precautions: Standard, Cardiac aspiration, fall, respiratory, NPO   Orthopedic Precautions:N/A   Braces: N/A   Body mass index is 9.34 kg/m².  Oxygen Device: Nasal Cannula 4L  Vitals: /83   Pulse 109   Temp 98 °F (36.7 °C) (Oral)   Resp 14   Ht 5' (1.524 m)   Wt 21.7 kg (47 lb 13.4 oz)   SpO2 96%   BMI 9.34 kg/m²     Outcome Measures:  AM-PAC 6 CLICK MOBILITY  Turning over in bed (including adjusting bedclothes, sheets and blankets)?: 3  Sitting down on and standing up from a chair with arms (e.g., wheelchair, bedside commode, etc.): 3  Moving from lying on back to sitting on the side of the bed?: 3  Moving to and from a bed to a chair (including a wheelchair)?: 3  Need to walk in hospital room?: 3  Climbing 3-5 steps with a railing?: 2  Basic Mobility Total Score: 17     Functional Mobility:    Bed Mobility:   · Supine to Sit: minimum assistance; from Rt side of bed  · Scooting anteriorly to EOB to have both feet planted on floor: stand by assistance    Sitting Balance at Edge of Bed:   Static Sitting Balance: Good :  able to maintain balance against moderate resistance   Dynamic Sitting Balance: Good : able to sit unsupported and weight shift across midline moderately   Assistance Level Required: Supervision   Time: 8 minutes   Postural deviations noted: slouched posture and rounded shoulders   Comments: Time EOB focused primarily on tolerance to upright positioning, cardiopulmonary response and endurance for activities, and strength of postural musculature to perform dynamic sitting to prepare for tasks in the home. Pt able to accept internal and external perturbations to balance while seated EOB with appropriate trunk response to remain upright with no LOB and no UE support. Able to perform reaches anterior reaches inside and outside WM with no LOB. Pt with c/o dizziness which resolved with increased time EOB and ankle pumps. Pt's VSS throughout.    Transfers:   · Sit <> Stand Transfer: contact guard assistance with no assistive device   · Stand <> Sit Transfer: contact guard assistance with no assistive device   · [id number] from EOB  · Bed <> Chair Transfer: Step Transfer technique with contact guard assistance with no assistive device  · Chair on patient's Rt    Standing Balance:   Static Standing Balance: Good- : able to maintain standing balance against minimal resistance   Dynamic Standing Balance: Good- : able to stand independently unsupported and weight shift across midline minimally   Assistance Level Required: Contact Guard Assistance   Patient used: no assistive device    Comments: no c/o dizziness once standing      Gait:  · Patient ambulated: ~4 ft to bedside chair   · Patient required: contact guard  · Patient used:  no assistive device   · Gait Pattern observed: reciprocal gait  · Gait Deviation(s): decreased step length, narrow base of support, decreased weight shift, shuffle gait and flexed posture  · Impairments due to: decreased strength and decreased endurance  · all lines remained intact  throughout ambulation trial  · Comments: Pt with narrow WM, decreased foot clearance, and decreased step length likely due to a combination of weakness and easy fatigue. Pt initially requiring HHA but able to transition to no UE support quickly with no LOB or increase in instability noted.    Education:   Time provided for education, counseling and discussion of health disposition in regards to patient's current status   All questions answered within PT scope of practice and to patient's satisfaction   PT role in POC to address current functional deficits   Pt educated on proper body mechanics, safety techniques, and energy conservation with PT facilitation and cueing throughout session   Call nursing/pct to transfer to chair/use bathroom. Pt stated understanding.   Whiteboard updated with therapist name and pt's current mobility status documented above   Safe to perform step transfer to/from chair/bedside commode CGA and no AD w/ nursing/PCT present   Importance of OOB tolerance prn hrs/day to improve lung ventilation and expansion as well as strengthen postural musculature    Patient left up in chair with all lines intact, call button in reach and RN present.    GOALS:   Multidisciplinary Problems     Physical Therapy Goals        Problem: Physical Therapy    Goal Priority Disciplines Outcome Goal Variances Interventions   Physical Therapy Goal     PT, PT/OT Ongoing, Progressing     Description: Goals to be met by: 22    Patient will increase functional independence with mobility by performin. Supine to sit with Stand-by Assistance - not met  2. Sit to stand transfer with Contact Guard Assistance with RW - met 4/5  2a. Sit to stand transfer with supervision - not met  3. Gait  x 100 feet with Contact Guard Assistance using RW. - not met  4. Ascend/descend 3 stair with bilateral Handrails Contact Guard Assistance - not met  5. Lower extremity exercise program x15 reps with assistance as needed  to increase tolerance to activities. - not met                 Time Tracking:     PT Received On: 04/05/22  PT Start Time: 1024     PT Stop Time: 1047  PT Total Time (min): 23 min     Billable Minutes:   · Therapeutic Activity 23 minutes    Treatment Type: Treatment  PT/PTA: PT       Meri Millard PT, DPT  4/5/2022  Pager: [phone]

## 2022-04-05 NOTE — PLAN OF CARE
CMICU DAILY GOALS       A: Awake    RASS: Goal - RASS Goal: 0-->alert and calm  Actual - RASS (Wan Agitation-Sedation Scale): -1-->drowsy   Restraint necessity: Clinical Justification: Treatment Interference  B: Breathe   SBT: Not intubated   C: Coordinate A & B, analgesics/sedatives   Pain: managed    SAT: Not intubated  D: Delirium   CAM-ICU: Overall CAM-ICU: Negative  E: Early(intubated/ Progressive (non-intubated) Mobility   MOVE Screen: Pass   Activity: Activity Management: Arm raise - L1  FAS: Feeding/Nutrition   Diet order: Diet/Nutrition Received: tube feeding, Specialty Diet/Nutrition Received: other (see comments) (Isosource 1.5 continuos via NGT)  T: Thrombus   DVT prophylaxis: VTE Required Core Measure: Pharmacological prophylaxis initiated/maintained  H: HOB Elevation   Head of Bed (HOB) Positioning: HOB at 30-45 degrees  U: Ulcer Prophylaxis   GI: yes  G: Glucose control   managed    S: Skin   Bathing/Skin Care: bath, complete, dressed/undressed, incontinence care, linen changed  Device Skin Pressure Protection: absorbent pad utilized/changed, adhesive use limited, positioning supports utilized, pressure points protected, skin-to-device areas padded, skin-to-skin areas padded  Pressure Reduction Devices: positioning supports utilized, heel offloading device utilized  Pressure Reduction Techniques: frequent weight shift encouraged  Skin Protection: adhesive use limited, incontinence pads utilized, skin-to-device areas padded, skin-to-skin areas padded, tubing/devices free from skin contact  B: Bowel Function   no issues   I: Indwelling Catheters   Perez necessity: [REMOVED]      Urethral Catheter 03/28/22 1848 Latex-Reason for Continuing Urinary Catheterization: Critically ill in ICU and requiring hourly monitoring of intake/output   CVC necessity: No  D: De-escalation Antibiotics   Yes    Family/Goals of care/Code Status   Code Status: Full Code    24H Vital Sign Range  Temp:  [97.8 °F (36.6  °C)-98.2 °F (36.8 °C)]   Pulse:  [100-141]   Resp:  [16-51]   BP: ()/(68-98)   SpO2:  [64 %-100 %]      Shift Events   Pt with no urine output during shift. Bladder scan with 0 mL present. CC2 notified, and water boluses ordered per MD for additional fluid intake.     VS and assessment per flow sheet, patient progressing towards goals as tolerated, plan of care reviewed with family, all concerns addressed, will continue to monitor.

## 2022-04-05 NOTE — ASSESSMENT & PLAN NOTE
Afebrile and with mild leukocytosis of 13.8.  Expectorated sputum culture with a Gram-negative alec pending identification.  Unfortunately the sample was not collected during bronchoscopy procedure after mucus plugging.  Now off BiPAP.  · Continue meropenem for now.    · Will follow-up pending sputum culture results for further antibiotic optimization.  · We will plan for a 5 to 7 day course of therapy

## 2022-04-05 NOTE — ASSESSMENT & PLAN NOTE
H/o pSBO  H/o laparoscopic cholecystectomy  H/o chronic biliary pancreatitis   Dysphagia    Patient with recent h/o pSBO at OSH,with good response to NGT decompression. Advanced to CLD. Initially complaining of RUQ abdominal pain at OSH on initial presentation. Now with mild, diffuse abdominal pain on palpation, but none at rest. Abdomen remains distended. Hypoactive bowel sounds. Repeat KUB / CTAP demonstrating significant bowel dilatation aw/ air-fluid levels c/w SBO. NGT suction d/c on 04/01 after extubation    - Monitor BM, diet tolerance (currently passing BM and tolerating TF)  - NPO pending SLP (poor cough), ADAT following; TF per NGT in interim  - General surgery evaluated, no role for surgical intervention  - Bedside US abdomen without note for ascites or other process  - Serial abdominal exams

## 2022-04-05 NOTE — ASSESSMENT & PLAN NOTE
Bilateral pleural effusions  Pleural consolidation, L  Atelectasis  Mucus Plugging    No baseline home O2 per patient. Presenting with 5 L NC O2. Unclear etiology, though previous documentation suggestive of pleural effusion vs component of PNA. Unclear etiology as patient seems to be volume down on exam. No reported history of heart failure or COPD (though extensive tobacco use history). Etiology may be 2/2 to abdominal distention causing diaphragmatic pressure vs infectious etiology vs pleural effusion d/t third spacing vs infection? CT demonstrating bilateral pleural effusions and lingula consolidation; concerns for possible PNA. 03/29 patient decompensated while on 5L > 10 HFNC > intubation; CXR demonstrating L lung atelectasis / mucus plugging. 03/31 bronchoscopy for mucus evacuation; RCx sent, mild improvement on CXR following. Extubated to NC on 04/01 to BiPAP. Continue weaning supplemental O2 down; challenging given poor cough reflex and copious mucus production.    - Oxygen requirement appears largely 2/2 poor mechanical ventilation d/t deconditioning with component of mucus plugging d/t poor cough reflex  - Trial alternating BiPAP and NC/HF, BiPAP qhs  - Encourage PT/OT for further strengthening  - RCx few GNR on gram stain, pending speciation  - Meropenem for VAP coverage / Fluconazole for OSH report of esophageal candidiasis   - Aggressive pulmonary hygiene, CPT, albuterol inhaler, nebulized saline, cough assist  - Repeat CXR demonstrates relative increased lung volumes, resolving atelectasis  - Continuous pulse ox  - Monitor fever curve; given lack of infectious presentation, will defer abx course now  - Lactate wnl  - Monitor BCx, RCx  - Aspiration precaution

## 2022-04-06 PROBLEM — E43 SEVERE MALNUTRITION: Status: ACTIVE | Noted: 2022-04-06

## 2022-04-06 LAB
ALBUMIN SERPL BCP-MCNC: 2.8 G/DL (ref 3.5–5.2)
ALBUMIN SERPL BCP-MCNC: 2.8 G/DL (ref 3.5–5.2)
ALP SERPL-CCNC: 1071 U/L (ref 55–135)
ALP SERPL-CCNC: 1117 U/L (ref 55–135)
ALT SERPL W/O P-5'-P-CCNC: 48 U/L (ref 10–44)
ALT SERPL W/O P-5'-P-CCNC: 57 U/L (ref 10–44)
ANION GAP SERPL CALC-SCNC: 10 MMOL/L (ref 8–16)
ANION GAP SERPL CALC-SCNC: 9 MMOL/L (ref 8–16)
AST SERPL-CCNC: 36 U/L (ref 10–40)
AST SERPL-CCNC: 55 U/L (ref 10–40)
BASOPHILS # BLD AUTO: 0.02 K/UL (ref 0–0.2)
BASOPHILS NFR BLD: 0.2 % (ref 0–1.9)
BILIRUB SERPL-MCNC: 4.3 MG/DL (ref 0.1–1)
BILIRUB SERPL-MCNC: 4.4 MG/DL (ref 0.1–1)
BUN SERPL-MCNC: 13 MG/DL (ref 6–20)
BUN SERPL-MCNC: 16 MG/DL (ref 6–20)
CALCIUM SERPL-MCNC: 9.2 MG/DL (ref 8.7–10.5)
CALCIUM SERPL-MCNC: 9.2 MG/DL (ref 8.7–10.5)
CHLORIDE SERPL-SCNC: 91 MMOL/L (ref 95–110)
CHLORIDE SERPL-SCNC: 96 MMOL/L (ref 95–110)
CO2 SERPL-SCNC: 34 MMOL/L (ref 23–29)
CO2 SERPL-SCNC: 34 MMOL/L (ref 23–29)
CREAT SERPL-MCNC: 0.4 MG/DL (ref 0.5–1.4)
CREAT SERPL-MCNC: 0.4 MG/DL (ref 0.5–1.4)
DIFFERENTIAL METHOD: ABNORMAL
EOSINOPHIL # BLD AUTO: 0 K/UL (ref 0–0.5)
EOSINOPHIL NFR BLD: 0.3 % (ref 0–8)
ERYTHROCYTE [DISTWIDTH] IN BLOOD BY AUTOMATED COUNT: 20 % (ref 11.5–14.5)
EST. GFR  (AFRICAN AMERICAN): >60 ML/MIN/1.73 M^2
EST. GFR  (AFRICAN AMERICAN): >60 ML/MIN/1.73 M^2
EST. GFR  (NON AFRICAN AMERICAN): >60 ML/MIN/1.73 M^2
EST. GFR  (NON AFRICAN AMERICAN): >60 ML/MIN/1.73 M^2
GLUCOSE SERPL-MCNC: 126 MG/DL (ref 70–110)
GLUCOSE SERPL-MCNC: 136 MG/DL (ref 70–110)
HCT VFR BLD AUTO: 25.3 % (ref 37–48.5)
HGB BLD-MCNC: 7.7 G/DL (ref 12–16)
IMM GRANULOCYTES # BLD AUTO: 0.13 K/UL (ref 0–0.04)
IMM GRANULOCYTES NFR BLD AUTO: 1.1 % (ref 0–0.5)
INR PPP: 1 (ref 0.8–1.2)
LYMPHOCYTES # BLD AUTO: 1.5 K/UL (ref 1–4.8)
LYMPHOCYTES NFR BLD: 12.5 % (ref 18–48)
MAGNESIUM SERPL-MCNC: 1.7 MG/DL (ref 1.6–2.6)
MAGNESIUM SERPL-MCNC: 1.8 MG/DL (ref 1.6–2.6)
MCH RBC QN AUTO: 33.9 PG (ref 27–31)
MCHC RBC AUTO-ENTMCNC: 30.4 G/DL (ref 32–36)
MCV RBC AUTO: 112 FL (ref 82–98)
MONOCYTES # BLD AUTO: 0.7 K/UL (ref 0.3–1)
MONOCYTES NFR BLD: 5.8 % (ref 4–15)
NEUTROPHILS # BLD AUTO: 9.3 K/UL (ref 1.8–7.7)
NEUTROPHILS NFR BLD: 80.1 % (ref 38–73)
NRBC BLD-RTO: 0 /100 WBC
PHOSPHATE SERPL-MCNC: 3.4 MG/DL (ref 2.7–4.5)
PHOSPHATE SERPL-MCNC: 4.1 MG/DL (ref 2.7–4.5)
PLATELET # BLD AUTO: 442 K/UL (ref 150–450)
PMV BLD AUTO: 10.9 FL (ref 9.2–12.9)
POTASSIUM SERPL-SCNC: 4.3 MMOL/L (ref 3.5–5.1)
POTASSIUM SERPL-SCNC: 4.7 MMOL/L (ref 3.5–5.1)
PROT SERPL-MCNC: 6.2 G/DL (ref 6–8.4)
PROT SERPL-MCNC: 6.2 G/DL (ref 6–8.4)
PROTHROMBIN TIME: 10.4 SEC (ref 9–12.5)
RBC # BLD AUTO: 2.27 M/UL (ref 4–5.4)
SODIUM SERPL-SCNC: 134 MMOL/L (ref 136–145)
SODIUM SERPL-SCNC: 140 MMOL/L (ref 136–145)
WBC # BLD AUTO: 11.65 K/UL (ref 3.9–12.7)

## 2022-04-06 PROCEDURE — 25000003 PHARM REV CODE 250: Performed by: STUDENT IN AN ORGANIZED HEALTH CARE EDUCATION/TRAINING PROGRAM

## 2022-04-06 PROCEDURE — 99233 SBSQ HOSP IP/OBS HIGH 50: CPT | Mod: ,,, | Performed by: INTERNAL MEDICINE

## 2022-04-06 PROCEDURE — 27000207 HC ISOLATION

## 2022-04-06 PROCEDURE — 94660 CPAP INITIATION&MGMT: CPT

## 2022-04-06 PROCEDURE — 85610 PROTHROMBIN TIME: CPT | Performed by: STUDENT IN AN ORGANIZED HEALTH CARE EDUCATION/TRAINING PROGRAM

## 2022-04-06 PROCEDURE — 27000221 HC OXYGEN, UP TO 24 HOURS

## 2022-04-06 PROCEDURE — 84100 ASSAY OF PHOSPHORUS: CPT

## 2022-04-06 PROCEDURE — 99291 CRITICAL CARE FIRST HOUR: CPT | Mod: ,,, | Performed by: EMERGENCY MEDICINE

## 2022-04-06 PROCEDURE — 94761 N-INVAS EAR/PLS OXIMETRY MLT: CPT

## 2022-04-06 PROCEDURE — 80053 COMPREHEN METABOLIC PANEL: CPT | Mod: 91

## 2022-04-06 PROCEDURE — 85025 COMPLETE CBC W/AUTO DIFF WBC: CPT

## 2022-04-06 PROCEDURE — 25000003 PHARM REV CODE 250

## 2022-04-06 PROCEDURE — 63600175 PHARM REV CODE 636 W HCPCS

## 2022-04-06 PROCEDURE — 20000000 HC ICU ROOM

## 2022-04-06 PROCEDURE — 25000242 PHARM REV CODE 250 ALT 637 W/ HCPCS

## 2022-04-06 PROCEDURE — 94640 AIRWAY INHALATION TREATMENT: CPT

## 2022-04-06 PROCEDURE — 99291 PR CRITICAL CARE, E/M 30-74 MINUTES: ICD-10-PCS | Mod: ,,, | Performed by: EMERGENCY MEDICINE

## 2022-04-06 PROCEDURE — 83735 ASSAY OF MAGNESIUM: CPT | Mod: 91

## 2022-04-06 PROCEDURE — 63600175 PHARM REV CODE 636 W HCPCS: Performed by: EMERGENCY MEDICINE

## 2022-04-06 PROCEDURE — 99900035 HC TECH TIME PER 15 MIN (STAT)

## 2022-04-06 PROCEDURE — 92526 ORAL FUNCTION THERAPY: CPT

## 2022-04-06 PROCEDURE — 99233 PR SUBSEQUENT HOSPITAL CARE,LEVL III: ICD-10-PCS | Mod: ,,, | Performed by: INTERNAL MEDICINE

## 2022-04-06 PROCEDURE — S4991 NICOTINE PATCH NONLEGEND: HCPCS

## 2022-04-06 RX ADMIN — MEROPENEM AND SODIUM CHLORIDE 500 MG: 500 INJECTION, SOLUTION INTRAVENOUS at 03:04

## 2022-04-06 RX ADMIN — SODIUM CHLORIDE SOLN NEBU 3% 4 ML: 3 NEBU SOLN at 07:04

## 2022-04-06 RX ADMIN — ALBUTEROL SULFATE 2.5 MG: 2.5 SOLUTION RESPIRATORY (INHALATION) at 12:04

## 2022-04-06 RX ADMIN — SODIUM CHLORIDE SOLN NEBU 3% 4 ML: 3 NEBU SOLN at 02:04

## 2022-04-06 RX ADMIN — ALBUTEROL SULFATE 2.5 MG: 2.5 SOLUTION RESPIRATORY (INHALATION) at 07:04

## 2022-04-06 RX ADMIN — FLUCONAZOLE 150 MG: 40 POWDER, FOR SUSPENSION ORAL at 09:04

## 2022-04-06 RX ADMIN — HEPARIN SODIUM 5000 UNITS: 5000 INJECTION INTRAVENOUS; SUBCUTANEOUS at 09:04

## 2022-04-06 RX ADMIN — NICOTINE 1 PATCH: 14 PATCH, EXTENDED RELEASE TRANSDERMAL at 08:04

## 2022-04-06 RX ADMIN — GUAIFENESIN 200 MG: 100 SOLUTION ORAL at 02:04

## 2022-04-06 RX ADMIN — HEPARIN SODIUM 5000 UNITS: 5000 INJECTION INTRAVENOUS; SUBCUTANEOUS at 08:04

## 2022-04-06 RX ADMIN — GUAIFENESIN 200 MG: 100 SOLUTION ORAL at 09:04

## 2022-04-06 RX ADMIN — HYDROXYZINE HYDROCHLORIDE 25 MG: 25 TABLET ORAL at 04:04

## 2022-04-06 RX ADMIN — Medication 6 MG: at 08:04

## 2022-04-06 RX ADMIN — ALBUTEROL SULFATE 2.5 MG: 2.5 SOLUTION RESPIRATORY (INHALATION) at 02:04

## 2022-04-06 RX ADMIN — SODIUM CHLORIDE SOLN NEBU 3% 4 ML: 3 NEBU SOLN at 12:04

## 2022-04-06 RX ADMIN — MEROPENEM AND SODIUM CHLORIDE 500 MG: 500 INJECTION, SOLUTION INTRAVENOUS at 10:04

## 2022-04-06 RX ADMIN — GUAIFENESIN 200 MG: 100 SOLUTION ORAL at 05:04

## 2022-04-06 RX ADMIN — LORAZEPAM 0.5 MG: 0.5 TABLET ORAL at 08:04

## 2022-04-06 NOTE — PT/OT/SLP PROGRESS
Speech Language Pathology Treatment  Discharge Summary    Patient Name:  Carolina Baldwin   MRN:  [id number]  Admitting Diagnosis: Liver injury    Recommendations:                 General Recommendations:  Follow-up not indicated  Diet recommendations:  Regular, Liquid Diet Level: Thin   Aspiration Precautions: Standard aspiration precautions   General Precautions: Standard, aspiration, fall, respiratory, NPO  Communication strategies:  none    Subjective     Patient awake and alert. Seated in bedside chair upon per PT.     Pain/Comfort:  ·  No c/o pain    Respiratory Status: Room air    Objective:     Has the patient been evaluated by SLP for swallowing?   Yes  Keep patient NPO? No   Current Respiratory Status:        Patient seen for follow up diet assessment.  Patient tolerated thin liquids via straw sips x8 along with regular solids x1 with no overt signs of airway compromise. Patient with oral and pharyngeal phases of swallow appearing WFL.  Skilled education was provided to patient  re: diet recs, standard aspiration precautions of which to follow, and discharge ST plan of care.    Assessment:     Carolina Baldwin is a 36 y.o. female with an SLP diagnosis of no oropharyngeal dysphagia .      Goals:   Multidisciplinary Problems     SLP Goals        Problem: SLP    Goal Priority Disciplines Outcome   SLP Goal     SLP Ongoing, Progressing   Description: Speech Language Pathology Goals  Goals expected to be met by 4/16    1. Pt will participate in ongoing swallow assessment to determine safest and least restrictive PO diet.   2. Pt will complete cognitive-linguistic assessment to determine therapy goals.                          Plan:     · Patient to be seen:  4 x/week   · Plan of Care expires:  05/02/22  · Plan of Care reviewed with:  patient, significant other   · SLP Follow-Up:  No       Discharge recommendations:  other (see comments)   Barriers to Discharge:  None    Time Tracking:     SLP Treatment Date:    04/06/22  Speech Start Time:  1040  Speech Stop Time:  1048     Speech Total Time (min):  8 min    Billable Minutes: Treatment Swallowing Dysfunction 8    04/06/2022

## 2022-04-06 NOTE — PLAN OF CARE
CMICU DAILY GOALS       A: Awake    RASS: Goal - RASS Goal: 0-->alert and calm  Actual - RASS (Wan Agitation-Sedation Scale): 0-->alert and calm   Restraint necessity: Clinical Justification: Treatment Interference  B: Breathe   SBT: Not intubated   C: Coordinate A & B, analgesics/sedatives   Pain: managed    SAT: Not intubated  D: Delirium   CAM-ICU: Overall CAM-ICU: Negative  E: Early(intubated/ Progressive (non-intubated) Mobility   MOVE Screen: Pass   Activity: Activity Management: Patient unable to perform activities  FAS: Feeding/Nutrition   Diet order: Diet/Nutrition Received: tube feeding, Specialty Diet/Nutrition Received: other (see comments) (Isosource 1.5 continuos via NGT)  T: Thrombus   DVT prophylaxis: VTE Required Core Measure: Pharmacological prophylaxis initiated/maintained  H: HOB Elevation   Head of Bed (HOB) Positioning: HOB at 30-45 degrees  U: Ulcer Prophylaxis   GI: yes  G: Glucose control   managed    S: Skin   Bathing/Skin Care: bath, complete, dressed/undressed, incontinence care, linen changed  Device Skin Pressure Protection: absorbent pad utilized/changed, adhesive use limited, positioning supports utilized, pressure points protected, skin-to-device areas padded, skin-to-skin areas padded  Pressure Reduction Devices: positioning supports utilized, pressure-redistributing mattress utilized  Pressure Reduction Techniques: frequent weight shift encouraged  Skin Protection: adhesive use limited, incontinence pads utilized, skin-to-device areas padded, skin-to-skin areas padded, tubing/devices free from skin contact  B: Bowel Function   no issues   I: Indwelling Catheters   Perez necessity: [REMOVED]      Urethral Catheter 03/28/22 5928 Latex-Reason for Continuing Urinary Catheterization: Critically ill in ICU and requiring hourly monitoring of intake/output   CVC necessity: No  D: De-escalation Antibiotics   Yes    Family/Goals of care/Code Status   Code Status: Full Code    24H Vital  Sign Range  Temp:  [97.8 °F (36.6 °C)-98.1 °F (36.7 °C)]   Pulse:  []   Resp:  [14-73]   BP: ()/(62-83)   SpO2:  [91 %-100 %]      Shift Events   No acute events throughout shift. Pt on Bipap overnight. Tolerated well.     VS and assessment per flow sheet, patient progressing towards goals as tolerated, plan of care reviewed with family, all concerns addressed, will continue to monitor.

## 2022-04-06 NOTE — ASSESSMENT & PLAN NOTE
Acute hepatic metabolic encephalopathy  Elevated transaminases  Elevated total bilirubin    35 y/o F with PMHx IVDU, polysubstance abuse [place] [facility] [place] [facility] for hepatology evaluation. Initially presented with concerns for acute liver failure (encephalopathy, elevated elevated elevation of LFTs, jaundice) vs pSBO vs sepsis 2/2 abdominal infection. NGT decompression of pSBO with good response, advanced to CLD. Started on broad spectrum abx and tapered. Started on lactulose and rifaximin with good response of encephalopathy, now A&O x 4 without signs of asterixis, tongue fasciculations, hallucinations. Overall etiology may be 2/2 to extensive outpatient Tylenol use (states she used to take handfuls of Tylenol for her initial abdominal pain) vs other drug interactions vs cholestatic injury? [place] [facility] [place].    - LFTs remain elevated, but interval improvement; synthetic function intact  - Per hepatology recommendations, MRCP and possible liver biopsy pending; recommend obtaining inpatient as large potential for loss to follow up outpatient  - Hepatology consulted and following; appreciate assistance  - Acute hepatitis panel negative  - HIV negative  - US liver doppler / abdomen did not demonstrate appreciable biliary duct dilatation  - Lactulose 20 BID (titrate to 4-5 BMs per day)  - Rifaximin  - Ceruloplasmin wnl, Copper low  - ELIN, Anti-Sm negative  - PT/INR daily  - Ferritin elevated  - Anti alpha 1 trypsin unremarkable  - Ascites studies unremarkable for SBP  - Trend CMP daily  - Trend CBC daily  - Monitor neurological status

## 2022-04-06 NOTE — PROGRESS NOTES
[facility] - Cardiac Medical ICU  Critical Care Medicine  Progress Note    Patient Name: Carolina Baldwin  MRN: [id number]  Admission Date: 3/28/2022  Hospital Length of Stay: 9 days  Code Status: Full Code  Attending Provider: Aguilar Ferreira MD  Primary Care Provider: Primary Doctor No   Principal Problem: Liver injury    Subjective:     HPI:  35 y/o F with PMHx IVDU on suboxone, polysubstance use (meth, opioids, tobacco), gastric ulcers initially presented to OSH for increasing jaundice, lethargy, AMS, evaluation for pSBO, increased O2 requirement (3-4 L, no baseline home O2). Was started on broad spectrum Abx at OSH and eventually discontinued. Initially presented oriented x 2 and severe jaundice, elevated ammonia, but normal LFTs? Started on lactulose and rifaximin. NGT decompression at OSH for SBO with good response, advanced to CLD. [place] [facility] [place].     On [place] [facility], patient is overtly jaundiced but with intact mentation (a&o x4). Complains of lower back pain that she experienced from the long transport from OSH [place] [facility]. Also complains of increasing hunger and thirst (good appetite). Questionable historian, but understands the circumstance, stating she knows she is having new liver problems and is here for the hepatology team to evaluate her. Patient reports having good bowel movements and appropriate urinary voids. Specifically denies chest pain, increased work of breathing, increasing/worsening abdominal pain, fever/chills, nausea, vomiting, constipation, diarrhea, dysuria, suprapubic pain, recent IVDU (currently on suboxone), hallucinations (auditory and visual), tremors, seizures, loss of consciousness, headache, sensory/strength changes, abnormal bleeding.    Patient is from [place], [place]. Lives with boyfriend and 5 children. Of note, she calls her boyfriend her  but is not legally ; her closest relative is her sister. Substantial IVDU history (meth),  non-injectable opioid abuse. Recently stopped smoking history (smoked since 10 y/o). On suboxone per OSH report and per patient. Does not know if any active HIV, hepatitis infection. Family history of CAD, ACS, and cancer (unspecified).       Hospital/ICU Course:  Transferred from OSH [place] [facility] for H[place], hepatology evaluation, and SBO. On arrival, patient A&O x 4 with diffuse abdominal pain w/o rebound, on 5 L NC. CTAP demonstrated diffuse bowel air levels and dilatation c/w SBO. Bilateral lung effusions present with concerns for L lingula consolidation. AF VS HDS on presentation to [facility]. Bedside US without amenable ascites pocket for paracentesis. Gen Surg evaluated w/o need for surgical intervention. Started on CAP coverage for pulmonary findings. Lactulose titrated to 4-5 BM/day, rifaximin. Hepatology consulted and work up initiated. Patient desaturated on 5 L NC and subsequently stepped up to 10 L HFNC with eventual intubation. Found to be mucus plugging per CXR (L lung atelectasis with L midline shift). Levophed started during intubation, weaned off. Patient respiratory status improving. Bronch completed for mucus evacuation on 03/31. Extubated to NC with BiPAP on 04/01. Suction turned off to NGT per gen surg recs. Upgraded Cefepime coverage to Meropenem due to GNR+ Rcx while patient was intubated. Per OSH reports, there were previously concerns for esophageal candidiasis, as such, added Fluconazole. Increased airway mucus production, poor cough reflex. SLP swallow eval; NPO, TF per NGT in interim. Difficulty tolerating comfort flow trials due to poor cough reflex and inspiratory effort 2/2 deconditioning, however intermittent improvement through course. Rcx on 04/06 grew acinetobacter baumannii , pending drug sensitivities. ID following.      Interval History/Significant Events: AF HDS NAEON. Tolerated BiPAP well overnight. No other complaints. More responsive, better cough, better energy level.    Review  of Systems   Constitutional:  Positive for activity change and fatigue. Negative for chills, diaphoresis and fever.   HENT:  Positive for dental problem (all teeth removed previously d/t poor dentition). Negative for sneezing and sore throat.    Eyes:  Negative for pain and visual disturbance.   Respiratory:  Negative for cough, chest tightness, shortness of breath and wheezing.    Cardiovascular:  Negative for chest pain, palpitations and leg swelling.   Gastrointestinal:  Positive for abdominal distention. Negative for abdominal pain, blood in stool, constipation, diarrhea, nausea and vomiting.   Genitourinary:  Negative for dysuria, hematuria and urgency.   Musculoskeletal:  Negative for arthralgias and myalgias.   Skin:  Positive for color change (jaundice). Negative for rash.   Neurological:  Positive for weakness. Negative for tremors, syncope, light-headedness and numbness.   Psychiatric/Behavioral:  Negative for agitation and confusion.    Objective:     Vital Signs (Most Recent):  Temp: 98.2 °F (36.8 °C) (04/06/22 0800)  Pulse: 109 (04/06/22 1015)  Resp: (!) 35 (04/06/22 1015)  BP: 107/72 (04/06/22 1000)  SpO2: 99 % (04/06/22 1015)   Vital Signs (24h Range):  Temp:  [97.8 °F (36.6 °C)-98.2 °F (36.8 °C)] 98.2 °F (36.8 °C)  Pulse:  [] 109  Resp:  [14-73] 35  SpO2:  [90 %-100 %] 99 %  BP: ()/(51-82) 107/72   Weight: 21.9 kg (48 lb 4.5 oz)  Body mass index is 9.43 kg/m².      Intake/Output Summary (Last 24 hours) at 4/6/2022 1129  Last data filed at 4/6/2022 0930  Gross per 24 hour   Intake 928.88 ml   Output 250 ml   Net 678.88 ml       Physical Exam  Constitutional:       General: She is not in acute distress.     Appearance: She is ill-appearing. She is not toxic-appearing or diaphoretic.      Comments: Cachetic, temporal wasting, jaundiced, weak, but in no apparent distress   HENT:      Head: Normocephalic.      Comments: BiPAP, NGT     Nose: Nose normal.   Eyes:      General: Scleral icterus  present.      Extraocular Movements: Extraocular movements intact.      Pupils: Pupils are equal, round, and reactive to light.   Cardiovascular:      Rate and Rhythm: Regular rhythm. Tachycardia present.      Pulses: Normal pulses.      Heart sounds: Normal heart sounds. No murmur heard.  Pulmonary:      Effort: Pulmonary effort is normal. No respiratory distress.      Breath sounds: Normal breath sounds. No stridor. No wheezing or rales.      Comments: Poor cough reflex, transmitted upper airway noise  Abdominal:      General: Abdomen is flat. There is distension.      Palpations: Abdomen is soft.      Tenderness: There is no abdominal tenderness (improved). There is no guarding or rebound.   Musculoskeletal:         General: No swelling or tenderness. Normal range of motion.      Cervical back: Normal range of motion. No rigidity or tenderness.      Right lower leg: No edema.      Left lower leg: No edema.   Skin:     General: Skin is warm and dry.      Coloration: Skin is jaundiced.      Findings: Bruising and rash (petecchial rash on back) present.   Neurological:      General: No focal deficit present.      Mental Status: She is alert and oriented to person, place, and time. Mental status is at baseline.      Cranial Nerves: No cranial nerve deficit.      Sensory: No sensory deficit.      Motor: No weakness.   Psychiatric:         Mood and Affect: Mood normal.         Behavior: Behavior normal.         Thought Content: Thought content normal.       Vents:  Vent Mode: A/C (04/01/22 0705)  Ventilator Initiated: Yes (03/30/22 0141)  Set Rate: 16 BPM (04/01/22 0705)  Vt Set: 350 mL (04/01/22 0705)  Pressure Support: 0 cmH20 (04/01/22 0705)  PEEP/CPAP: 5 cmH20 (04/01/22 0705)  Oxygen Concentration (%): 45 (04/06/22 0600)  Peak Airway Pressure: 20 cmH2O (04/01/22 0705)  Plateau Pressure: 0 cmH20 (04/01/22 0705)  Total Ve: 9.02 mL (04/01/22 0705)  Negative Inspiratory Force (cm H2O): 0 (03/30/22 1552)  F/VT Ratio<105  (RSBI): (!) 65.45 (04/01/22 0705)  Lines/Drains/Airways       Peripherally Inserted Central Catheter Line  Duration             PICC Triple Lumen left basilic -- days                  Significant Labs:    CBC/Anemia Profile:  Recent Labs   Lab 04/05/22  0247 04/06/22  0430   WBC 13.87* 11.65   HGB 8.1* 7.7*   HCT 26.1* 25.3*    442   * 112*   RDW 21.2* 20.0*        Chemistries:  Recent Labs   Lab 04/05/22  0247 04/05/22  1554 04/06/22  0430    136 140   K 4.4 4.0 4.7   CL 93* 90* 96   CO2 37* 34* 34*   BUN 19 16 16   CREATININE 0.4* 0.4* 0.4*   CALCIUM 10.1 9.7 9.2   ALBUMIN 3.1* 3.0* 2.8*   PROT 7.0 6.9 6.2   BILITOT 5.1* 4.7* 4.4*   ALKPHOS 1,159* 1,096* 1,071*   ALT 58* 55* 48*   AST 38 41* 36   MG 1.9 1.8 1.8   PHOS 3.5 3.9 4.1       All pertinent labs within the past 24 hours have been reviewed.    Significant Imaging:  I have reviewed all pertinent imaging results/findings within the past 24 hours.      ABG  Recent Labs   Lab 04/04/22  1841   PH 7.412   PO2 29*   PCO2 61.5*   HCO3 39.2*   BE 15     Assessment/Plan:     Psychiatric  Polysubstance abuse  Opioid abuse  Tobacco abuse  Methamphetamine abuse    Noted to be on suboxone through suboxone clinic. Unclear historical path.    - Addiction Psych consult for suboxone; per their report, patient is not on suboxone regularly and not interested at this time  - Urine toxicology negative at this time  - HIV negative  - Hep panel negative    Pulmonary  Acute hypoxemic respiratory failure  Bilateral pleural effusions  Pleural consolidation, L  Atelectasis  Mucus Plugging    No baseline home O2 per patient. Presenting with 5 L NC O2. Unclear etiology, though previous documentation suggestive of pleural effusion vs component of PNA. Unclear etiology as patient seems to be volume down on exam. No reported history of heart failure or COPD (though extensive tobacco use history). Etiology may be 2/2 to abdominal distention causing diaphragmatic  pressure vs infectious etiology vs pleural effusion d/t third spacing vs infection? CT demonstrating bilateral pleural effusions and lingula consolidation; concerns for possible PNA. 03/29 patient decompensated while on 5L > 10 HFNC > intubation; CXR demonstrating L lung atelectasis / mucus plugging. 03/31 bronchoscopy for mucus evacuation; RCx sent, mild improvement on CXR following. Extubated to NC on 04/01 to BiPAP. Continue weaning supplemental O2 down; challenging given poor cough reflex and copious mucus production.    - Oxygen requirement appears largely 2/2 poor mechanical ventilation d/t deconditioning with component of mucus plugging d/t poor cough reflex  - Trial alternating BiPAP and NC/HF, BiPAP qhs  - RCx with acinetobacter baumannii , pending advanced therapeutic susceptibility.  - Meropenem for VAP coverage / Fluconazole for OSH report of esophageal candidiasis   - Aggressive pulmonary hygiene, CPT, albuterol inhaler, nebulized saline, cough assist  - Repeat CXR demonstrates relative increased lung volumes, resolving atelectasis  - Continuous pulse ox  - Monitor fever curve; given lack of infectious presentation, will defer abx course now  - Lactate wnl  - Monitor BCx, RCx  - Aspiration precaution    Cardiac/Vascular  CHF (congestive heart failure)  Patient presenting with BNP 1200+, Repeat TTE 03/29, EF 35-40%, diastolic dysfunction. PAP 34 mmHg. No WMA per echo. Initial CXR demonstrated concerns for congestion vs effusion.    - Clinically euvolemic now  - Holding Lasix for now given euvolemic vs hypovolemic state  - Strict I/Os  - GDMT at future time as tolerated     Paroxysmal tachycardia  Presenting with tachycardia to 120s. Noted in OSH previous progress note and was tempered with BB. Unclear etiology, potentially volume down driving sinus tachycardia as patient has been on NGT suction d/t pSBO with limited PO intake. Also appears to be extremely malnourished. In setting of potential acute  pulmonary process, may be related to acute infection vs mucus plugging / atelectasis    - Repeat EKG demonstrating sinus tachycardia and abnormal R wave progression; no previous EKG for comparison  - Cardiac telemetry  - Electrolyte trend, K > 4, Mg > 2  - Will hold trial of IVF bolus until further evaluation regarding volume status (BNP elevated 1000+ on admission)  - TTE revealing EF 35-40%, PAP 34,       GI  Chronic biliary pancreatitis  Cholelithiasis    Patient family reports that she has had 3+ year history of abdominal pain secondary that they believe is due to her pancreas. Since then, she has had decreasing PO intake and loss of weight due to abdominal discomfort. Family states that patient did not regularly seek treatment for this issue, but used Tylenol for pain. Recently admitted in 03/2022 for gallstone pancreatitis at [facility], s/p lap cholecystectomy, c/b pSBO.    - See Failure to Thrive A/P    Generalized abdominal pain  H/o pSBO  H/o laparoscopic cholecystectomy  H/o chronic biliary pancreatitis   Dysphagia    Patient with recent h/o pSBO at OSH,with good response to NGT decompression. Advanced to CLD. Initially complaining of RUQ abdominal pain at OSH on initial presentation. Now with mild, diffuse abdominal pain on palpation, but none at rest. Abdomen remains distended. Hypoactive bowel sounds. Repeat KUB / CTAP demonstrating significant bowel dilatation aw/ air-fluid levels c/w SBO. NGT suction d/c on 04/01 after extubation    - Monitor BM, diet tolerance (currently passing BM and tolerating TF)  - NPO pending SLP (poor cough), ADAT following; TF per NGT in interim  - General surgery evaluated, no role for surgical intervention  - Bedside US abdomen without note for ascites or other process  - Serial abdominal exams    Orthopedic  * Liver injury  Acute hepatic metabolic encephalopathy  Elevated transaminases  Elevated total bilirubin    35 y/o F with PMHx IVDU, polysubstance abuse  [place] [facility] [place] [facility] for hepatology evaluation. Initially presented with concerns for acute liver failure (encephalopathy, elevated elevated elevation of LFTs, jaundice) vs pSBO vs sepsis 2/2 abdominal infection. NGT decompression of pSBO with good response, advanced to CLD. Started on broad spectrum abx and tapered. Started on lactulose and rifaximin with good response of encephalopathy, now A&O x 4 without signs of asterixis, tongue fasciculations, hallucinations. Overall etiology may be 2/2 to extensive outpatient Tylenol use (states she used to take handfuls of Tylenol for her initial abdominal pain) vs other drug interactions vs cholestatic injury? [place] [facility] [place].    - LFTs remain elevated, but interval improvement; synthetic function intact  - Per hepatology recommendations, MRCP and possible liver biopsy pending; recommend obtaining inpatient as large potential for loss to follow up outpatient  - Hepatology consulted and following; appreciate assistance  - Acute hepatitis panel negative  - HIV negative  - US liver doppler / abdomen did not demonstrate appreciable biliary duct dilatation  - Lactulose 20 BID (titrate to 4-5 BMs per day)  - Rifaximin  - Ceruloplasmin wnl, Copper low  - ELIN, Anti-Sm negative  - PT/INR daily  - Ferritin elevated  - Anti alpha 1 trypsin unremarkable  - Ascites studies unremarkable for SBP  - Trend CMP daily  - Trend CBC daily  - Monitor neurological status    Other  Failure to thrive in adult  Temporal wasting  Malnutrition    Setting of 3 year history of chronic pancreatitis without treatment and recent admission for gallstone pancreatitis s/p lap jim c/b pSBO, patient has had significantly decreased PO intake in conjunction with regular meth use.    - Nutrition consult; TFs recs in documentation; will titrate up as tolerated       Critical Care Daily Checklist:    A: Awake: RASS Goal/Actual Goal: RASS  Goal: 0-->alert and calm  Actual: Wan Agitation Sedation Scale (RASS): Alert and calm   B: Spontaneous Breathing Trial Performed? Spon. Breathing Trial Initiated?: Initiated (04/01/22 1018)   C: SAT & SBT Coordinated?  N/A                      D: Delirium: CAM-ICU Overall CAM-ICU: Negative   E: Early Mobility Performed? Yes   F: Feeding Goal: Goals: Meet % EEN, EPN by RD f/u date  Status: Nutrition Goal Status: new   Current Diet Order   Procedures    Diet NPO      AS: Analgesia/Sedation N/A   T: Thromboembolic Prophylaxis Heparin   H: HOB > 300 Yes   U: Stress Ulcer Prophylaxis (if needed) N/A   G: Glucose Control N/A   B: Bowel Function Stool Occurrence: 1   I: Indwelling Catheter (Lines & Perez) Necessity PICC abx   D: De-escalation of Antimicrobials/Pharmacotherapies N/a will be upgrading abx    Plan for the day/ETD PT/OT for pulm re-conditioning, ABx regimen per new RCx results    Code Status:  Family/Goals of Care: Full Code         Critical secondary to Patient has a condition that poses threat to life and bodily function: Severe Respiratory Distress      Critical care was time spent personally by me on the following activities: development of treatment plan with patient or surrogate and bedside caregivers, discussions with consultants, evaluation of patient's response to treatment, examination of patient, ordering and performing treatments and interventions, ordering and review of laboratory studies, ordering and review of radiographic studies, pulse oximetry, re-evaluation of patient's condition. This critical care time did not overlap with that of any other provider or involve time for any procedures.     Geoffrey Gloria MD  Critical Care Medicine  [facility] - Cardiac Medical ICU

## 2022-04-06 NOTE — ASSESSMENT & PLAN NOTE
Bilateral pleural effusions  Pleural consolidation, L  Atelectasis  Mucus Plugging    No baseline home O2 per patient. Presenting with 5 L NC O2. Unclear etiology, though previous documentation suggestive of pleural effusion vs component of PNA. Unclear etiology as patient seems to be volume down on exam. No reported history of heart failure or COPD (though extensive tobacco use history). Etiology may be 2/2 to abdominal distention causing diaphragmatic pressure vs infectious etiology vs pleural effusion d/t third spacing vs infection? CT demonstrating bilateral pleural effusions and lingula consolidation; concerns for possible PNA. 03/29 patient decompensated while on 5L > 10 HFNC > intubation; CXR demonstrating L lung atelectasis / mucus plugging. 03/31 bronchoscopy for mucus evacuation; RCx sent, mild improvement on CXR following. Extubated to NC on 04/01 to BiPAP. Continue weaning supplemental O2 down; challenging given poor cough reflex and copious mucus production.    - Oxygen requirement appears largely 2/2 poor mechanical ventilation d/t deconditioning with component of mucus plugging d/t poor cough reflex  - Trial alternating BiPAP and NC/HF, BiPAP qhs  - RCx with acinetobacter baumannii , pending advanced therapeutic susceptibility.  - Meropenem for VAP coverage / Fluconazole for OSH report of esophageal candidiasis   - Aggressive pulmonary hygiene, CPT, albuterol inhaler, nebulized saline, cough assist  - Repeat CXR demonstrates relative increased lung volumes, resolving atelectasis  - Continuous pulse ox  - Monitor fever curve; given lack of infectious presentation, will defer abx course now  - Lactate wnl  - Monitor BCx, RCx  - Aspiration precaution

## 2022-04-06 NOTE — PLAN OF CARE
Recommendations     1. Continue current Full liquid diet + ONS. If/when medically able, advance diet to Regular. Encourage adequate PO intake as tolerated.  2. If more agressive means of nutrition warranted, please re-consult RD.  3. RD to monitor & follow-up.     Goals: Meet % EEN, EPN by RD f/u date  Nutrition Goal Status: progressing towards goal  Communication of RD Recs: reviewed with RN

## 2022-04-06 NOTE — SUBJECTIVE & OBJECTIVE
Interval History/Significant Events: AF HDS NAEON. Tolerated BiPAP well overnight. No other complaints. More responsive, better cough, better energy level.    Review of Systems   Constitutional:  Positive for activity change and fatigue. Negative for chills, diaphoresis and fever.   HENT:  Positive for dental problem (all teeth removed previously d/t poor dentition). Negative for sneezing and sore throat.    Eyes:  Negative for pain and visual disturbance.   Respiratory:  Negative for cough, chest tightness, shortness of breath and wheezing.    Cardiovascular:  Negative for chest pain, palpitations and leg swelling.   Gastrointestinal:  Positive for abdominal distention. Negative for abdominal pain, blood in stool, constipation, diarrhea, nausea and vomiting.   Genitourinary:  Negative for dysuria, hematuria and urgency.   Musculoskeletal:  Negative for arthralgias and myalgias.   Skin:  Positive for color change (jaundice). Negative for rash.   Neurological:  Positive for weakness. Negative for tremors, syncope, light-headedness and numbness.   Psychiatric/Behavioral:  Negative for agitation and confusion.    Objective:     Vital Signs (Most Recent):  Temp: 98.2 °F (36.8 °C) (04/06/22 0800)  Pulse: 109 (04/06/22 1015)  Resp: (!) 35 (04/06/22 1015)  BP: 107/72 (04/06/22 1000)  SpO2: 99 % (04/06/22 1015)   Vital Signs (24h Range):  Temp:  [97.8 °F (36.6 °C)-98.2 °F (36.8 °C)] 98.2 °F (36.8 °C)  Pulse:  [] 109  Resp:  [14-73] 35  SpO2:  [90 %-100 %] 99 %  BP: ()/(51-82) 107/72   Weight: 21.9 kg (48 lb 4.5 oz)  Body mass index is 9.43 kg/m².      Intake/Output Summary (Last 24 hours) at 4/6/2022 1129  Last data filed at 4/6/2022 0930  Gross per 24 hour   Intake 928.88 ml   Output 250 ml   Net 678.88 ml       Physical Exam  Constitutional:       General: She is not in acute distress.     Appearance: She is ill-appearing. She is not toxic-appearing or diaphoretic.      Comments: Cachetic, temporal wasting,  jaundiced, weak, but in no apparent distress   HENT:      Head: Normocephalic.      Comments: BiPAP, NGT     Nose: Nose normal.   Eyes:      General: Scleral icterus present.      Extraocular Movements: Extraocular movements intact.      Pupils: Pupils are equal, round, and reactive to light.   Cardiovascular:      Rate and Rhythm: Regular rhythm. Tachycardia present.      Pulses: Normal pulses.      Heart sounds: Normal heart sounds. No murmur heard.  Pulmonary:      Effort: Pulmonary effort is normal. No respiratory distress.      Breath sounds: Normal breath sounds. No stridor. No wheezing or rales.      Comments: Poor cough reflex, transmitted upper airway noise  Abdominal:      General: Abdomen is flat. There is distension.      Palpations: Abdomen is soft.      Tenderness: There is no abdominal tenderness (improved). There is no guarding or rebound.   Musculoskeletal:         General: No swelling or tenderness. Normal range of motion.      Cervical back: Normal range of motion. No rigidity or tenderness.      Right lower leg: No edema.      Left lower leg: No edema.   Skin:     General: Skin is warm and dry.      Coloration: Skin is jaundiced.      Findings: Bruising and rash (petecchial rash on back) present.   Neurological:      General: No focal deficit present.      Mental Status: She is alert and oriented to person, place, and time. Mental status is at baseline.      Cranial Nerves: No cranial nerve deficit.      Sensory: No sensory deficit.      Motor: No weakness.   Psychiatric:         Mood and Affect: Mood normal.         Behavior: Behavior normal.         Thought Content: Thought content normal.       Vents:  Vent Mode: A/C (04/01/22 0705)  Ventilator Initiated: Yes (03/30/22 0141)  Set Rate: 16 BPM (04/01/22 0705)  Vt Set: 350 mL (04/01/22 0705)  Pressure Support: 0 cmH20 (04/01/22 0705)  PEEP/CPAP: 5 cmH20 (04/01/22 0705)  Oxygen Concentration (%): 45 (04/06/22 0600)  Peak Airway Pressure: 20 cmH2O  (04/01/22 0705)  Plateau Pressure: 0 cmH20 (04/01/22 0705)  Total Ve: 9.02 mL (04/01/22 0705)  Negative Inspiratory Force (cm H2O): 0 (03/30/22 1552)  F/VT Ratio<105 (RSBI): (!) 65.45 (04/01/22 0705)  Lines/Drains/Airways       Peripherally Inserted Central Catheter Line  Duration             PICC Triple Lumen left basilic -- days                  Significant Labs:    CBC/Anemia Profile:  Recent Labs   Lab 04/05/22 0247 04/06/22  0430   WBC 13.87* 11.65   HGB 8.1* 7.7*   HCT 26.1* 25.3*    442   * 112*   RDW 21.2* 20.0*        Chemistries:  Recent Labs   Lab 04/05/22 0247 04/05/22  1554 04/06/22  0430    136 140   K 4.4 4.0 4.7   CL 93* 90* 96   CO2 37* 34* 34*   BUN 19 16 16   CREATININE 0.4* 0.4* 0.4*   CALCIUM 10.1 9.7 9.2   ALBUMIN 3.1* 3.0* 2.8*   PROT 7.0 6.9 6.2   BILITOT 5.1* 4.7* 4.4*   ALKPHOS 1,159* 1,096* 1,071*   ALT 58* 55* 48*   AST 38 41* 36   MG 1.9 1.8 1.8   PHOS 3.5 3.9 4.1       All pertinent labs within the past 24 hours have been reviewed.    Significant Imaging:  I have reviewed all pertinent imaging results/findings within the past 24 hours.

## 2022-04-06 NOTE — ASSESSMENT & PLAN NOTE
Nutrition Problem:  Severe Protein-Calorie Malnutrition  Malnutrition in the context of Social/Environmental Circumstances    Related to (etiology):  Inability to consume sufficient energy    Signs and Symptoms (as evidenced by):  Energy Intake: <75% of estimated energy requirement for > 3 months (suspected)  Body Fat Depletion: severe depletion of orbitals, triceps and thoracic and lumbar region   Muscle Mass Depletion: severe depletion of temples, clavicle region, scapular region, interosseous muscle and lower extremities   Weight Loss: 40% x 6 months    Interventions(treatment strategy):  Collaboration of nutrition care w/ other providers    Nutrition Diagnosis Status:  New

## 2022-04-06 NOTE — PROGRESS NOTES
[facility]  Infectious Disease  Progress Note    Patient Name: Carolina Baldwin  MRN: [id number]  Admission Date: 3/28/2022  Length of Stay: 9 days  Attending Physician: Aguilar Ferreira MD  Primary Care Provider: Primary Doctor No    Isolation Status: Contact  Assessment/Plan:      Pneumonia of both lower lobes due to infectious organism  Afebrile and with down trend in leukocytosis.  Expectorated sputum culture with a Gram-negative alec now identified as  Acinetobacter.  Unfortunately the sample was not collected during bronchoscopy procedure after mucus plugging.  Now off BiPAP and on 3 L nasal cannula.  · S patient has improved without culture directed therapy then suspect that the organism cultured is a colonizer.  · Discontinue meropenem.  · Start contact isolation precautions.  Do not remove isolation precautions unless cleared by infection control.  · If patient clinically deteriorates overnight vanc and start therapy with Bactrim.  Susceptibilities to minocycline have been requested.  · Will follow for another 24 hours to assess clinical response.    Esophageal candidiasis  Symptom medically improving.  · Continue fluconazole.  · Treat for 14-21 days.      Anticipated Disposition:  Per primary    Thank you for your consult. I will follow-up with patient. Please contact us if you have any additional questions.    Marina Patel MD  Infectious Disease  [facility]    Subjective:     Principal Problem:Liver injury    HPI: This is a 37 yo female with history of IVDU on suboxone, use of meth, opioids, and tobacco, and gastric ulcers who presented to OSH due to jaundice, AMS, and evaluation for partial SBO. Became increasingly hypoxic and was started on broad spectrum antibiotics which were then discontinued. Found to have elevated ammonia levels and given lactulose and rifaximin. CT a/p with contrast revealed markedly dilated fluid/contrast and air filled loops of  "proximal small bowel concerning for SBO. Also reported small bilateral pleural effusions with consolidative changes at the lung bases. Diffuse GGO. Also findings suggestive of hepatic steatosis. Transfer[place] [facility] [place]. Has been tachycardic with normal BP and currently on BiPAP. Blood cultures collected 3/29 with NGTD. Sputum culture growing GNR but no Staph aureus. Started on meropenem due to shortness of breath and swelling caused by penicillin, though she did tolerate cefepime 3/2022. Use of meropenem has prompted Infectious Diseases consult.     Interval History: "Better.  I was able to eat".  History possible noninvasive positive-pressure ventilation associated pneumonia to take his due to Gram-negative alec pending identification.  Up escalated to meropenem from cefepime by primary service.  Clinically improving and now off noninvasive ventilation.  Additionally with esophageal yeast infection diagnosed at transferring hospital.    Respiratory culture with Gram-negative alec now identified as  Acinetobacter.  Patient with decreasing oxygen requirements now on 3 L nasal cannula.    Review of Systems   Constitutional:  Negative for chills, fatigue, fever and unexpected weight change.   HENT:  Negative for ear pain, facial swelling, hearing loss, mouth sores, nosebleeds, rhinorrhea, sinus pressure, sore throat, tinnitus, trouble swallowing and voice change.    Eyes:  Negative for photophobia, pain, redness and visual disturbance.   Respiratory:  Negative for cough, chest tightness, shortness of breath and wheezing.    Cardiovascular:  Negative for chest pain, palpitations and leg swelling.   Gastrointestinal:  Negative for abdominal pain, blood in stool, constipation, diarrhea, nausea and vomiting.   Endocrine: Negative for cold intolerance, heat intolerance, polydipsia, polyphagia and polyuria.   Genitourinary:  Negative for decreased urine volume, dysuria, flank pain, frequency, hematuria, " menstrual problem, urgency, vaginal bleeding, vaginal discharge and vaginal pain.   Musculoskeletal:  Negative for arthralgias, back pain, joint swelling, myalgias and neck pain.   Skin:  Negative for rash.   Allergic/Immunologic: Negative for environmental allergies, food allergies and immunocompromised state.   Neurological:  Negative for dizziness, seizures, syncope, weakness, light-headedness, numbness and headaches.   Hematological:  Negative for adenopathy. Does not bruise/bleed easily.   Psychiatric/Behavioral:  Negative for confusion, hallucinations, self-injury, sleep disturbance and suicidal ideas. The patient is not nervous/anxious.    Objective:     Vital Signs (Most Recent):  Temp: 98.2 °F (36.8 °C) (04/06/22 1600)  Pulse: 106 (04/06/22 1830)  Resp: (!) 27 (04/06/22 1830)  BP: 118/71 (04/06/22 1830)  SpO2: 99 % (04/06/22 1830)   Vital Signs (24h Range):  Temp:  [97.8 °F (36.6 °C)-98.6 °F (37 °C)] 98.2 °F (36.8 °C)  Pulse:  [] 106  Resp:  [14-60] 27  SpO2:  [89 %-100 %] 99 %  BP: ()/(51-82) 118/71     Weight: 21.9 kg (48 lb 4.5 oz)  Body mass index is 9.43 kg/m².    Estimated Creatinine Clearance: 67.2 mL/min (A) (based on SCr of 0.4 mg/dL (L)).    Physical Exam  Vitals and nursing note reviewed.   Constitutional:       Appearance: She is well-developed. She is cachectic. She is ill-appearing.   HENT:      Head: Normocephalic and atraumatic.      Right Ear: External ear normal.      Left Ear: External ear normal.   Eyes:      General: Scleral icterus present.         Right eye: No discharge.         Left eye: No discharge.      Conjunctiva/sclera: Conjunctivae normal.   Cardiovascular:      Rate and Rhythm: Regular rhythm. Tachycardia present.      Heart sounds: Normal heart sounds. No murmur heard.    No friction rub. No gallop.   Pulmonary:      Effort: Pulmonary effort is normal. No respiratory distress.      Breath sounds: Normal breath sounds. No stridor. No wheezing or rales.    Abdominal:      General: Bowel sounds are normal.      Tenderness: There is no abdominal tenderness.   Musculoskeletal:         General: No tenderness. Normal range of motion.   Skin:     General: Skin is warm and dry.      Coloration: Skin is jaundiced.   Neurological:      Mental Status: She is alert and oriented to person, place, and time.       Significant Labs: Blood Culture:   Recent Labs   Lab 03/29/22  1117 03/29/22  1137   LABBLOO No growth after 5 days. No growth after 5 days.       BMP:   Recent Labs   Lab 04/06/22  1506   *   *   K 4.3   CL 91*   CO2 34*   BUN 13   CREATININE 0.4*   CALCIUM 9.2   MG 1.7       CBC:   Recent Labs   Lab 04/05/22  0247 04/06/22  0430   WBC 13.87* 11.65   HGB 8.1* 7.7*   HCT 26.1* 25.3*    442       Respiratory Culture:   Recent Labs   Lab 03/31/22  1333   GSRESP <10 epithelial cells per low power field.  Moderate WBC's   Few Gram negative rods   RESPIRATORYC No S aureus isolated.  ACINETOBACTER BAUMANNII   Many  Susceptibility pending  *         Significant Imaging: I have reviewed all pertinent imaging results/findings within the past 24 hours.

## 2022-04-06 NOTE — PROGRESS NOTES
[facility]  Adult Nutrition  Consult Note    SUMMARY     Recommendations    1. Continue current Full liquid diet + ONS. If/when medically able, advance diet to Regular. Encourage adequate PO intake as tolerated.  2. If more agressive means of nutrition warranted, please re-consult RD.  3. RD to monitor & follow-up.    Goals: Meet % EEN, EPN by RD f/u date  Nutrition Goal Status: progressing towards goal  Communication of RD Recs: reviewed with RN    Assessment and Plan    Severe malnutrition    Nutrition Problem:  Severe Protein-Calorie Malnutrition  Malnutrition in the context of Social/Environmental Circumstances    Related to (etiology):  Inability to consume sufficient energy    Signs and Symptoms (as evidenced by):  Energy Intake: <75% of estimated energy requirement for > 3 months (suspected)  Body Fat Depletion: severe depletion of orbitals, triceps and thoracic and lumbar region   Muscle Mass Depletion: severe depletion of temples, clavicle region, scapular region, interosseous muscle and lower extremities   Weight Loss: 40% x 6 months    Interventions(treatment strategy):  Collaboration of nutrition care w/ other providers    Nutrition Diagnosis Status:  New     Malnutrition Assessment    Weight Loss (Malnutrition): greater than 10% in 6 months  Energy Intake (Malnutrition): less than 75% for greater than or equal to 3 months   Orbital Region (Subcutaneous Fat Loss): severe depletion  Upper Arm Region (Subcutaneous Fat Loss): severe depletion   Lutheran Region (Muscle Loss): severe depletion  Clavicle Bone Region (Muscle Loss): severe depletion  Anterior Thigh Region (Muscle Loss): severe depletion     Reason for Assessment    Reason For Assessment: RD follow-up  Diagnosis: other (see comments) (Liver injury)  Relevant Medical History: IVDU  Interdisciplinary Rounds: attended    General Information Comments: Extubated 4/1. At time of visit this AM, pt was NPO. Pt now w/ full liquid  "diet ordered + ONS. NGT removed & TFs discontinued. SLP rec's Regular diet w/ thin liquids yesterday. Pt reports decreased appetite PTA x "few weeks" (RD suspects longer than that) & UBW of 80#. NFPE complete 3/31 - pt w/ severe fat & muscle wasting. Pt meets criteria for severe malnutrition. Please see PES statement for details.  Nutrition Discharge Planning: Pending clinical course    Nutrition/Diet History    Spiritual, Cultural Beliefs, Christian Practices, Values that Affect Care: no  Factors Affecting Nutritional Intake: other (see comments) (Diet just advanced)    Anthropometrics    Temp: 98.6 °F (37 °C)  Height Method: Measured  Height: 5' (152.4 cm)  Height (inches): 60 in  Weight Method: Bed Scale  Weight: 21.9 kg (48 lb 4.5 oz)  Weight (lb): (!) 48.28 lb  Ideal Body Weight (IBW), Female: 100 lb  % Ideal Body Weight, Female (lb): 48.28 %  BMI (Calculated): 9.4  BMI Grade: less than 16 protein-energy malnutrition grade III  Usual Body Weight (UBW), k.4 kg  % Usual Body Weight: 60.29  % Weight Change From Usual Weight: -39.84 %    Lab/Procedures/Meds    Pertinent Labs Reviewed: reviewed  Pertinent Labs Comments: -  Pertinent Medications Reviewed: reviewed  Pertinent Medications Comments: -    Estimated/Assessed Needs    Weight Used For Calorie Calculations: 22 kg (48 lb 8 oz)     Energy Calorie Requirements (kcal): 990-1100 kcal/d  Energy Need Method: Kcal/kg (45-50 kcal/kg)     Protein Requirements: 44 g/d (2 g/kg)  Weight Used For Protein Calculations: 22 kg (48 lb 8 oz)     Estimated Fluid Requirement Method: other (see comments) (Per MD or 1 mL/kcal)  RDA Method (mL): 990    Nutrition Prescription Ordered    Current Diet Order: Full liquids  Nutrition Order Comments: Boost Glucose Control  Current Nutrition Support Formula Ordered: Other (Comment) (Discontinued)    Evaluation of Received Nutrient/Fluid Intake    I/O: -4.8L since admit    Comments: LBM: 4/5    Nutrition Risk    Level of " Risk/Frequency of Follow-up: (1x/week)     Monitor and Evaluation    Food and Nutrient Intake: energy intake, food and beverage intake, enteral nutrition intake  Food and Nutrient Adminstration: enteral and parenteral nutrition administration, diet order  Physical Activity and Function: nutrition-related ADLs and IADLs  Anthropometric Measurements: weight, weight change  Biochemical Data, Medical Tests and Procedures: inflammatory profile, lipid profile, glucose/endocrine profile, gastrointestinal profile, electrolyte and renal panel  Nutrition-Focused Physical Findings: overall appearance     Nutrition Follow-Up    RD Follow-up?: Yes

## 2022-04-06 NOTE — ASSESSMENT & PLAN NOTE
Afebrile and with down trend in leukocytosis.  Expectorated sputum culture with a Gram-negative alec now identified as  Acinetobacter.  Unfortunately the sample was not collected during bronchoscopy procedure after mucus plugging.  Now off BiPAP and on 3 L nasal cannula.  · S patient has improved without culture directed therapy then suspect that the organism cultured is a colonizer.  · Discontinue meropenem.  · Start contact isolation precautions.  Do not remove isolation precautions unless cleared by infection control.  · If patient clinically deteriorates overnight vanc and start therapy with Bactrim.  Susceptibilities to minocycline have been requested.  · Will follow for another 24 hours to assess clinical response.

## 2022-04-06 NOTE — SUBJECTIVE & OBJECTIVE
"Interval History: "Better.  I was able to eat".  History possible noninvasive positive-pressure ventilation associated pneumonia to take his due to Gram-negative alec pending identification.  Up escalated to meropenem from cefepime by primary service.  Clinically improving and now off noninvasive ventilation.  Additionally with esophageal yeast infection diagnosed at transferring hospital.    Respiratory culture with Gram-negative alec now identified as  Acinetobacter.  Patient with decreasing oxygen requirements now on 3 L nasal cannula.    Review of Systems   Constitutional:  Negative for chills, fatigue, fever and unexpected weight change.   HENT:  Negative for ear pain, facial swelling, hearing loss, mouth sores, nosebleeds, rhinorrhea, sinus pressure, sore throat, tinnitus, trouble swallowing and voice change.    Eyes:  Negative for photophobia, pain, redness and visual disturbance.   Respiratory:  Negative for cough, chest tightness, shortness of breath and wheezing.    Cardiovascular:  Negative for chest pain, palpitations and leg swelling.   Gastrointestinal:  Negative for abdominal pain, blood in stool, constipation, diarrhea, nausea and vomiting.   Endocrine: Negative for cold intolerance, heat intolerance, polydipsia, polyphagia and polyuria.   Genitourinary:  Negative for decreased urine volume, dysuria, flank pain, frequency, hematuria, menstrual problem, urgency, vaginal bleeding, vaginal discharge and vaginal pain.   Musculoskeletal:  Negative for arthralgias, back pain, joint swelling, myalgias and neck pain.   Skin:  Negative for rash.   Allergic/Immunologic: Negative for environmental allergies, food allergies and immunocompromised state.   Neurological:  Negative for dizziness, seizures, syncope, weakness, light-headedness, numbness and headaches.   Hematological:  Negative for adenopathy. Does not bruise/bleed easily.   Psychiatric/Behavioral:  Negative for confusion, hallucinations, " self-injury, sleep disturbance and suicidal ideas. The patient is not nervous/anxious.    Objective:     Vital Signs (Most Recent):  Temp: 98.2 °F (36.8 °C) (04/06/22 1600)  Pulse: 106 (04/06/22 1830)  Resp: (!) 27 (04/06/22 1830)  BP: 118/71 (04/06/22 1830)  SpO2: 99 % (04/06/22 1830)   Vital Signs (24h Range):  Temp:  [97.8 °F (36.6 °C)-98.6 °F (37 °C)] 98.2 °F (36.8 °C)  Pulse:  [] 106  Resp:  [14-60] 27  SpO2:  [89 %-100 %] 99 %  BP: ()/(51-82) 118/71     Weight: 21.9 kg (48 lb 4.5 oz)  Body mass index is 9.43 kg/m².    Estimated Creatinine Clearance: 67.2 mL/min (A) (based on SCr of 0.4 mg/dL (L)).    Physical Exam  Vitals and nursing note reviewed.   Constitutional:       Appearance: She is well-developed. She is cachectic. She is ill-appearing.   HENT:      Head: Normocephalic and atraumatic.      Right Ear: External ear normal.      Left Ear: External ear normal.   Eyes:      General: Scleral icterus present.         Right eye: No discharge.         Left eye: No discharge.      Conjunctiva/sclera: Conjunctivae normal.   Cardiovascular:      Rate and Rhythm: Regular rhythm. Tachycardia present.      Heart sounds: Normal heart sounds. No murmur heard.    No friction rub. No gallop.   Pulmonary:      Effort: Pulmonary effort is normal. No respiratory distress.      Breath sounds: Normal breath sounds. No stridor. No wheezing or rales.   Abdominal:      General: Bowel sounds are normal.      Tenderness: There is no abdominal tenderness.   Musculoskeletal:         General: No tenderness. Normal range of motion.   Skin:     General: Skin is warm and dry.      Coloration: Skin is jaundiced.   Neurological:      Mental Status: She is alert and oriented to person, place, and time.       Significant Labs: Blood Culture:   Recent Labs   Lab 03/29/22  1117 03/29/22  1137   LABBLOO No growth after 5 days. No growth after 5 days.       BMP:   Recent Labs   Lab 04/06/22  1506   *   *   K 4.3   CL  91*   CO2 34*   BUN 13   CREATININE 0.4*   CALCIUM 9.2   MG 1.7       CBC:   Recent Labs   Lab 04/05/22  0247 04/06/22  0430   WBC 13.87* 11.65   HGB 8.1* 7.7*   HCT 26.1* 25.3*    442       Respiratory Culture:   Recent Labs   Lab 03/31/22  1333   GSRESP <10 epithelial cells per low power field.  Moderate WBC's   Few Gram negative rods   RESPIRATORYC No S aureus isolated.  ACINETOBACTER BAUMANNII   Many  Susceptibility pending  *         Significant Imaging: I have reviewed all pertinent imaging results/findings within the past 24 hours.

## 2022-04-07 PROBLEM — D64.9 ANEMIA REQUIRING TRANSFUSIONS: Status: ACTIVE | Noted: 2022-04-07

## 2022-04-07 LAB
ABO + RH BLD: NORMAL
ALBUMIN SERPL BCP-MCNC: 2.5 G/DL (ref 3.5–5.2)
ALBUMIN SERPL BCP-MCNC: 2.6 G/DL (ref 3.5–5.2)
ALP SERPL-CCNC: 1023 U/L (ref 55–135)
ALP SERPL-CCNC: 985 U/L (ref 55–135)
ALT SERPL W/O P-5'-P-CCNC: 45 U/L (ref 10–44)
ALT SERPL W/O P-5'-P-CCNC: 47 U/L (ref 10–44)
ANION GAP SERPL CALC-SCNC: 11 MMOL/L (ref 8–16)
ANION GAP SERPL CALC-SCNC: 9 MMOL/L (ref 8–16)
ANISOCYTOSIS BLD QL SMEAR: SLIGHT
AST SERPL-CCNC: 39 U/L (ref 10–40)
AST SERPL-CCNC: 44 U/L (ref 10–40)
BACTERIA SPEC AEROBE CULT: ABNORMAL
BACTERIA SPEC AEROBE CULT: ABNORMAL
BASOPHILS # BLD AUTO: 0.02 K/UL (ref 0–0.2)
BASOPHILS # BLD AUTO: ABNORMAL K/UL (ref 0–0.2)
BASOPHILS NFR BLD: 0 % (ref 0–1.9)
BASOPHILS NFR BLD: 0.3 % (ref 0–1.9)
BILIRUB SERPL-MCNC: 3.5 MG/DL (ref 0.1–1)
BILIRUB SERPL-MCNC: 3.8 MG/DL (ref 0.1–1)
BLD GP AB SCN CELLS X3 SERPL QL: NORMAL
BUN SERPL-MCNC: 14 MG/DL (ref 6–20)
BUN SERPL-MCNC: 14 MG/DL (ref 6–20)
CALCIUM SERPL-MCNC: 8.3 MG/DL (ref 8.7–10.5)
CALCIUM SERPL-MCNC: 8.9 MG/DL (ref 8.7–10.5)
CHLORIDE SERPL-SCNC: 93 MMOL/L (ref 95–110)
CHLORIDE SERPL-SCNC: 95 MMOL/L (ref 95–110)
CO2 SERPL-SCNC: 31 MMOL/L (ref 23–29)
CO2 SERPL-SCNC: 32 MMOL/L (ref 23–29)
CREAT SERPL-MCNC: 0.4 MG/DL (ref 0.5–1.4)
CREAT SERPL-MCNC: 0.4 MG/DL (ref 0.5–1.4)
DIFFERENTIAL METHOD: ABNORMAL
DIFFERENTIAL METHOD: ABNORMAL
EOSINOPHIL # BLD AUTO: 0 K/UL (ref 0–0.5)
EOSINOPHIL # BLD AUTO: ABNORMAL K/UL (ref 0–0.5)
EOSINOPHIL NFR BLD: 0 % (ref 0–8)
EOSINOPHIL NFR BLD: 0.3 % (ref 0–8)
ERYTHROCYTE [DISTWIDTH] IN BLOOD BY AUTOMATED COUNT: 19.1 % (ref 11.5–14.5)
ERYTHROCYTE [DISTWIDTH] IN BLOOD BY AUTOMATED COUNT: 19.6 % (ref 11.5–14.5)
EST. GFR  (AFRICAN AMERICAN): >60 ML/MIN/1.73 M^2
EST. GFR  (AFRICAN AMERICAN): >60 ML/MIN/1.73 M^2
EST. GFR  (NON AFRICAN AMERICAN): >60 ML/MIN/1.73 M^2
EST. GFR  (NON AFRICAN AMERICAN): >60 ML/MIN/1.73 M^2
GLUCOSE SERPL-MCNC: 129 MG/DL (ref 70–110)
GLUCOSE SERPL-MCNC: 85 MG/DL (ref 70–110)
GRAM STN SPEC: ABNORMAL
HCT VFR BLD AUTO: 22.5 % (ref 37–48.5)
HCT VFR BLD AUTO: 23.2 % (ref 37–48.5)
HGB BLD-MCNC: 6.9 G/DL (ref 12–16)
HGB BLD-MCNC: 7 G/DL (ref 12–16)
IMM GRANULOCYTES # BLD AUTO: 0.1 K/UL (ref 0–0.04)
IMM GRANULOCYTES # BLD AUTO: ABNORMAL K/UL (ref 0–0.04)
IMM GRANULOCYTES NFR BLD AUTO: 1.3 % (ref 0–0.5)
IMM GRANULOCYTES NFR BLD AUTO: ABNORMAL % (ref 0–0.5)
INR PPP: 1 (ref 0.8–1.2)
LYMPHOCYTES # BLD AUTO: 1.9 K/UL (ref 1–4.8)
LYMPHOCYTES # BLD AUTO: ABNORMAL K/UL (ref 1–4.8)
LYMPHOCYTES NFR BLD: 18.7 % (ref 18–48)
LYMPHOCYTES NFR BLD: 24 % (ref 18–48)
MAGNESIUM SERPL-MCNC: 1.8 MG/DL (ref 1.6–2.6)
MAGNESIUM SERPL-MCNC: 2.4 MG/DL (ref 1.6–2.6)
MCH RBC QN AUTO: 32.9 PG (ref 27–31)
MCH RBC QN AUTO: 32.9 PG (ref 27–31)
MCHC RBC AUTO-ENTMCNC: 30.2 G/DL (ref 32–36)
MCHC RBC AUTO-ENTMCNC: 30.7 G/DL (ref 32–36)
MCV RBC AUTO: 107 FL (ref 82–98)
MCV RBC AUTO: 109 FL (ref 82–98)
METAMYELOCYTES NFR BLD MANUAL: 0.7 %
MONOCYTES # BLD AUTO: 0.6 K/UL (ref 0.3–1)
MONOCYTES # BLD AUTO: ABNORMAL K/UL (ref 0.3–1)
MONOCYTES NFR BLD: 5.3 % (ref 4–15)
MONOCYTES NFR BLD: 7.6 % (ref 4–15)
MYELOCYTES NFR BLD MANUAL: 0.7 %
NEUTROPHILS # BLD AUTO: 5.3 K/UL (ref 1.8–7.7)
NEUTROPHILS NFR BLD: 66.5 % (ref 38–73)
NEUTROPHILS NFR BLD: 74.6 % (ref 38–73)
NRBC BLD-RTO: 0 /100 WBC
NRBC BLD-RTO: 0 /100 WBC
PETH 16:0/18.1 (POPETH): <10 NG/ML
PETH 16:0/18.2 (PLPETH): <10 NG/ML
PHOSPHATE SERPL-MCNC: 4.1 MG/DL (ref 2.7–4.5)
PHOSPHATE SERPL-MCNC: 4.1 MG/DL (ref 2.7–4.5)
PLATELET # BLD AUTO: 446 K/UL (ref 150–450)
PLATELET # BLD AUTO: 487 K/UL (ref 150–450)
PLATELET BLD QL SMEAR: ABNORMAL
PMV BLD AUTO: 10.9 FL (ref 9.2–12.9)
PMV BLD AUTO: 10.9 FL (ref 9.2–12.9)
POTASSIUM SERPL-SCNC: 3.8 MMOL/L (ref 3.5–5.1)
POTASSIUM SERPL-SCNC: 4.4 MMOL/L (ref 3.5–5.1)
PROT SERPL-MCNC: 5.6 G/DL (ref 6–8.4)
PROT SERPL-MCNC: 5.7 G/DL (ref 6–8.4)
PROTHROMBIN TIME: 10.4 SEC (ref 9–12.5)
RBC # BLD AUTO: 2.1 M/UL (ref 4–5.4)
RBC # BLD AUTO: 2.13 M/UL (ref 4–5.4)
SODIUM SERPL-SCNC: 135 MMOL/L (ref 136–145)
SODIUM SERPL-SCNC: 136 MMOL/L (ref 136–145)
TOXIC GRANULES BLD QL SMEAR: PRESENT
WBC # BLD AUTO: 7.76 K/UL (ref 3.9–12.7)
WBC # BLD AUTO: 7.92 K/UL (ref 3.9–12.7)

## 2022-04-07 PROCEDURE — 99233 SBSQ HOSP IP/OBS HIGH 50: CPT | Mod: ,,, | Performed by: EMERGENCY MEDICINE

## 2022-04-07 PROCEDURE — 25000003 PHARM REV CODE 250

## 2022-04-07 PROCEDURE — 99900035 HC TECH TIME PER 15 MIN (STAT)

## 2022-04-07 PROCEDURE — 85610 PROTHROMBIN TIME: CPT | Performed by: STUDENT IN AN ORGANIZED HEALTH CARE EDUCATION/TRAINING PROGRAM

## 2022-04-07 PROCEDURE — 27000207 HC ISOLATION

## 2022-04-07 PROCEDURE — 99223 PR INITIAL HOSPITAL CARE,LEVL III: ICD-10-PCS | Mod: ,,, | Performed by: SURGERY

## 2022-04-07 PROCEDURE — 84100 ASSAY OF PHOSPHORUS: CPT | Mod: 91

## 2022-04-07 PROCEDURE — 86920 COMPATIBILITY TEST SPIN: CPT | Performed by: STUDENT IN AN ORGANIZED HEALTH CARE EDUCATION/TRAINING PROGRAM

## 2022-04-07 PROCEDURE — 83735 ASSAY OF MAGNESIUM: CPT

## 2022-04-07 PROCEDURE — 85025 COMPLETE CBC W/AUTO DIFF WBC: CPT | Mod: 91 | Performed by: STUDENT IN AN ORGANIZED HEALTH CARE EDUCATION/TRAINING PROGRAM

## 2022-04-07 PROCEDURE — 25000003 PHARM REV CODE 250: Performed by: STUDENT IN AN ORGANIZED HEALTH CARE EDUCATION/TRAINING PROGRAM

## 2022-04-07 PROCEDURE — 97530 THERAPEUTIC ACTIVITIES: CPT

## 2022-04-07 PROCEDURE — 85025 COMPLETE CBC W/AUTO DIFF WBC: CPT | Performed by: EMERGENCY MEDICINE

## 2022-04-07 PROCEDURE — 20000000 HC ICU ROOM

## 2022-04-07 PROCEDURE — 99233 PR SUBSEQUENT HOSPITAL CARE,LEVL III: ICD-10-PCS | Mod: ,,, | Performed by: EMERGENCY MEDICINE

## 2022-04-07 PROCEDURE — 27000221 HC OXYGEN, UP TO 24 HOURS

## 2022-04-07 PROCEDURE — S4991 NICOTINE PATCH NONLEGEND: HCPCS

## 2022-04-07 PROCEDURE — 85027 COMPLETE CBC AUTOMATED: CPT

## 2022-04-07 PROCEDURE — 85007 BL SMEAR W/DIFF WBC COUNT: CPT

## 2022-04-07 PROCEDURE — 99223 1ST HOSP IP/OBS HIGH 75: CPT | Mod: ,,, | Performed by: SURGERY

## 2022-04-07 PROCEDURE — 86901 BLOOD TYPING SEROLOGIC RH(D): CPT | Performed by: EMERGENCY MEDICINE

## 2022-04-07 PROCEDURE — 94660 CPAP INITIATION&MGMT: CPT

## 2022-04-07 PROCEDURE — 63600175 PHARM REV CODE 636 W HCPCS

## 2022-04-07 PROCEDURE — 94761 N-INVAS EAR/PLS OXIMETRY MLT: CPT

## 2022-04-07 PROCEDURE — 94640 AIRWAY INHALATION TREATMENT: CPT

## 2022-04-07 PROCEDURE — 82607 VITAMIN B-12: CPT

## 2022-04-07 PROCEDURE — 82746 ASSAY OF FOLIC ACID SERUM: CPT

## 2022-04-07 PROCEDURE — 36415 COLL VENOUS BLD VENIPUNCTURE: CPT | Performed by: EMERGENCY MEDICINE

## 2022-04-07 PROCEDURE — 97535 SELF CARE MNGMENT TRAINING: CPT

## 2022-04-07 PROCEDURE — 80053 COMPREHEN METABOLIC PANEL: CPT

## 2022-04-07 PROCEDURE — 25000242 PHARM REV CODE 250 ALT 637 W/ HCPCS

## 2022-04-07 RX ORDER — PANTOPRAZOLE SODIUM 40 MG/1
40 TABLET, DELAYED RELEASE ORAL DAILY
Status: DISCONTINUED | OUTPATIENT
Start: 2022-04-07 | End: 2022-04-10 | Stop reason: HOSPADM

## 2022-04-07 RX ORDER — LIDOCAINE 50 MG/G
1 PATCH TOPICAL
Status: DISCONTINUED | OUTPATIENT
Start: 2022-04-07 | End: 2022-04-10 | Stop reason: HOSPADM

## 2022-04-07 RX ORDER — MAGNESIUM SULFATE HEPTAHYDRATE 40 MG/ML
2 INJECTION, SOLUTION INTRAVENOUS ONCE
Status: COMPLETED | OUTPATIENT
Start: 2022-04-07 | End: 2022-04-07

## 2022-04-07 RX ORDER — DICYCLOMINE HYDROCHLORIDE 10 MG/1
10 CAPSULE ORAL ONCE
Status: COMPLETED | OUTPATIENT
Start: 2022-04-07 | End: 2022-04-07

## 2022-04-07 RX ORDER — POLYETHYLENE GLYCOL 3350 17 G/17G
17 POWDER, FOR SOLUTION ORAL DAILY
Status: DISCONTINUED | OUTPATIENT
Start: 2022-04-07 | End: 2022-04-07

## 2022-04-07 RX ADMIN — HEPARIN SODIUM 5000 UNITS: 5000 INJECTION INTRAVENOUS; SUBCUTANEOUS at 08:04

## 2022-04-07 RX ADMIN — GUAIFENESIN 200 MG: 100 SOLUTION ORAL at 06:04

## 2022-04-07 RX ADMIN — ALBUTEROL SULFATE 2.5 MG: 2.5 SOLUTION RESPIRATORY (INHALATION) at 06:04

## 2022-04-07 RX ADMIN — MAGNESIUM SULFATE 2 G: 2 INJECTION INTRAVENOUS at 09:04

## 2022-04-07 RX ADMIN — SODIUM CHLORIDE SOLN NEBU 3% 4 ML: 3 NEBU SOLN at 07:04

## 2022-04-07 RX ADMIN — Medication 6 MG: at 08:04

## 2022-04-07 RX ADMIN — LIDOCAINE 1 PATCH: 50 PATCH CUTANEOUS at 01:04

## 2022-04-07 RX ADMIN — ALBUTEROL SULFATE 2.5 MG: 2.5 SOLUTION RESPIRATORY (INHALATION) at 07:04

## 2022-04-07 RX ADMIN — LORAZEPAM 0.5 MG: 0.5 TABLET ORAL at 08:04

## 2022-04-07 RX ADMIN — HYDROXYZINE HYDROCHLORIDE 25 MG: 25 TABLET ORAL at 11:04

## 2022-04-07 RX ADMIN — DICYCLOMINE HYDROCHLORIDE 10 MG: 10 CAPSULE ORAL at 10:04

## 2022-04-07 RX ADMIN — SODIUM CHLORIDE SOLN NEBU 3% 4 ML: 3 NEBU SOLN at 06:04

## 2022-04-07 RX ADMIN — IBUPROFEN 400 MG: 400 TABLET, FILM COATED ORAL at 08:04

## 2022-04-07 RX ADMIN — HYDROXYZINE HYDROCHLORIDE 25 MG: 25 TABLET ORAL at 03:04

## 2022-04-07 RX ADMIN — SODIUM CHLORIDE SOLN NEBU 3% 4 ML: 3 NEBU SOLN at 11:04

## 2022-04-07 RX ADMIN — ALBUTEROL SULFATE 2.5 MG: 2.5 SOLUTION RESPIRATORY (INHALATION) at 11:04

## 2022-04-07 RX ADMIN — ONDANSETRON 4 MG: 2 INJECTION INTRAMUSCULAR; INTRAVENOUS at 10:04

## 2022-04-07 RX ADMIN — PANTOPRAZOLE SODIUM 40 MG: 40 TABLET, DELAYED RELEASE ORAL at 08:04

## 2022-04-07 RX ADMIN — ALBUTEROL SULFATE 2.5 MG: 2.5 SOLUTION RESPIRATORY (INHALATION) at 12:04

## 2022-04-07 RX ADMIN — ALBUTEROL SULFATE 2.5 MG: 2.5 SOLUTION RESPIRATORY (INHALATION) at 02:04

## 2022-04-07 RX ADMIN — FLUCONAZOLE 150 MG: 40 POWDER, FOR SUSPENSION ORAL at 08:04

## 2022-04-07 RX ADMIN — NICOTINE 1 PATCH: 14 PATCH, EXTENDED RELEASE TRANSDERMAL at 08:04

## 2022-04-07 RX ADMIN — HYDROXYZINE HYDROCHLORIDE 25 MG: 25 TABLET ORAL at 01:04

## 2022-04-07 RX ADMIN — SODIUM CHLORIDE SOLN NEBU 3% 4 ML: 3 NEBU SOLN at 12:04

## 2022-04-07 NOTE — PROGRESS NOTES
[facility] - Cardiac Medical ICU  Critical Care Medicine  Progress Note    Patient Name: Carolina Baldwin  MRN: [id number]  Admission Date: 3/28/2022  Hospital Length of Stay: 10 days  Code Status: Full Code  Attending Provider: Aguilar Ferreira MD  Primary Care Provider: Primary Doctor No   Principal Problem: Liver injury    Subjective:     HPI:  37 y/o F with PMHx IVDU on suboxone, polysubstance use (meth, opioids, tobacco), gastric ulcers initially presented to OSH for increasing jaundice, lethargy, AMS, evaluation for pSBO, increased O2 requirement (3-4 L, no baseline home O2). Was started on broad spectrum Abx at OSH and eventually discontinued. Initially presented oriented x 2 and severe jaundice, elevated ammonia, but normal LFTs? Started on lactulose and rifaximin. NGT decompression at OSH for SBO with good response, advanced to CLD. Transferred to [facility] for hepatology evaluation.     On presentation to [facility] MICU, patient is overtly jaundiced but with intact mentation (a&o x4). Complains of lower back pain that she experienced from the long transport from OSH to [facility]. Also complains of increasing hunger and thirst (good appetite). Questionable historian, but understands the circumstance, stating she knows she is having new liver problems and is here for the hepatology team to evaluate her. Patient reports having good bowel movements and appropriate urinary voids. Specifically denies chest pain, increased work of breathing, increasing/worsening abdominal pain, fever/chills, nausea, vomiting, constipation, diarrhea, dysuria, suprapubic pain, recent IVDU (currently on suboxone), hallucinations (auditory and visual), tremors, seizures, loss of consciousness, headache, sensory/strength changes, abnormal bleeding.    Patient is from [place], [place]. Lives with boyfriend and 5 children. Of note, she calls her boyfriend her  but is not legally ; her closest relative is her sister. Substantial IVDU history  (meth), non-injectable opioid abuse. Recently stopped smoking history (smoked since 10 y/o). On suboxone per OSH report and per patient. Does not know if any active HIV, hepatitis infection. Family history of CAD, ACS, and cancer (unspecified).       Hospital/ICU Course:  Transferred from OSH to [facility] MICU for HLOC, hepatology evaluation, and SBO. On arrival, patient A&O x 4 with diffuse abdominal pain w/o rebound, on 5 L NC. CTAP demonstrated diffuse bowel air levels and dilatation c/w SBO. Bilateral lung effusions present with concerns for L lingula consolidation. AF VS HDS on presentation to [facility]. Bedside US without amenable ascites pocket for paracentesis. Gen Surg evaluated w/o need for surgical intervention. Started on CAP coverage for pulmonary findings. Lactulose titrated to 4-5 BM/day, rifaximin. Hepatology consulted and work up initiated. Patient desaturated on 5 L NC and subsequently stepped up to 10 L HFNC with eventual intubation. Found to be mucus plugging per CXR (L lung atelectasis with L midline shift). Levophed started during intubation, weaned off. Patient respiratory status improving. Bronch completed for mucus evacuation on 03/31. Extubated to NC with BiPAP on 04/01. Suction turned off to NGT per gen surg recs. Upgraded Cefepime coverage to Meropenem due to GNR+ Rcx while patient was intubated. Per OSH reports, there were previously concerns for esophageal candidiasis, as such, added Fluconazole. Increased airway mucus production, poor cough reflex. SLP swallow eval; NPO, TF per NGT in interim. Difficulty tolerating comfort flow trials due to poor cough reflex and inspiratory effort 2/2 deconditioning, however intermittent improvement through course. Rcx on 04/06 grew acinetobacter baumannii , pending drug sensitivities. ID following, per recommendation, discontinued meropenem without new regimen as patient clinically improving.      Interval History/Significant Events: AF HDS NAEON. Tolerated  BiPAP well. Better cough and vocalization. Improved appetite.    Review of Systems   Constitutional:  Positive for activity change and fatigue. Negative for chills, diaphoresis and fever.   HENT:  Positive for dental problem (all teeth removed previously d/t poor dentition). Negative for sneezing and sore throat.    Eyes:  Negative for pain and visual disturbance.   Respiratory:  Negative for cough, chest tightness, shortness of breath and wheezing.    Cardiovascular:  Negative for chest pain, palpitations and leg swelling.   Gastrointestinal:  Positive for abdominal distention. Negative for abdominal pain, blood in stool, constipation, diarrhea, nausea and vomiting.   Genitourinary:  Negative for dysuria, hematuria and urgency.   Musculoskeletal:  Negative for arthralgias and myalgias.   Skin:  Positive for color change (jaundice). Negative for rash.   Neurological:  Positive for weakness. Negative for tremors, syncope, light-headedness and numbness.   Psychiatric/Behavioral:  Negative for agitation and confusion.    Objective:     Vital Signs (Most Recent):  Temp: 97.6 °F (36.4 °C) (04/07/22 0301)  Pulse: 98 (04/07/22 0707)  Resp: 18 (04/07/22 0707)  BP: (!) 98/59 (04/07/22 0630)  SpO2: 100 % (04/07/22 0707)   Vital Signs (24h Range):  Temp:  [97.6 °F (36.4 °C)-99.4 °F (37.4 °C)] 97.6 °F (36.4 °C)  Pulse:  [] 98  Resp:  [15-60] 18  SpO2:  [89 %-100 %] 100 %  BP: ()/(51-82) 98/59   Weight: 22 kg (48 lb 8 oz)  Body mass index is 9.47 kg/m².      Intake/Output Summary (Last 24 hours) at 4/7/2022 0855  Last data filed at 4/6/2022 2100  Gross per 24 hour   Intake 849.36 ml   Output 400 ml   Net 449.36 ml       Physical Exam  Constitutional:       General: She is not in acute distress.     Appearance: She is ill-appearing. She is not toxic-appearing or diaphoretic.      Comments: Cachetic, temporal wasting, jaundiced, weak, but in no apparent distress   HENT:      Head: Normocephalic.      Comments: BiPAP,  NGT     Nose: Nose normal.   Eyes:      General: Scleral icterus present.      Extraocular Movements: Extraocular movements intact.      Pupils: Pupils are equal, round, and reactive to light.   Cardiovascular:      Rate and Rhythm: Regular rhythm. Tachycardia present.      Pulses: Normal pulses.      Heart sounds: Normal heart sounds. No murmur heard.  Pulmonary:      Effort: Pulmonary effort is normal. No respiratory distress.      Breath sounds: Normal breath sounds. No stridor. No wheezing or rales.      Comments: Poor cough reflex, transmitted upper airway noise  Abdominal:      General: Abdomen is flat. There is distension.      Palpations: Abdomen is soft.      Tenderness: There is no abdominal tenderness (improved). There is no guarding or rebound.   Musculoskeletal:         General: No swelling or tenderness. Normal range of motion.      Cervical back: Normal range of motion. No rigidity or tenderness.      Right lower leg: No edema.      Left lower leg: No edema.   Skin:     General: Skin is warm and dry.      Coloration: Skin is jaundiced.      Findings: Bruising and rash (petecchial rash on back) present.   Neurological:      General: No focal deficit present.      Mental Status: She is alert and oriented to person, place, and time. Mental status is at baseline.      Cranial Nerves: No cranial nerve deficit.      Sensory: No sensory deficit.      Motor: No weakness.   Psychiatric:         Mood and Affect: Mood normal.         Behavior: Behavior normal.         Thought Content: Thought content normal.       Vents:  Vent Mode: A/C (04/01/22 0705)  Ventilator Initiated: Yes (03/30/22 0141)  Set Rate: 16 BPM (04/01/22 0705)  Vt Set: 350 mL (04/01/22 0705)  Pressure Support: 0 cmH20 (04/01/22 0705)  PEEP/CPAP: 5 cmH20 (04/01/22 0705)  Oxygen Concentration (%): 25 (04/07/22 0356)  Peak Airway Pressure: 20 cmH2O (04/01/22 0705)  Plateau Pressure: 0 cmH20 (04/01/22 0705)  Total Ve: 9.02 mL (04/01/22  0705)  Negative Inspiratory Force (cm H2O): 0 (03/30/22 1552)  F/VT Ratio<105 (RSBI): (!) 65.45 (04/01/22 0705)  Lines/Drains/Airways       Peripherally Inserted Central Catheter Line  Duration             PICC Triple Lumen left basilic -- days                  Significant Labs:    CBC/Anemia Profile:  Recent Labs   Lab 04/06/22  0430 04/07/22  0334 04/07/22  0428   WBC 11.65 7.76 7.92   HGB 7.7* 6.9* 7.0*   HCT 25.3* 22.5* 23.2*    446 487*   * 107* 109*   RDW 20.0* 19.6* 19.1*        Chemistries:  Recent Labs   Lab 04/06/22  0430 04/06/22  1506 04/07/22  0334    134* 136   K 4.7 4.3 4.4   CL 96 91* 93*   CO2 34* 34* 32*   BUN 16 13 14   CREATININE 0.4* 0.4* 0.4*   CALCIUM 9.2 9.2 8.9   ALBUMIN 2.8* 2.8* 2.6*   PROT 6.2 6.2 5.7*   BILITOT 4.4* 4.3* 3.8*   ALKPHOS 1,071* 1,117* 1,023*   ALT 48* 57* 47*   AST 36 55* 44*   MG 1.8 1.7 1.8   PHOS 4.1 3.4 4.1       All pertinent labs within the past 24 hours have been reviewed.    Significant Imaging:  I have reviewed all pertinent imaging results/findings within the past 24 hours.      ABG  Recent Labs   Lab 04/04/22  1841   PH 7.412   PO2 29*   PCO2 61.5*   HCO3 39.2*   BE 15     Assessment/Plan:     Psychiatric  Polysubstance abuse  Opioid abuse  Tobacco abuse  Methamphetamine abuse    Noted to be on suboxone through suboxone clinic. Unclear historical path.    - Addiction Psych consult for suboxone; per their report, patient is not on suboxone regularly and not interested at this time  - Urine toxicology negative at this time  - HIV negative  - Hep panel negative    Pulmonary  Acute hypoxemic respiratory failure  Bilateral pleural effusions  Pleural consolidation, L  Atelectasis  Mucus Plugging    No baseline home O2 per patient. Presenting with 5 L NC O2. Unclear etiology, though previous documentation suggestive of pleural effusion vs component of PNA. Unclear etiology as patient seems to be volume down on exam. No reported history of heart  failure or COPD (though extensive tobacco use history). Etiology may be 2/2 to abdominal distention causing diaphragmatic pressure vs infectious etiology vs pleural effusion d/t third spacing vs infection? CT demonstrating bilateral pleural effusions and lingula consolidation; concerns for possible PNA. 03/29 patient decompensated while on 5L > 10 HFNC > intubation; CXR demonstrating L lung atelectasis / mucus plugging. 03/31 bronchoscopy for mucus evacuation; RCx sent, mild improvement on CXR following. Extubated to NC on 04/01 to BiPAP. Continue weaning supplemental O2 down; challenging given poor cough reflex and copious mucus production.    - Oxygen requirement appears largely 2/2 poor mechanical ventilation d/t deconditioning with component of mucus plugging d/t poor cough reflex  - On O2 currently, BiPAP qhs  - RCx with acinetobacter baumannii , ID following, susceptibility limited to TMP-SMX / Avycaz; meropenem off  - Fluconazole for OSH report of esophageal candidiasis   - Aggressive pulmonary hygiene, CPT, albuterol inhaler, nebulized saline, cough assist  - Repeat CXR demonstrates relative increased lung volumes, resolving atelectasis  - Continuous pulse ox  - Monitor fever curve; given lack of infectious presentation, will defer abx course now  - Lactate wnl  - Monitor BCx, RCx  - Aspiration precaution    Cardiac/Vascular  CHF (congestive heart failure)  Patient presenting with BNP 1200+, Repeat TTE 03/29, EF 35-40%, diastolic dysfunction. PAP 34 mmHg. No WMA per echo. Initial CXR demonstrated concerns for congestion vs effusion.    - Clinically euvolemic now  - Holding Lasix for now given euvolemic vs hypovolemic state; PRN Lasix to remain net even  - Strict I/Os  - GDMT at future time as tolerated     Paroxysmal tachycardia  Presenting with tachycardia to 120s. Noted in OSH previous progress note and was tempered with BB. Unclear etiology, potentially volume down driving sinus tachycardia as patient  has been on NGT suction d/t pSBO with limited PO intake. Also appears to be extremely malnourished. In setting of potential acute pulmonary process, may be related to acute infection vs mucus plugging / atelectasis    - Repeat EKG demonstrating sinus tachycardia and abnormal R wave progression; no previous EKG for comparison  - Cardiac telemetry  - Electrolyte trend, K > 4, Mg > 2  - Will hold trial of IVF bolus until further evaluation regarding volume status (BNP elevated 1000+ on admission)  - TTE revealing EF 35-40%, PAP 34,       ID  Esophageal candidiasis  - Continue fluconazole for total course 14-21 days    GI  Chronic biliary pancreatitis  Cholelithiasis    Patient family reports that she has had 3+ year history of abdominal pain secondary that they believe is due to her pancreas. Since then, she has had decreasing PO intake and loss of weight due to abdominal discomfort. Family states that patient did not regularly seek treatment for this issue, but used Tylenol for pain. Recently admitted in 03/2022 for gallstone pancreatitis at [facility], s/p lap cholecystectomy, c/b pSBO.    - See Failure to Thrive A/P    Generalized abdominal pain  H/o pSBO  H/o laparoscopic cholecystectomy  H/o chronic biliary pancreatitis   Dysphagia    Patient with recent h/o pSBO at OSH,with good response to NGT decompression. Advanced to CLD. Initially complaining of RUQ abdominal pain at OSH on initial presentation. Now with mild, diffuse abdominal pain on palpation, but none at rest. Abdomen remains distended. Hypoactive bowel sounds. Repeat KUB / CTAP demonstrating significant bowel dilatation aw/ air-fluid levels c/w SBO. NGT suction d/c on 04/01 after extubation    - Monitor BM, diet tolerance (currently passing BM and tolerating TF)  - NPO pending SLP (poor cough), ADAT following; TF per NGT in interim  - General surgery evaluated, no role for surgical intervention  - Bedside US abdomen without note for ascites or  other process  - Serial abdominal exams    Orthopedic  * Liver injury  Acute hepatic metabolic encephalopathy  Elevated transaminases  Elevated total bilirubin    35 y/o F with PMHx IVDU, polysubstance abuse presenting to [facility] as transfer from [facility] for hepatology evaluation. Initially presented with concerns for acute liver failure (encephalopathy, elevated elevated elevation of LFTs, jaundice) vs pSBO vs sepsis 2/2 abdominal infection. NGT decompression of pSBO with good response, advanced to CLD. Started on broad spectrum abx and tapered. Started on lactulose and rifaximin with good response of encephalopathy, now A&O x 4 without signs of asterixis, tongue fasciculations, hallucinations. Overall etiology may be 2/2 to extensive outpatient Tylenol use (states she used to take handfuls of Tylenol for her initial abdominal pain) vs other drug interactions vs cholestatic injury? Transferred to [facility] for further hepatological evaluation.    - LFTs remain elevated, but interval improvement; synthetic function intact  - Per hepatology recommendations, can defer MRCP and liver biopsy as LFTs appropriately improving  - Hepatology consulted and following; appreciate assistance  - Acute hepatitis panel negative  - HIV negative  - US liver doppler / abdomen did not demonstrate appreciable biliary duct dilatation  - Lactulose 20 BID (titrate to 4-5 BMs per day)  - Rifaximin  - Ceruloplasmin wnl, Copper low  - ELIN, Anti-Sm negative  - PT/INR daily  - Ferritin elevated  - Anti alpha 1 trypsin unremarkable  - Ascites studies unremarkable for SBP  - Trend CMP daily  - Trend CBC daily  - Monitor neurological status    Other  Failure to thrive in adult  Temporal wasting  Malnutrition    Setting of 3 year history of chronic pancreatitis without treatment and recent admission for gallstone pancreatitis s/p lap jim c/b pSBO, patient has had significantly decreased PO intake in conjunction with regular meth use.    -  Nutrition consulted, appreciate assistance  - High calorie/high protein diet as tolerated  - Boost TIDWM       Critical Care Daily Checklist:    A: Awake: RASS Goal/Actual Goal: RASS Goal: 0-->alert and calm  Actual: Wan Agitation Sedation Scale (RASS): Alert and calm   B: Spontaneous Breathing Trial Performed? Spon. Breathing Trial Initiated?: Initiated (04/01/22 1018)   C: SAT & SBT Coordinated?  N/A                      D: Delirium: CAM-ICU Overall CAM-ICU: Negative   E: Early Mobility Performed? Yes   F: Feeding Goal: Goals: Meet % EEN, EPN by RD f/u date  Status: Nutrition Goal Status: progressing towards goal   Current Diet Order   Procedures    Diet Dysphagia Pureed (IDDSI Level 4) [facility]; High Protein/High Calorie     Order Specific Question:   Indicate patient location for additional diet options:     Answer:   [facility]     Order Specific Question:   Additional Diet Options:     Answer:   High Protein/High Calorie      AS: Analgesia/Sedation N/A   T: Thromboembolic Prophylaxis Heparin   H: HOB > 300 Yes   U: Stress Ulcer Prophylaxis (if needed) Protonix   G: Glucose Control SSI   B: Bowel Function Stool Occurrence: 1   I: Indwelling Catheter (Lines & Perez) Necessity PICC   D: De-escalation of Antimicrobials/Pharmacotherapies Fluconazole, pending RCx susceptibility and ID weigh in    Plan for the day/ETD Stepdown to HM; continue Abx, physical conditioning    Code Status:  Family/Goals of Care: Full Code         Critical secondary to Patient has a condition that poses threat to life and bodily function: Severe Respiratory Distress      Critical care was time spent personally by me on the following activities: development of treatment plan with patient or surrogate and bedside caregivers, discussions with consultants, evaluation of patient's response to treatment, examination of patient, ordering and performing treatments and interventions, ordering and review of laboratory  studies, ordering and review of radiographic studies, pulse oximetry, re-evaluation of patient's condition. This critical care time did not overlap with that of any other provider or involve time for any procedures.     Mike-Joseph Gloria MD  Critical Care Medicine  [facility] - Cardiac Medical ICU

## 2022-04-07 NOTE — PT/OT/SLP PROGRESS
Occupational Therapy   Treatment       Patient Name:  Carolina Baldwin   MRN:  [id number]  Admit Date: 3/28/2022  Admitting Diagnosis:  Liver injury   Length of Stay: 10 days  Recent Surgery: * No surgery found *      Recommendations:     Discharge Recommendations: home health PT, home health OT  Discharge Equipment Recommendations:  3-in-1 commode, shower chair, walker, rolling  Barriers to discharge:  Inaccessible home environment, Decreased caregiver support, Other (Comment) (impaired activity tolerance)    Plan:     Patient to be seen 3 x/week to address the above listed problems via self-care/home management, therapeutic activities, therapeutic exercises  · Plan of Care Expires: 04/28/22  · Plan of Care Reviewed with: patient    Assessment:   Carolina Baldwin is a 36 y.o. female with a medical diagnosis of Liver injury.  She presents with the following performance deficits affecting function: weakness, impaired endurance, impaired self care skills, impaired functional mobilty, gait instability, impaired balance, decreased coordination, decreased upper extremity function, decreased lower extremity function, decreased safety awareness, pain, impaired cardiopulmonary response to activity.  Pt continues to benefit from a collaborative PT/OT/SLP program to improve quality of life and focus on recovery of impairments.     Pt progressing well towards goals. Pt able to ambulate a short distance in the room with one person Min assistance before requiring seated rest break at sink level. Pt able to split ADLs into standing and sitting activities demonstrating good application of energy conservation techniques and improved safety insight.     Rehab Prognosis: Good; patient would benefit from acute skilled OT services to address these deficits and reach maximum level of function.        Subjective   Communicated with: RN prior to session.  Patient found HOB elevated with blood pressure cuff, oxygen, peripheral IV, pulse ox  "(continuous), telemetry upon OT entry to room.    Patient: "I don't feel well today, i'm just so sore"     Pain/Comfort:  · Pain Rating 1: 4/10  · Location - Side 1: Bilateral  · Location - Orientation 1: generalized  · Location 1: leg ("sore" pain to B legs)  · Pain Addressed 1: Reposition, Distraction  · Pain Rating Post-Intervention 1: 4/10    Objective:   Patient found with: blood pressure cuff, oxygen, peripheral IV, pulse ox (continuous), telemetry     General Precautions: Standard, Cardiac aspiration, fall   Orthopedic Precautions:N/A   Braces: N/A   Respiratory Status:    Nasal cannula, flow 3 L/min  Vitals: BP (!) 98/59   Pulse 96   Temp 97.6 °F (36.4 °C) (Axillary)   Resp 20   Ht 5' (1.524 m)   Wt 22 kg (48 lb 8 oz)   SpO2 100%   BMI 9.47 kg/m²     Outcome Measures:  [facility] 6 Click ADL: 18    Cognition:   · Oriented X 4 and Alert  · Command following: follows two-step commands, limited by fatigue w exertion  · Communication: clear/fluent    Occupational Performance:  Bed Mobility:    · Patient completed Rolling/Turning to Right with contact guard assistance and with side rail  · Patient completed Supine to Sit with minimum assistance on R side of bed  · Scooting anteriorly to EOB to have both feet planted on floor: contact guard assistance and with side rail    Functional Mobility/Transfers:   Static Sitting EOB: SBA   Dynamic Sitting EOB: SBA   Patient completed Sit <> Stand Transfer with contact guard assistance  with  hand-held assist   - Note: pt Nato from bedside commode at sink level   Static Standing Balance: CGA-Nato w HH assist or bracing on sink  - impaired standing activity tolerance- bedside commode placed at sink for seated rest breaks   Dynamic Standing Balance: Min A wHH assist   Patient completed Bed <> Chair Transfer using Step Transfer technique with minimum assistance with hand-held assist   Patient completed Toilet Transfer Step Transfer technique with minimum assistance " with  hand-held assist and bedside commode   Functional mobility: Bathroom distance with Min A w HH assist. Pt required extended rest break in seated at sink level to complete grooming seated.      Activities of Daily Living:  · Feeding:  modified independence set up seated   · Grooming: contact guard assistance at sink level; initially performed in standing for aprx 1 min, then 2* fatigue, pt required seated rest for completion of task on bedside commode at sink level. Pt washed hands standing, brushed teeth, washed face in seated.   · Toileting: contact guard assistance seated on commode performing anterior daniel hygeine.     AMPAC 6 Click ADL:  AMPAC Total Score: 18    Treatment & Education:  -OT POC, safety during ADLs and mobility   -Education on energy conservation and task modification to maximize safety and independence  -Questions answered within OT scope of practice.      Patient left up in chair with all lines intact, call button in reach, RN notified and seat built up for improved comfort and skin protection    GOALS:   Multidisciplinary Problems     Occupational Therapy Goals     Not on file                Time Tracking:     OT Date of Treatment: 04/07/22  OT Start Time: 1100  OT Stop Time: 1139  OT Total Time (min): 39 min  Additional staff present: N/A      Billable Minutes:Self Care/Home Management 30  Therapeutic Activity 9      4/7/2022

## 2022-04-07 NOTE — ASSESSMENT & PLAN NOTE
Bilateral pleural effusions  Pleural consolidation, L  Atelectasis  Mucus Plugging    No baseline home O2 per patient. Presenting with 5 L NC O2. Unclear etiology, though previous documentation suggestive of pleural effusion vs component of PNA. Unclear etiology as patient seems to be volume down on exam. No reported history of heart failure or COPD (though extensive tobacco use history). Etiology may be 2/2 to abdominal distention causing diaphragmatic pressure vs infectious etiology vs pleural effusion d/t third spacing vs infection? CT demonstrating bilateral pleural effusions and lingula consolidation; concerns for possible PNA. 03/29 patient decompensated while on 5L > 10 HFNC > intubation; CXR demonstrating L lung atelectasis / mucus plugging. 03/31 bronchoscopy for mucus evacuation; RCx sent, mild improvement on CXR following. Extubated to NC on 04/01 to BiPAP. Continue weaning supplemental O2 down; challenging given poor cough reflex and copious mucus production.    - Oxygen requirement appears largely 2/2 poor mechanical ventilation d/t deconditioning with component of mucus plugging d/t poor cough reflex  - On O2 currently, BiPAP qhs  - RCx with acinetobacter baumannii , ID following, susceptibility limited to TMP-SMX / Avycaz; meropenem off  - Fluconazole for OSH report of esophageal candidiasis   - Aggressive pulmonary hygiene, CPT, albuterol inhaler, nebulized saline, cough assist  - Repeat CXR demonstrates relative increased lung volumes, resolving atelectasis  - Continuous pulse ox  - Monitor fever curve; given lack of infectious presentation, will defer abx course now  - Lactate wnl  - Monitor BCx, RCx  - Aspiration precaution

## 2022-04-07 NOTE — ASSESSMENT & PLAN NOTE
Patient presenting with BNP 1200+, Repeat TTE 03/29, EF 35-40%, diastolic dysfunction. PAP 34 mmHg. No WMA per echo. Initial CXR demonstrated concerns for congestion vs effusion.    - Clinically euvolemic now  - Holding Lasix for now given euvolemic vs hypovolemic state; PRN Lasix to remain net even  - Strict I/Os  - GDMT at future time as tolerated

## 2022-04-07 NOTE — RESIDENT HANDOFF
Critical Care Handoff     Primary Team: [facility] CRITICAL CARE MEDICINE Room Number: 6070/6070 A     Patient Name: Carolina Baldwin MRN: [id number]     Date of Birth: [id number] Allergies: Penicillins     Age: 36 y.o. Admit Date: 3/28/2022     Sex: female  BMI: Body mass index is 9.47 kg/m².     Code Status: Full Code        llness Level (current clinical status): Watcher - No    Reason for Admission: Liver injury    Brief HPI (pertinent PMH and diagnosis or differential diagnosis): 35 y/o F with PMHx IVDU on suboxone, polysubstance use (meth, opioids, tobacco), gastric ulcers initially presented to OSH for increasing jaundice, lethargy, AMS, evaluation for pSBO, increased O2 requirement (3-4 L, no baseline home O2). Was started on broad spectrum Abx at OSH and eventually discontinued. Initially presented oriented x 2 and severe jaundice, elevated ammonia, but normal LFTs? Started on lactulose and rifaximin. NGT decompression at OSH for SBO with good response, advanced to CLD. Transferred to [facility] for hepatology evaluation.     On presentation to [facility] MICU, patient is overtly jaundiced but with intact mentation (a&o x4). Complains of lower back pain that she experienced from the long transport from OSH to [facility]. Also complains of increasing hunger and thirst (good appetite). Questionable historian, but understands the circumstance, stating she knows she is having new liver problems and is here for the hepatology team to evaluate her. Patient reports having good bowel movements and appropriate urinary voids. Specifically denies chest pain, increased work of breathing, increasing/worsening abdominal pain, fever/chills, nausea, vomiting, constipation, diarrhea, dysuria, suprapubic pain, recent IVDU (currently on suboxone), hallucinations (auditory and visual), tremors, seizures, loss of consciousness, headache, sensory/strength changes, abnormal bleeding.    Patient is from [place], [place]. Lives with boyfriend and 5 children. Of note, she  calls her boyfriend her  but is not legally ; her closest relative is her sister. Substantial IVDU history (meth), non-injectable opioid abuse. Recently stopped smoking history (smoked since 10 y/o). On suboxone per OSH report and per patient. Does not know if any active HIV, hepatitis infection. Family history of CAD, ACS, and cancer (unspecified).        Procedure Date: Bronchoscopy     Hospital Course (updated, brief assessment by system or problem, significant events): Transferred from OSH to [facility] MICU for HLOC, hepatology evaluation, and SBO. On arrival, patient A&O x 4 with diffuse abdominal pain w/o rebound, on 5 L NC. CTAP demonstrated diffuse bowel air levels and dilatation c/w SBO. Bilateral lung effusions present with concerns for L lingula consolidation. AF VS HDS on presentation to [facility]. Bedside US without amenable ascites pocket for paracentesis. Gen Surg evaluated w/o need for surgical intervention. Started on CAP coverage for pulmonary findings. Lactulose titrated to 4-5 BM/day, rifaximin. Hepatology consulted and work up initiated. Patient desaturated on 5 L NC and subsequently stepped up to 10 L HFNC with eventual intubation. Found to be mucus plugging per CXR (L lung atelectasis with L midline shift). Levophed started during intubation, weaned off. Patient respiratory status improving. Bronch completed for mucus evacuation on 03/31. Extubated to NC with BiPAP on 04/01. Suction turned off to NGT per gen surg recs. Upgraded Cefepime coverage to Meropenem due to GNR+ Rcx while patient was intubated. Per OSH reports, there were previously concerns for esophageal candidiasis, as such, added Fluconazole. Increased airway mucus production, poor cough reflex. SLP swallow eval; diet advanced, NGT removed. Difficulty tolerating comfort flow trials due to poor cough reflex and inspiratory effort 2/2 deconditioning, however intermittent improvement through course; now room air > 95% but prefers to  wear NC. Rcx on 04/06 grew acinetobacter baumannii , pending drug sensitivities. ID following, per recommendation, discontinued meropenem without new regimen as patient clinically improving.       Tasks (specific, using if-then statements):   - Optimize nutrition status  - Wean off O2; room air 95-10% on RA but pt requests   - PT/OT  - Addiction psych consult; ? Resume Suboxone per pt request  - MRCP at later date  - Ok for med surg with telemetry    Contingency Plan (special circumstances anticipated and plan): N/A    Estimated Discharge Date: 4/8/2022    Horacio Gloria MD  Internal Medicine PGY-1  [facility]

## 2022-04-07 NOTE — SUBJECTIVE & OBJECTIVE
Interval History/Significant Events: AF HDS NAEON. Tolerated BiPAP well. Better cough and vocalization. Improved appetite.    Review of Systems   Constitutional:  Positive for activity change and fatigue. Negative for chills, diaphoresis and fever.   HENT:  Positive for dental problem (all teeth removed previously d/t poor dentition). Negative for sneezing and sore throat.    Eyes:  Negative for pain and visual disturbance.   Respiratory:  Negative for cough, chest tightness, shortness of breath and wheezing.    Cardiovascular:  Negative for chest pain, palpitations and leg swelling.   Gastrointestinal:  Positive for abdominal distention. Negative for abdominal pain, blood in stool, constipation, diarrhea, nausea and vomiting.   Genitourinary:  Negative for dysuria, hematuria and urgency.   Musculoskeletal:  Negative for arthralgias and myalgias.   Skin:  Positive for color change (jaundice). Negative for rash.   Neurological:  Positive for weakness. Negative for tremors, syncope, light-headedness and numbness.   Psychiatric/Behavioral:  Negative for agitation and confusion.    Objective:     Vital Signs (Most Recent):  Temp: 97.6 °F (36.4 °C) (04/07/22 0301)  Pulse: 98 (04/07/22 0707)  Resp: 18 (04/07/22 0707)  BP: (!) 98/59 (04/07/22 0630)  SpO2: 100 % (04/07/22 0707)   Vital Signs (24h Range):  Temp:  [97.6 °F (36.4 °C)-99.4 °F (37.4 °C)] 97.6 °F (36.4 °C)  Pulse:  [] 98  Resp:  [15-60] 18  SpO2:  [89 %-100 %] 100 %  BP: ()/(51-82) 98/59   Weight: 22 kg (48 lb 8 oz)  Body mass index is 9.47 kg/m².      Intake/Output Summary (Last 24 hours) at 4/7/2022 0855  Last data filed at 4/6/2022 2100  Gross per 24 hour   Intake 849.36 ml   Output 400 ml   Net 449.36 ml       Physical Exam  Constitutional:       General: She is not in acute distress.     Appearance: She is ill-appearing. She is not toxic-appearing or diaphoretic.      Comments: Cachetic, temporal wasting, jaundiced, weak, but in no apparent  distress   HENT:      Head: Normocephalic.      Comments: BiPAP, NGT     Nose: Nose normal.   Eyes:      General: Scleral icterus present.      Extraocular Movements: Extraocular movements intact.      Pupils: Pupils are equal, round, and reactive to light.   Cardiovascular:      Rate and Rhythm: Regular rhythm. Tachycardia present.      Pulses: Normal pulses.      Heart sounds: Normal heart sounds. No murmur heard.  Pulmonary:      Effort: Pulmonary effort is normal. No respiratory distress.      Breath sounds: Normal breath sounds. No stridor. No wheezing or rales.      Comments: Poor cough reflex, transmitted upper airway noise  Abdominal:      General: Abdomen is flat. There is distension.      Palpations: Abdomen is soft.      Tenderness: There is no abdominal tenderness (improved). There is no guarding or rebound.   Musculoskeletal:         General: No swelling or tenderness. Normal range of motion.      Cervical back: Normal range of motion. No rigidity or tenderness.      Right lower leg: No edema.      Left lower leg: No edema.   Skin:     General: Skin is warm and dry.      Coloration: Skin is jaundiced.      Findings: Bruising and rash (petecchial rash on back) present.   Neurological:      General: No focal deficit present.      Mental Status: She is alert and oriented to person, place, and time. Mental status is at baseline.      Cranial Nerves: No cranial nerve deficit.      Sensory: No sensory deficit.      Motor: No weakness.   Psychiatric:         Mood and Affect: Mood normal.         Behavior: Behavior normal.         Thought Content: Thought content normal.       Vents:  Vent Mode: A/C (04/01/22 0705)  Ventilator Initiated: Yes (03/30/22 0141)  Set Rate: 16 BPM (04/01/22 0705)  Vt Set: 350 mL (04/01/22 0705)  Pressure Support: 0 cmH20 (04/01/22 0705)  PEEP/CPAP: 5 cmH20 (04/01/22 0705)  Oxygen Concentration (%): 25 (04/07/22 0356)  Peak Airway Pressure: 20 cmH2O (04/01/22 0705)  Plateau Pressure:  0 cmH20 (04/01/22 0705)  Total Ve: 9.02 mL (04/01/22 0705)  Negative Inspiratory Force (cm H2O): 0 (03/30/22 1552)  F/VT Ratio<105 (RSBI): (!) 65.45 (04/01/22 0705)  Lines/Drains/Airways       Peripherally Inserted Central Catheter Line  Duration             PICC Triple Lumen left basilic -- days                  Significant Labs:    CBC/Anemia Profile:  Recent Labs   Lab 04/06/22  0430 04/07/22  0334 04/07/22  0428   WBC 11.65 7.76 7.92   HGB 7.7* 6.9* 7.0*   HCT 25.3* 22.5* 23.2*    446 487*   * 107* 109*   RDW 20.0* 19.6* 19.1*        Chemistries:  Recent Labs   Lab 04/06/22  0430 04/06/22  1506 04/07/22  0334    134* 136   K 4.7 4.3 4.4   CL 96 91* 93*   CO2 34* 34* 32*   BUN 16 13 14   CREATININE 0.4* 0.4* 0.4*   CALCIUM 9.2 9.2 8.9   ALBUMIN 2.8* 2.8* 2.6*   PROT 6.2 6.2 5.7*   BILITOT 4.4* 4.3* 3.8*   ALKPHOS 1,071* 1,117* 1,023*   ALT 48* 57* 47*   AST 36 55* 44*   MG 1.8 1.7 1.8   PHOS 4.1 3.4 4.1       All pertinent labs within the past 24 hours have been reviewed.    Significant Imaging:  I have reviewed all pertinent imaging results/findings within the past 24 hours.

## 2022-04-07 NOTE — ASSESSMENT & PLAN NOTE
Cholelithiasis    Patient family reports that she has had 3+ year history of abdominal pain secondary that they believe is due to her pancreas. Since then, she has had decreasing PO intake and loss of weight due to abdominal discomfort. Family states that patient did not regularly seek treatment for this issue, but used Tylenol for pain. Recently admitted in 03/2022 for gallstone pancreatitis at [facility], s/p lap cholecystectomy, c/b pSBO.    - See Failure to Thrive A/P

## 2022-04-07 NOTE — PROGRESS NOTES
[facility]  Gastroenterology  Progress Note    Patient Name: Carolina Baldwin  MRN: [id number]  Admission Date: 3/28/2022  Hospital Length of Stay: 10 days    Subjective:     HPI:   Ms Baldwin is a 35yo PMHx s/p cholecystectomy, polysubstance use d/o (meth, opiates, tobax) initially presents as transfer from OSH on 03/28 for further evaluation of acute liver injury.      Initially presented to OSH for increasing jaundice, lethargy, AMS found to have pSBO w/ increased o2 requirements. NGT placed for SBO and initially received BSABx which were discontinued. Had elevated ammonia and was receiving lactulose and xifaxan.       Since arrival VS notable for AF, HR 110s, normotensive, RR 30 on 1L NC satting 95%. CBC w/o leukocytosis, Hgb 8.2, plts 135. BMP w/ BUN/ Cr 11/ 0.4. LFTs w/ AST/ / 79, , T bili 9.3. INR 1.4. BNP 1200. CTAP w/ contrast notable w/ SBO, large volume complex abdominopelvic fluid, b/l pleural effusions, hepatic steatosis. US liver w/ doppler w/ hepatic steatosis.     Hepatitis panel, ELIN, ASMA, ceruloplasmin pending.     Primary team is diuresing w/ lasix and surgery is following for SBO. Receiving lactulose and xifaxan as well.     Interval History:  Liver enzymes continue to downtrend. Suspect     No current facility-administered medications on file prior to encounter.     Current Outpatient Medications on File Prior to Encounter   Medication Sig Dispense Refill    buprenorphine-naloxone 8-2 (SUBOXONE) 8-2 mg Subl Place 2.5 tablets under the tongue once daily.      nicotine (NICODERM CQ) 21 mg/24 hr Place 1 patch onto the skin once daily.         Review of patient's allergies indicates:   Allergen Reactions    Penicillins Shortness Of Breath and Swelling     Tolerated cefepime 3/2022         Objective:     Vitals:    04/07/22 0707   BP:    Pulse: 98   Resp: 18   Temp:        Significant Labs:  Recent Labs   Lab 04/06/22  0430 04/07/22  0334 04/07/22  0428   HGB 7.7*  6.9* 7.0*       Lab Results   Component Value Date    WBC 7.92 04/07/2022    HGB 7.0 (L) 04/07/2022    HCT 23.2 (L) 04/07/2022     (H) 04/07/2022     (H) 04/07/2022       Lab Results   Component Value Date     04/07/2022    K 4.4 04/07/2022    CL 93 (L) 04/07/2022    CO2 32 (H) 04/07/2022    BUN 14 04/07/2022    CREATININE 0.4 (L) 04/07/2022    CALCIUM 8.9 04/07/2022    ANIONGAP 11 04/07/2022    ESTGFRAFRICA >60.0 04/07/2022    EGFRNONAA >60.0 04/07/2022       Lab Results   Component Value Date    ALT 47 (H) 04/07/2022    AST 44 (H) 04/07/2022     (H) 03/29/2022    ALKPHOS 1,023 (H) 04/07/2022    BILITOT 3.8 (H) 04/07/2022       Lab Results   Component Value Date    INR 1.0 04/07/2022    INR 1.0 04/06/2022    INR 1.1 04/05/2022       Significant Imaging:  Reviewed pertinent radiology findings.       Assessment/Plan:     37yo PMHx s/p cholecystectomy, polysubstance use d/o (meth, opiates, tobax) initially presents as transfer from OSH on 03/28 for further evaluation of acute liver injury.      W/u thus far notable for LFTs w/ AST/ / 79, , T bili 9.3. INR 1.4. BNP 1200. CTAP w/ contrast notable w/ SBO, large volume complex abdominopelvic fluid, b/l pleural effusions, hepatic steatosis. US liver w/ doppler w/ hepatic steatosis.     Primary team is diuresing w/ lasix and surgery is following for SBO. Receiving lactulose and xifaxan as well.      Presentation concerning for cholestatic liver injury, however, no ksenia obstruction on imaging. W/u thus far w/ negative A1At, PETH, iron panel, ELIN, ASMA, AMA, ceroluplasmin, HIV, hepatitis panel. Ferritin elevated 2100. TTE 35-40%.    Suspect shock liver compounded w/ decreased rejection fraction and liver enzymes appropriately downtrending.     Problem List:  1. Acute cholestatic liver injury     Recommendations:  1. Will defer MRCP/ liver biopsy at this time  2. Daily CMP and INR      Thank you for involving us in the care of Carolina  Andie Baldwin. Please call with any additional questions, concerns or changes in the patient's clinical status. We will continue to follow.     John Butler MD  Gastroenterology Fellow PGY IV   [facility]

## 2022-04-07 NOTE — PROVIDER TRANSFER
Hospital Medicine  Stepdown Acceptance Note      Patient Name: Carolina Baldwin  MRN:  [id number]  Primary Team: [facility]IT[facility] CARE MEDICINE Macey Harrison MD  Date of Admission:  3/28/2022     Length of Stay:  LOS: 10 days   Principal Problem:  Liver injury      HPI:       35 y/o F with PMHx IVDU on suboxone, polysubstance use (meth, opioids, tobacco), gastric ulcers initially presented to OSH for increasing jaundice, lethargy, AMS, evaluation for pSBO, increased O2 requirement (3-4 L, no baseline home O2). Was started on broad spectrum Abx at OSH and eventually discontinued. Initially presented oriented x 2 and severe jaundice, elevated ammonia, but normal LFTs? Started on lactulose and rifaximin. NGT decompression at OSH for SBO with good response, advanced to CLD. Transferred to [facility] for hepatology evaluation.      On presentation to [facility] MICU, patient is overtly jaundiced but with intact mentation (a&o x4). Complains of lower back pain that she experienced from the long transport from OSH to [facility]. Also complains of increasing hunger and thirst (good appetite). Questionable historian, but understands the circumstance, stating she knows she is having new liver problems and is here for the hepatology team to evaluate her. Patient reports having good bowel movements and appropriate urinary voids. Specifically denies chest pain, increased work of breathing, increasing/worsening abdominal pain, fever/chills, nausea, vomiting, constipation, diarrhea, dysuria, suprapubic pain, recent IVDU (currently on suboxone), hallucinations (auditory and visual), tremors, seizures, loss of consciousness, headache, sensory/strength changes, abnormal bleeding.     Patient is from [place], [place]. Lives with boyfriend and 5 children. Of note, she calls her boyfriend her  but is not legally ; her closest relative is her sister. Substantial IVDU history (meth), non-injectable opioid abuse. Recently stopped smoking history (smoked  since 10 y/o). On suboxone per OSH report and per patient. Does not know if any active HIV, hepatitis infection. Family history of CAD, ACS, and cancer (unspecified).         Hospital Course:       Ms. Carolina Baldwin is a 36 y.o. female who was admitted to [facility] on 3/28/2022 for Hepatology evaluation of liver failure and an SBO.  CTAP demonstrated diffuse bowel air levels and dilatation consistent with an SBO, as well as bilateral lung effusions present with concerns for a left lingula consolidation.  Gen Surg was consulted, who suggested supportive care with NGT to suction.  She was started on CAP coverage for pneumonia.  Hepatology was consulted and she was started on Lactulose and Rifaximin.   On 3/31, she desaturated on 5L NC and subsequently stepped up to 10L HFNC with eventual intubation and was started on Levophed and Cefepime.  She was found to have mucus plugging per CXR (left lung atelectasis with left midline shift).   Bronch was completed for mucus evacuation on 3/31.  She was extubated to NC with BiPAP on 4/01.  Her respiratory culture from 3/31 returned with  Acinetobacter and ID was consulted.  She was changed to Meropenem which was stopped by ID as she was clinically improving, and the sample was not collected during bronchoscopy procedure after mucus plugging.  She was started on Fluconazole for esophageal candidiasis with plans for 14-21 days of treatment (start date 4/4).  She was SD to  on 4/7.        Disposition:   PT/OT  Discharge Needs:  TBD  KEN:  4/8/2022  Code Status:    Code Status: Full Code      Patient has been accepted by [facility] I[facility], who will assume care of the patient upon arrival to the floor.  Please contact [facility] [phone] with any concerns prior to arrival.      Macey Harrison MD  [facility]

## 2022-04-07 NOTE — NURSING
Latest Reference Range & Units 04/07/22 03:34 04/07/22 04:28   WBC 3.90 - 12.70 K/uL 7.76 7.92   RBC 4.00 - 5.40 M/uL 2.10 (L) 2.13 (L)   Hemoglobin 12.0 - 16.0 g/dL 6.9 (L) 7.0 (L)   Hematocrit 37.0 - 48.5 % 22.5 (L) 23.2 (L)   MCV 82 - 98 fL 107 (H) 109 (H)   MCH 27.0 - 31.0 pg 32.9 (H) 32.9 (H)   MCHC 32.0 - 36.0 g/dL 30.7 (L) 30.2 (L)   RDW 11.5 - 14.5 % 19.6 (H) 19.1 (H)   Platelets 150 - 450 K/uL 446 487 (H)     CBC this AM showed H/H 7.7/25.3 >> 6.9/22.5. Pt remains asymptomatic and hemodynamically stable, VSS with MAPs sustaining > 65 without vasopressor support. Pt NSR-low ST on the monitor. No evident source of a bleed noted. CBC repeated. H/H 7.0/23.2. Will ROSANNA Pearce notified. No new orders at this time.

## 2022-04-07 NOTE — PLAN OF CARE
CMICU DAILY GOALS     VSS on 3L NC, BiPAP HS, satting > 92% per O2 goal on 25% FiO2, 10/5. Pt afebrile during shift. MAPs maintained > 65 without vasopressor support. NSR-ST on monitor with no ectopy noted. Turned Q2. Bony prominences padded. Bed alarm set, SR x3. Assessment per flow sheet, patient progressing towards goals as tolerated, plan of care reviewed with patient, all concerns addressed.    Sebastian Brannon    A: Awake    RASS: Goal - RASS Goal: 0-->alert and calm  Actual - RASS (Wan Agitation-Sedation Scale): 0-->alert and calm   Restraint necessity: Clinical Justification: Treatment Interference  B: Breathe   SBT: Not intubated   C: Coordinate A & B, analgesics/sedatives   Pain: managed    SAT: Not intubated  D: Delirium   CAM-ICU: Overall CAM-ICU: Negative  E: Early(intubated/ Progressive (non-intubated) Mobility   MOVE Screen: Pass   Activity: Activity Management: Rolling - L1  FAS: Feeding/Nutrition   Diet order: Diet/Nutrition Received: full liquid, Specialty Diet/Nutrition Received: other (see comments) (Isosource 1.5 continuos via NGT)  T: Thrombus   DVT prophylaxis: VTE Required Core Measure: Pharmacological prophylaxis initiated/maintained  H: HOB Elevation   Head of Bed (HOB) Positioning: HOB at 30-45 degrees  U: Ulcer Prophylaxis   GI: yes  G: Glucose control   managed    S: Skin   Bathing/Skin Care: (S) bath, partial, dressed/undressed, electrode patches/site rotation, incontinence care, linen changed  Device Skin Pressure Protection: absorbent pad utilized/changed, adhesive use limited, positioning supports utilized, pressure points protected, skin-to-device areas padded, skin-to-skin areas padded  Pressure Reduction Devices: foam padding utilized, heel offloading device utilized, positioning supports utilized, pressure-redistributing mattress utilized, specialty bed utilized  Pressure Reduction Techniques: frequent weight shift encouraged, pressure points protected, weight shift assistance  provided  Skin Protection: adhesive use limited, incontinence pads utilized, protective footwear used, silicone foam dressing in place, skin-to-device areas padded, skin-to-skin areas padded, transparent dressing maintained, tubing/devices free from skin contact  B: Bowel Function   diarrhea   I: Indwelling Catheters   Perez necessity: [REMOVED]      Urethral Catheter 03/28/22 6377 Latex-Reason for Continuing Urinary Catheterization: Critically ill in ICU and requiring hourly monitoring of intake/output   CVC necessity: Yes  D: De-escalation Antibiotics   Yes    Family/Goals of care/Code Status   Code Status: Full Code    24H Vital Sign Range  Temp:  [98 °F (36.7 °C)-99.4 °F (37.4 °C)]   Pulse:  []   Resp:  [14-60]   BP: ()/(51-82)   SpO2:  [89 %-100 %]      Shift Events   No acute events throughout shift

## 2022-04-07 NOTE — ASSESSMENT & PLAN NOTE
Acute hepatic metabolic encephalopathy  Elevated transaminases  Elevated total bilirubin    35 y/o F with PMHx IVDU, polysubstance abuse presenting to [facility] as transfer from [facility] for hepatology evaluation. Initially presented with concerns for acute liver failure (encephalopathy, elevated elevated elevation of LFTs, jaundice) vs pSBO vs sepsis 2/2 abdominal infection. NGT decompression of pSBO with good response, advanced to [facility]. Started on broad spectrum abx and tapered. Started on lactulose and rifaximin with good response of encephalopathy, now A&O x 4 without signs of asterixis, tongue fasciculations, hallucinations. Overall etiology may be 2/2 to extensive outpatient Tylenol use (states she used to take handfuls of Tylenol for her initial abdominal pain) vs other drug interactions vs cholestatic injury? Transferred to [facility] for further hepatological evaluation.    - LFTs remain elevated, but interval improvement; synthetic function intact  - Per hepatology recommendations, can defer MRCP and liver biopsy as LFTs appropriately improving  - Hepatology consulted and following; appreciate assistance  - Acute hepatitis panel negative  - HIV negative  - US liver doppler / abdomen did not demonstrate appreciable biliary duct dilatation  - Lactulose 20 BID (titrate to 4-5 BMs per day)  - Rifaximin  - Ceruloplasmin wnl, Copper low  - ELIN, Anti-Sm negative  - PT/INR daily  - Ferritin elevated  - Anti alpha 1 trypsin unremarkable  - Ascites studies unremarkable for SBP  - Trend CMP daily  - Trend CBC daily  - Monitor neurological status

## 2022-04-07 NOTE — ASSESSMENT & PLAN NOTE
Temporal wasting  Malnutrition    Setting of 3 year history of chronic pancreatitis without treatment and recent admission for gallstone pancreatitis s/p lap jim c/b pSBO, patient has had significantly decreased PO intake in conjunction with regular meth use.    - Nutrition consulted, appreciate assistance  - High calorie/high protein diet as tolerated  - Boost TIDWM

## 2022-04-07 NOTE — PLAN OF CARE
Infectious Disease Note      Chart has been reviewed.Patient remains afebrile and without leukocytosis. Oxygen requirements stable. Suspect  cultured in sputum is a colonizer Monitor off antibiotics. If she develops increasing oxygen requirements and concern for pneumonia then would start Bactrim and reconsult ID. Complete a 14-21 day course of fluconazole for yeast esophagitis diagnosed at transferring hospital.     Infectious Diseases will sign off. Please call if questions arise.   Marina Patel MD  Infectious Diseases

## 2022-04-08 PROBLEM — D53.9 MACROCYTIC ANEMIA: Status: ACTIVE | Noted: 2022-04-07

## 2022-04-08 LAB
ALBUMIN SERPL BCP-MCNC: 2.6 G/DL (ref 3.5–5.2)
ALP SERPL-CCNC: 1047 U/L (ref 55–135)
ALT SERPL W/O P-5'-P-CCNC: 42 U/L (ref 10–44)
ANION GAP SERPL CALC-SCNC: 9 MMOL/L (ref 8–16)
ANISOCYTOSIS BLD QL SMEAR: SLIGHT
AST SERPL-CCNC: 44 U/L (ref 10–40)
BASOPHILS # BLD AUTO: 0.03 K/UL (ref 0–0.2)
BASOPHILS # BLD AUTO: 0.04 K/UL (ref 0–0.2)
BASOPHILS NFR BLD: 0.4 % (ref 0–1.9)
BASOPHILS NFR BLD: 0.6 % (ref 0–1.9)
BILIRUB SERPL-MCNC: 3.9 MG/DL (ref 0.1–1)
BLD PROD TYP BPU: NORMAL
BLOOD UNIT EXPIRATION DATE: NORMAL
BLOOD UNIT TYPE CODE: 5100
BLOOD UNIT TYPE: NORMAL
BUN SERPL-MCNC: 14 MG/DL (ref 6–20)
CALCIUM SERPL-MCNC: 8.9 MG/DL (ref 8.7–10.5)
CHLORIDE SERPL-SCNC: 98 MMOL/L (ref 95–110)
CO2 SERPL-SCNC: 32 MMOL/L (ref 23–29)
CODING SYSTEM: NORMAL
CREAT SERPL-MCNC: 0.4 MG/DL (ref 0.5–1.4)
DIFFERENTIAL METHOD: ABNORMAL
DIFFERENTIAL METHOD: ABNORMAL
DISPENSE STATUS: NORMAL
EOSINOPHIL # BLD AUTO: 0 K/UL (ref 0–0.5)
EOSINOPHIL # BLD AUTO: 0 K/UL (ref 0–0.5)
EOSINOPHIL NFR BLD: 0.1 % (ref 0–8)
EOSINOPHIL NFR BLD: 0.3 % (ref 0–8)
ERYTHROCYTE [DISTWIDTH] IN BLOOD BY AUTOMATED COUNT: 19.1 % (ref 11.5–14.5)
ERYTHROCYTE [DISTWIDTH] IN BLOOD BY AUTOMATED COUNT: 20.2 % (ref 11.5–14.5)
EST. GFR  (AFRICAN AMERICAN): >60 ML/MIN/1.73 M^2
EST. GFR  (NON AFRICAN AMERICAN): >60 ML/MIN/1.73 M^2
FOLATE SERPL-MCNC: 7.3 NG/ML (ref 4–24)
GLUCOSE SERPL-MCNC: 110 MG/DL (ref 70–110)
HCT VFR BLD AUTO: 22.1 % (ref 37–48.5)
HCT VFR BLD AUTO: 30.6 % (ref 37–48.5)
HGB BLD-MCNC: 6.6 G/DL (ref 12–16)
HGB BLD-MCNC: 9.6 G/DL (ref 12–16)
HYPOCHROMIA BLD QL SMEAR: ABNORMAL
IMM GRANULOCYTES # BLD AUTO: 0.12 K/UL (ref 0–0.04)
IMM GRANULOCYTES # BLD AUTO: 0.19 K/UL (ref 0–0.04)
IMM GRANULOCYTES NFR BLD AUTO: 1.7 % (ref 0–0.5)
IMM GRANULOCYTES NFR BLD AUTO: 2.7 % (ref 0–0.5)
LYMPHOCYTES # BLD AUTO: 1.4 K/UL (ref 1–4.8)
LYMPHOCYTES # BLD AUTO: 1.7 K/UL (ref 1–4.8)
LYMPHOCYTES NFR BLD: 19.8 % (ref 18–48)
LYMPHOCYTES NFR BLD: 23.9 % (ref 18–48)
MAGNESIUM SERPL-MCNC: 2 MG/DL (ref 1.6–2.6)
MCH RBC QN AUTO: 31.8 PG (ref 27–31)
MCH RBC QN AUTO: 32.4 PG (ref 27–31)
MCHC RBC AUTO-ENTMCNC: 29.9 G/DL (ref 32–36)
MCHC RBC AUTO-ENTMCNC: 31.4 G/DL (ref 32–36)
MCV RBC AUTO: 101 FL (ref 82–98)
MCV RBC AUTO: 108 FL (ref 82–98)
MONOCYTES # BLD AUTO: 0.5 K/UL (ref 0.3–1)
MONOCYTES # BLD AUTO: 0.5 K/UL (ref 0.3–1)
MONOCYTES NFR BLD: 6.7 % (ref 4–15)
MONOCYTES NFR BLD: 7.2 % (ref 4–15)
NEUTROPHILS # BLD AUTO: 4.8 K/UL (ref 1.8–7.7)
NEUTROPHILS # BLD AUTO: 5 K/UL (ref 1.8–7.7)
NEUTROPHILS NFR BLD: 67 % (ref 38–73)
NEUTROPHILS NFR BLD: 69.6 % (ref 38–73)
NRBC BLD-RTO: 0 /100 WBC
NRBC BLD-RTO: 0 /100 WBC
PHOSPHATE SERPL-MCNC: 3.3 MG/DL (ref 2.7–4.5)
PLATELET # BLD AUTO: 492 K/UL (ref 150–450)
PLATELET # BLD AUTO: 500 K/UL (ref 150–450)
PLATELET BLD QL SMEAR: ABNORMAL
PMV BLD AUTO: 10.5 FL (ref 9.2–12.9)
PMV BLD AUTO: 11.3 FL (ref 9.2–12.9)
POTASSIUM SERPL-SCNC: 4.3 MMOL/L (ref 3.5–5.1)
PROT SERPL-MCNC: 6.2 G/DL (ref 6–8.4)
RBC # BLD AUTO: 2.04 M/UL (ref 4–5.4)
RBC # BLD AUTO: 3.02 M/UL (ref 4–5.4)
SODIUM SERPL-SCNC: 139 MMOL/L (ref 136–145)
TRANS ERYTHROCYTES VOL PATIENT: NORMAL ML
VIT B12 SERPL-MCNC: 1920 PG/ML (ref 210–950)
WBC # BLD AUTO: 7.11 K/UL (ref 3.9–12.7)
WBC # BLD AUTO: 7.12 K/UL (ref 3.9–12.7)

## 2022-04-08 PROCEDURE — 99900035 HC TECH TIME PER 15 MIN (STAT)

## 2022-04-08 PROCEDURE — 84100 ASSAY OF PHOSPHORUS: CPT

## 2022-04-08 PROCEDURE — 94640 AIRWAY INHALATION TREATMENT: CPT

## 2022-04-08 PROCEDURE — 94668 MNPJ CHEST WALL SBSQ: CPT

## 2022-04-08 PROCEDURE — 36415 COLL VENOUS BLD VENIPUNCTURE: CPT

## 2022-04-08 PROCEDURE — 99233 PR SUBSEQUENT HOSPITAL CARE,LEVL III: ICD-10-PCS | Mod: ,,, | Performed by: EMERGENCY MEDICINE

## 2022-04-08 PROCEDURE — P9021 RED BLOOD CELLS UNIT: HCPCS | Performed by: STUDENT IN AN ORGANIZED HEALTH CARE EDUCATION/TRAINING PROGRAM

## 2022-04-08 PROCEDURE — 25000242 PHARM REV CODE 250 ALT 637 W/ HCPCS

## 2022-04-08 PROCEDURE — 94799 UNLISTED PULMONARY SVC/PX: CPT

## 2022-04-08 PROCEDURE — 63600175 PHARM REV CODE 636 W HCPCS: Performed by: STUDENT IN AN ORGANIZED HEALTH CARE EDUCATION/TRAINING PROGRAM

## 2022-04-08 PROCEDURE — 25000003 PHARM REV CODE 250

## 2022-04-08 PROCEDURE — 63600175 PHARM REV CODE 636 W HCPCS

## 2022-04-08 PROCEDURE — 20000000 HC ICU ROOM

## 2022-04-08 PROCEDURE — 36430 TRANSFUSION BLD/BLD COMPNT: CPT

## 2022-04-08 PROCEDURE — 85025 COMPLETE CBC W/AUTO DIFF WBC: CPT

## 2022-04-08 PROCEDURE — 99233 SBSQ HOSP IP/OBS HIGH 50: CPT | Mod: ,,, | Performed by: EMERGENCY MEDICINE

## 2022-04-08 PROCEDURE — 80053 COMPREHEN METABOLIC PANEL: CPT

## 2022-04-08 PROCEDURE — 94664 DEMO&/EVAL PT USE INHALER: CPT

## 2022-04-08 PROCEDURE — 27000221 HC OXYGEN, UP TO 24 HOURS

## 2022-04-08 PROCEDURE — S4991 NICOTINE PATCH NONLEGEND: HCPCS

## 2022-04-08 PROCEDURE — 27000207 HC ISOLATION

## 2022-04-08 PROCEDURE — 27000646 HC AEROBIKA DEVICE

## 2022-04-08 PROCEDURE — 83735 ASSAY OF MAGNESIUM: CPT

## 2022-04-08 PROCEDURE — 25000003 PHARM REV CODE 250: Performed by: STUDENT IN AN ORGANIZED HEALTH CARE EDUCATION/TRAINING PROGRAM

## 2022-04-08 PROCEDURE — 94761 N-INVAS EAR/PLS OXIMETRY MLT: CPT

## 2022-04-08 RX ORDER — METHOCARBAMOL 500 MG/1
500 TABLET, FILM COATED ORAL 3 TIMES DAILY
Status: DISCONTINUED | OUTPATIENT
Start: 2022-04-08 | End: 2022-04-08

## 2022-04-08 RX ORDER — FOLIC ACID 1 MG/1
1 TABLET ORAL DAILY
Status: DISCONTINUED | OUTPATIENT
Start: 2022-04-08 | End: 2022-04-10 | Stop reason: HOSPADM

## 2022-04-08 RX ORDER — CYANOCOBALAMIN (VITAMIN B-12) 250 MCG
250 TABLET ORAL DAILY
Status: DISCONTINUED | OUTPATIENT
Start: 2022-04-08 | End: 2022-04-10 | Stop reason: HOSPADM

## 2022-04-08 RX ORDER — ACETAMINOPHEN 500 MG
500 TABLET ORAL EVERY 6 HOURS PRN
Status: DISCONTINUED | OUTPATIENT
Start: 2022-04-08 | End: 2022-04-10 | Stop reason: HOSPADM

## 2022-04-08 RX ORDER — ALUMINUM HYDROXIDE, MAGNESIUM HYDROXIDE, AND SIMETHICONE 2400; 240; 2400 MG/30ML; MG/30ML; MG/30ML
30 SUSPENSION ORAL EVERY 6 HOURS PRN
Status: DISCONTINUED | OUTPATIENT
Start: 2022-04-08 | End: 2022-04-08

## 2022-04-08 RX ORDER — ALUMINUM HYDROXIDE, MAGNESIUM HYDROXIDE, AND SIMETHICONE 2400; 240; 2400 MG/30ML; MG/30ML; MG/30ML
30 SUSPENSION ORAL EVERY 4 HOURS PRN
Status: DISCONTINUED | OUTPATIENT
Start: 2022-04-08 | End: 2022-04-10 | Stop reason: HOSPADM

## 2022-04-08 RX ORDER — METHOCARBAMOL 500 MG/1
500 TABLET, FILM COATED ORAL 3 TIMES DAILY
Status: DISCONTINUED | OUTPATIENT
Start: 2022-04-08 | End: 2022-04-10 | Stop reason: HOSPADM

## 2022-04-08 RX ORDER — ALBUTEROL SULFATE 2.5 MG/.5ML
2.5 SOLUTION RESPIRATORY (INHALATION)
Status: DISCONTINUED | OUTPATIENT
Start: 2022-04-09 | End: 2022-04-10 | Stop reason: HOSPADM

## 2022-04-08 RX ORDER — SODIUM CHLORIDE FOR INHALATION 3 %
4 VIAL, NEBULIZER (ML) INHALATION
Status: DISCONTINUED | OUTPATIENT
Start: 2022-04-09 | End: 2022-04-10 | Stop reason: HOSPADM

## 2022-04-08 RX ORDER — METHOCARBAMOL 500 MG/1
500 TABLET, FILM COATED ORAL ONCE
Status: COMPLETED | OUTPATIENT
Start: 2022-04-08 | End: 2022-04-08

## 2022-04-08 RX ORDER — KETOROLAC TROMETHAMINE 15 MG/ML
15 INJECTION, SOLUTION INTRAMUSCULAR; INTRAVENOUS ONCE
Status: COMPLETED | OUTPATIENT
Start: 2022-04-08 | End: 2022-04-08

## 2022-04-08 RX ORDER — HYDROCODONE BITARTRATE AND ACETAMINOPHEN 500; 5 MG/1; MG/1
TABLET ORAL
Status: DISCONTINUED | OUTPATIENT
Start: 2022-04-08 | End: 2022-04-10 | Stop reason: HOSPADM

## 2022-04-08 RX ORDER — METHOCARBAMOL 500 MG/1
500 TABLET, FILM COATED ORAL 2 TIMES DAILY
Status: DISCONTINUED | OUTPATIENT
Start: 2022-04-08 | End: 2022-04-08

## 2022-04-08 RX ORDER — IBUPROFEN 400 MG/1
400 TABLET ORAL EVERY 6 HOURS PRN
Status: DISCONTINUED | OUTPATIENT
Start: 2022-04-08 | End: 2022-04-10 | Stop reason: HOSPADM

## 2022-04-08 RX ADMIN — FOLIC ACID 1 MG: 1 TABLET ORAL at 09:04

## 2022-04-08 RX ADMIN — METHOCARBAMOL 500 MG: 500 TABLET ORAL at 02:04

## 2022-04-08 RX ADMIN — CYANOCOBALAMIN TAB 250 MCG 250 MCG: 250 TAB at 09:04

## 2022-04-08 RX ADMIN — PANTOPRAZOLE SODIUM 40 MG: 40 TABLET, DELAYED RELEASE ORAL at 09:04

## 2022-04-08 RX ADMIN — HYDROXYZINE HYDROCHLORIDE 25 MG: 25 TABLET ORAL at 07:04

## 2022-04-08 RX ADMIN — ALUMINUM HYDROXIDE, MAGNESIUM HYDROXIDE, AND DIMETHICONE 30 ML: 400; 400; 40 SUSPENSION ORAL at 10:04

## 2022-04-08 RX ADMIN — METHOCARBAMOL 500 MG: 500 TABLET, FILM COATED ORAL at 04:04

## 2022-04-08 RX ADMIN — Medication 6 MG: at 08:04

## 2022-04-08 RX ADMIN — ALBUTEROL SULFATE 2.5 MG: 2.5 SOLUTION RESPIRATORY (INHALATION) at 07:04

## 2022-04-08 RX ADMIN — THERA TABS 1 TABLET: TAB at 09:04

## 2022-04-08 RX ADMIN — METHOCARBAMOL 500 MG: 500 TABLET, FILM COATED ORAL at 08:04

## 2022-04-08 RX ADMIN — HYDROXYZINE HYDROCHLORIDE 25 MG: 25 TABLET ORAL at 09:04

## 2022-04-08 RX ADMIN — FLUCONAZOLE 150 MG: 40 POWDER, FOR SUSPENSION ORAL at 09:04

## 2022-04-08 RX ADMIN — HEPARIN SODIUM 5000 UNITS: 5000 INJECTION INTRAVENOUS; SUBCUTANEOUS at 08:04

## 2022-04-08 RX ADMIN — SODIUM CHLORIDE SOLN NEBU 3% 4 ML: 3 NEBU SOLN at 12:04

## 2022-04-08 RX ADMIN — ALUMINUM HYDROXIDE, MAGNESIUM HYDROXIDE, AND DIMETHICONE 30 ML: 400; 400; 40 SUSPENSION ORAL at 07:04

## 2022-04-08 RX ADMIN — ALBUTEROL SULFATE 2.5 MG: 2.5 SOLUTION RESPIRATORY (INHALATION) at 12:04

## 2022-04-08 RX ADMIN — SODIUM CHLORIDE SOLN NEBU 3% 4 ML: 3 NEBU SOLN at 07:04

## 2022-04-08 RX ADMIN — HEPARIN SODIUM 5000 UNITS: 5000 INJECTION INTRAVENOUS; SUBCUTANEOUS at 09:04

## 2022-04-08 RX ADMIN — NICOTINE 1 PATCH: 14 PATCH, EXTENDED RELEASE TRANSDERMAL at 07:04

## 2022-04-08 RX ADMIN — ACETAMINOPHEN 500 MG: 500 TABLET ORAL at 08:04

## 2022-04-08 RX ADMIN — ALUMINUM HYDROXIDE, MAGNESIUM HYDROXIDE, AND DIMETHICONE 30 ML: 400; 400; 40 SUSPENSION ORAL at 01:04

## 2022-04-08 RX ADMIN — ACETAMINOPHEN 500 MG: 500 TABLET ORAL at 01:04

## 2022-04-08 RX ADMIN — HYDROXYZINE HYDROCHLORIDE 25 MG: 25 TABLET ORAL at 10:04

## 2022-04-08 RX ADMIN — METHOCARBAMOL 500 MG: 500 TABLET ORAL at 09:04

## 2022-04-08 RX ADMIN — KETOROLAC TROMETHAMINE 15 MG: 15 INJECTION, SOLUTION INTRAMUSCULAR; INTRAVENOUS at 01:04

## 2022-04-08 RX ADMIN — ALTEPLASE 2 MG: 2.2 INJECTION, POWDER, LYOPHILIZED, FOR SOLUTION INTRAVENOUS at 06:04

## 2022-04-08 NOTE — ASSESSMENT & PLAN NOTE
Acute hepatic metabolic encephalopathy  Elevated transaminases  Elevated total bilirubin    37 y/o F with PMHx IVDU, polysubstance abuse presenting to [facility] [place] as transfer from [facility] for hepatology evaluation. Initially presented with concerns for acute liver failure (encephalopathy, elevated elevated elevation of LFTs, jaundice) vs pSBO vs sepsis 2/2 abdominal infection. NGT decompression of pSBO with good response, advanced to CLD. Started on broad spectrum abx and tapered. Started on lactulose and rifaximin with good response of encephalopathy, now A&O x 4 without signs of asterixis, tongue fasciculations, hallucinations. Overall etiology may be 2/2 to extensive outpatient Tylenol use (states she used to take handfuls of Tylenol for her initial abdominal pain) vs other drug interactions vs cholestatic injury? Transferred to [facility] [place] for further hepatological evaluation.    - LFTs remain elevated, but interval improvement; synthetic function intact  - Per hepatology recommendations, can defer MRCP and liver biopsy as LFTs appropriately improving  - Hepatology consulted and following; appreciate assistance  - Acute hepatitis panel negative  - HIV negative  - US liver doppler / abdomen did not demonstrate appreciable biliary duct dilatation  - Lactulose 20 BID (titrate to 4-5 BMs per day)  - Rifaximin  - Ceruloplasmin wnl, Copper low  - ELIN, Anti-Sm negative  - PT/INR daily  - Ferritin elevated  - Anti alpha 1 trypsin unremarkable  - Ascites studies unremarkable for SBP  - Trend CMP daily  - Trend CBC daily  - Monitor neurological status

## 2022-04-08 NOTE — PLAN OF CARE
"CMICU DAILY GOALS     VSS, titrated to RA from 3L NC, satting > 92% per O2 goal. Pt refused BiPAPHS, states "I can't do that anymore". RN re-educated patient on importance of BiPAP and plan of care. Pt still refused. Pt requested pads protecting bony prominences come off. RN educated advantages of padding and its role in preventing breakdown. Pt verbalizes understanding and still wanted padding off. RN removed padding per pt request. Pt AAOx4. Pt afebrile during shift. MAPs maintained > 60 without vasopressor support, but SBP remains in 80s-90s, which were lower than previous night. MD notified. CBC collected, Hgb 6.6. 1u PRBC administered. Pt tolerated administration with no acute distress NSR-ST on monitor with no ectopy noted. Turned Q2. Bed alarm set, SR x3. Boyfriend at bedside. Assessment per flow sheet, patient progressing towards goals as tolerated, plan of care reviewed with patient, all concerns addressed.     Sebastian South    A: Awake    RASS: Goal - RASS Goal: 0-->alert and calm  Actual - RASS (Wan Agitation-Sedation Scale): 0-->alert and calm   Restraint necessity: Clinical Justification: Treatment Interference  B: Breathe   SBT: Not intubated   C: Coordinate A & B, analgesics/sedatives   Pain: managed    SAT: Not intubated  D: Delirium   CAM-ICU: Overall CAM-ICU: Negative  E: Early(intubated/ Progressive (non-intubated) Mobility   MOVE Screen: Pass   Activity: Activity Management: Rolling - L1  FAS: Feeding/Nutrition   Diet order: Diet/Nutrition Received: regular, Specialty Diet/Nutrition Received: other (see comments) (Isosource 1.5 continuos via NGT)  T: Thrombus   DVT prophylaxis: VTE Required Core Measure: Pharmacological prophylaxis initiated/maintained  H: HOB Elevation   Head of Bed (HOB) Positioning: HOB at 30-45 degrees  U: Ulcer Prophylaxis   GI: yes  G: Glucose control   managed    S: Skin   Bathing/Skin Care: electrode patches/site rotation, dressed/undressed, bath, partial, " "incontinence care, linen changed  Device Skin Pressure Protection: absorbent pad utilized/changed, adhesive use limited, positioning supports utilized, pressure points protected, other (see comments) (bony prominence not padded due to patient taking off foam padding by self and states "they don't work and I don't wanna use them". RN educated on purpose of foam padding in prevention of ulcerations and breakdown on bony prominences. Pt refuses.)  Pressure Reduction Devices: positioning supports utilized, specialty bed utilized (bony prominence not padded due to patient taking off foam padding by self and states "they don't work and I don't wanna use them". RN educated on purpose of foam padding in prevention of ulcerations and breakdown on bony prominences. Pt refuses.)  Pressure Reduction Techniques: frequent weight shift encouraged, pressure points protected, weight shift assistance provided  Skin Protection: adhesive use limited, incontinence pads utilized, tubing/devices free from skin contact, transparent dressing maintained  B: Bowel Function   diarrhea   I: Indwelling Catheters   Perez necessity: [REMOVED]      Urethral Catheter 03/28/22 8013 Latex-Reason for Continuing Urinary Catheterization: Critically ill in ICU and requiring hourly monitoring of intake/output   CVC necessity: Yes  D: De-escalation Antibiotics   Yes    Family/Goals of care/Code Status   Code Status: Full Code    24H Vital Sign Range  Temp:  [97.7 °F (36.5 °C)-98.5 °F (36.9 °C)]   Pulse:  []   Resp:  [14-35]   BP: ()/(51-74)   SpO2:  [90 %-100 %]      Shift Events   No acute events throughout shift        "

## 2022-04-08 NOTE — ASSESSMENT & PLAN NOTE
Patient with anemia requiring transfusion 1 u PRBC. No signs of acute GIB. MCV > 100. Likely component of malnutrition/vitamin deficiency anemia vs chronic disease.    - Transfuse Hgb < 7  - Monitor for signs of bleeding  - F/U folate/B12 levels  - Add folate, B12

## 2022-04-08 NOTE — SUBJECTIVE & OBJECTIVE
Interval History/Significant Events: AF HDS NAEON. Tolerating diet, s/p 1 uPRBC overnight, no signs of GIB. Did not require BiPAP overnight.     Review of Systems   Constitutional:  Positive for activity change and fatigue. Negative for chills, diaphoresis and fever.   HENT:  Positive for dental problem (all teeth removed previously d/t poor dentition). Negative for sneezing and sore throat.    Eyes:  Negative for pain and visual disturbance.   Respiratory:  Negative for cough, chest tightness, shortness of breath and wheezing.    Cardiovascular:  Negative for chest pain, palpitations and leg swelling.   Gastrointestinal:  Positive for abdominal distention. Negative for abdominal pain, blood in stool, constipation, diarrhea, nausea and vomiting.   Genitourinary:  Negative for dysuria, hematuria and urgency.   Musculoskeletal:  Negative for arthralgias and myalgias.   Skin:  Positive for color change (jaundice). Negative for rash.   Neurological:  Positive for weakness. Negative for tremors, syncope, light-headedness and numbness.   Psychiatric/Behavioral:  Negative for agitation and confusion.    Objective:     Vital Signs (Most Recent):  Temp: 97.9 °F (36.6 °C) (04/08/22 0530)  Pulse: 74 (04/08/22 0745)  Resp: 16 (04/08/22 0745)  BP: 110/75 (04/08/22 0600)  SpO2: 100 % (04/08/22 0745) Vital Signs (24h Range):  Temp:  [97.7 °F (36.5 °C)-98.5 °F (36.9 °C)] 97.9 °F (36.6 °C)  Pulse:  [] 74  Resp:  [14-24] 16  SpO2:  [90 %-100 %] 100 %  BP: ()/(51-75) 110/75   Weight: 22 kg (48 lb 8 oz)  Body mass index is 9.47 kg/m².      Intake/Output Summary (Last 24 hours) at 4/8/2022 1156  Last data filed at 4/8/2022 0800  Gross per 24 hour   Intake 1200.83 ml   Output 650 ml   Net 550.83 ml       Physical Exam  Constitutional:       General: She is not in acute distress.     Appearance: She is ill-appearing. She is not toxic-appearing or diaphoretic.      Comments: Cachetic, temporal wasting, jaundiced, weak, but in  no apparent distress   HENT:      Head: Normocephalic.      Comments: BiPAP, NGT     Nose: Nose normal.   Eyes:      General: Scleral icterus present.      Extraocular Movements: Extraocular movements intact.      Pupils: Pupils are equal, round, and reactive to light.   Cardiovascular:      Rate and Rhythm: Regular rhythm. Tachycardia present.      Pulses: Normal pulses.      Heart sounds: Normal heart sounds. No murmur heard.  Pulmonary:      Effort: Pulmonary effort is normal. No respiratory distress.      Breath sounds: Normal breath sounds. No stridor. No wheezing or rales.      Comments: Poor cough reflex, transmitted upper airway noise  Abdominal:      General: Abdomen is flat. There is distension.      Palpations: Abdomen is soft.      Tenderness: There is no abdominal tenderness (improved). There is no guarding or rebound.   Musculoskeletal:         General: No swelling or tenderness. Normal range of motion.      Cervical back: Normal range of motion. No rigidity or tenderness.      Right lower leg: No edema.      Left lower leg: No edema.   Skin:     General: Skin is warm and dry.      Coloration: Skin is jaundiced.      Findings: Bruising and rash (petecchial rash on back) present.   Neurological:      General: No focal deficit present.      Mental Status: She is alert and oriented to person, place, and time. Mental status is at baseline.      Cranial Nerves: No cranial nerve deficit.      Sensory: No sensory deficit.      Motor: No weakness.   Psychiatric:         Mood and Affect: Mood normal.         Behavior: Behavior normal.         Thought Content: Thought content normal.       Vents:  Vent Mode: A/C (04/01/22 0705)  Ventilator Initiated: Yes (03/30/22 0141)  Set Rate: 16 BPM (04/01/22 0705)  Vt Set: 350 mL (04/01/22 0705)  Pressure Support: 0 cmH20 (04/01/22 0705)  PEEP/CPAP: 5 cmH20 (04/01/22 0705)  Oxygen Concentration (%): 30 (04/08/22 0230)  Peak Airway Pressure: 20 cmH2O (04/01/22  0705)  Plateau Pressure: 0 cmH20 (04/01/22 0705)  Total Ve: 9.02 mL (04/01/22 0705)  Negative Inspiratory Force (cm H2O): 0 (03/30/22 1552)  F/VT Ratio<105 (RSBI): (!) 65.45 (04/01/22 0705)  Lines/Drains/Airways       Peripherally Inserted Central Catheter Line  Duration             PICC Triple Lumen left basilic -- days                  Significant Labs:    CBC/Anemia Profile:  Recent Labs   Lab 04/07/22  0334 04/07/22  0428 04/07/22  1606 04/07/22  2249   WBC 7.76 7.92  --  7.12   HGB 6.9* 7.0*  --  6.6*   HCT 22.5* 23.2*  --  22.1*    487*  --  500*   * 109*  --  108*   RDW 19.6* 19.1*  --  19.1*   FOLATE  --   --  7.3  --    [id number]  --   --  1920*  --         Chemistries:  Recent Labs   Lab 04/06/22  1506 04/07/22  0334 04/07/22  1606   * 136 135*   K 4.3 4.4 3.8   CL 91* 93* 95   CO2 34* 32* 31*   BUN 13 14 14   CREATININE 0.4* 0.4* 0.4*   CALCIUM 9.2 8.9 8.3*   ALBUMIN 2.8* 2.6* 2.5*   PROT 6.2 5.7* 5.6*   BILITOT 4.3* 3.8* 3.5*   ALKPHOS 1,117* 1,023* 985*   ALT 57* 47* 45*   AST 55* 44* 39   MG 1.7 1.8 2.4   PHOS 3.4 4.1 4.1       All pertinent labs within the past 24 hours have been reviewed.    Significant Imaging:  I have reviewed all pertinent imaging results/findings within the past 24 hours.

## 2022-04-08 NOTE — PROGRESS NOTES
[facility] - Cardiac Medical ICU  Critical Care Medicine  Progress Note    Patient Name: Carolina Baldwin  MRN: [id number]  Admission Date: 3/28/2022  Hospital Length of Stay: 11 days  Code Status: Full Code  Attending Provider: Aguilar Ferreira MD  Primary Care Provider: Primary Doctor No   Principal Problem: Liver injury    Subjective:     HPI:  37 y/o F with PMHx IVDU on suboxone, polysubstance use (meth, opioids, tobacco), gastric ulcers initially presented to OSH for increasing jaundice, lethargy, AMS, evaluation for pSBO, increased O2 requirement (3-4 L, no baseline home O2). Was started on broad spectrum Abx at OSH and eventually discontinued. Initially presented oriented x 2 and severe jaundice, elevated ammonia, but normal LFTs? Started on lactulose and rifaximin. NGT decompression at OSH for SBO with good response, advanced to CLD. Transferred to [facility] for hepatology evaluation.     On presentation to [facility] MICU, patient is overtly jaundiced but with intact mentation (a&o x4). Complains of lower back pain that she experienced from the long transport from OSH to [facility]. Also complains of increasing hunger and thirst (good appetite). Questionable historian, but understands the circumstance, stating she knows she is having new liver problems and is here for the hepatology team to evaluate her. Patient reports having good bowel movements and appropriate urinary voids. Specifically denies chest pain, increased work of breathing, increasing/worsening abdominal pain, fever/chills, nausea, vomiting, constipation, diarrhea, dysuria, suprapubic pain, recent IVDU (currently on suboxone), hallucinations (auditory and visual), tremors, seizures, loss of consciousness, headache, sensory/strength changes, abnormal bleeding.    Patient is from [place], [place]. Lives with boyfriend and 5 children. Of note, she calls her boyfriend her  but is not legally ; her closest relative is her sister. Substantial IVDU history  (meth), non-injectable opioid abuse. Recently stopped smoking history (smoked since 10 y/o). On suboxone per OSH report and per patient. Does not know if any active HIV, hepatitis infection. Family history of CAD, ACS, and cancer (unspecified).       Hospital/ICU Course:  Transferred from OSH to [facility] MICU for HLOC, hepatology evaluation, and SBO. On arrival, patient A&O x 4 with diffuse abdominal pain w/o rebound, on 5 L NC. CTAP demonstrated diffuse bowel air levels and dilatation c/w SBO. Bilateral lung effusions present with concerns for L lingula consolidation. AF VS HDS on presentation to [facility]. Bedside US without amenable ascites pocket for paracentesis. Gen Surg evaluated w/o need for surgical intervention. Started on CAP coverage for pulmonary findings. Lactulose titrated to 4-5 BM/day, rifaximin. Hepatology consulted and work up initiated. Patient desaturated on 5 L NC and subsequently stepped up to 10 L HFNC with eventual intubation. Found to be mucus plugging per CXR (L lung atelectasis with L midline shift). Levophed started during intubation, weaned off. Patient respiratory status improving. Bronch completed for mucus evacuation on 03/31. Extubated to NC with BiPAP on 04/01. Suction turned off to NGT per gen surg recs. Upgraded Cefepime coverage to Meropenem due to GNR+ Rcx while patient was intubated. Per OSH reports, there were previously concerns for esophageal candidiasis, as such, added Fluconazole. Increased airway mucus production, poor cough reflex. SLP swallow eval; NPO, TF per NGT in interim. Difficulty tolerating comfort flow trials due to poor cough reflex and inspiratory effort 2/2 deconditioning, however intermittent improvement through course. Rcx on 04/06 grew acinetobacter baumannii , pending drug sensitivities. ID following, per recommendation, discontinued meropenem without new regimen as patient clinically improving. Required 1u pRBC, macrocytic anemia 2/2 chronic disease vs  malnutrition      Interval History/Significant Events: AF HDS NAEON. Tolerating diet, s/p 1 uPRBC overnight, no signs of GIB. Did not require BiPAP overnight.     Review of Systems   Constitutional:  Positive for activity change and fatigue. Negative for chills, diaphoresis and fever.   HENT:  Positive for dental problem (all teeth removed previously d/t poor dentition). Negative for sneezing and sore throat.    Eyes:  Negative for pain and visual disturbance.   Respiratory:  Negative for cough, chest tightness, shortness of breath and wheezing.    Cardiovascular:  Negative for chest pain, palpitations and leg swelling.   Gastrointestinal:  Positive for abdominal distention. Negative for abdominal pain, blood in stool, constipation, diarrhea, nausea and vomiting.   Genitourinary:  Negative for dysuria, hematuria and urgency.   Musculoskeletal:  Negative for arthralgias and myalgias.   Skin:  Positive for color change (jaundice). Negative for rash.   Neurological:  Positive for weakness. Negative for tremors, syncope, light-headedness and numbness.   Psychiatric/Behavioral:  Negative for agitation and confusion.    Objective:     Vital Signs (Most Recent):  Temp: 97.9 °F (36.6 °C) (04/08/22 0530)  Pulse: 74 (04/08/22 0745)  Resp: 16 (04/08/22 0745)  BP: 110/75 (04/08/22 0600)  SpO2: 100 % (04/08/22 0745) Vital Signs (24h Range):  Temp:  [97.7 °F (36.5 °C)-98.5 °F (36.9 °C)] 97.9 °F (36.6 °C)  Pulse:  [] 74  Resp:  [14-24] 16  SpO2:  [90 %-100 %] 100 %  BP: ()/(51-75) 110/75   Weight: 22 kg (48 lb 8 oz)  Body mass index is 9.47 kg/m².      Intake/Output Summary (Last 24 hours) at 4/8/2022 1156  Last data filed at 4/8/2022 0800  Gross per 24 hour   Intake 1200.83 ml   Output 650 ml   Net 550.83 ml       Physical Exam  Constitutional:       General: She is not in acute distress.     Appearance: She is ill-appearing. She is not toxic-appearing or diaphoretic.      Comments: Cachetic, temporal wasting,  jaundiced, weak, but in no apparent distress   HENT:      Head: Normocephalic.      Comments: BiPAP, NGT     Nose: Nose normal.   Eyes:      General: Scleral icterus present.      Extraocular Movements: Extraocular movements intact.      Pupils: Pupils are equal, round, and reactive to light.   Cardiovascular:      Rate and Rhythm: Regular rhythm. Tachycardia present.      Pulses: Normal pulses.      Heart sounds: Normal heart sounds. No murmur heard.  Pulmonary:      Effort: Pulmonary effort is normal. No respiratory distress.      Breath sounds: Normal breath sounds. No stridor. No wheezing or rales.      Comments: Poor cough reflex, transmitted upper airway noise  Abdominal:      General: Abdomen is flat. There is distension.      Palpations: Abdomen is soft.      Tenderness: There is no abdominal tenderness (improved). There is no guarding or rebound.   Musculoskeletal:         General: No swelling or tenderness. Normal range of motion.      Cervical back: Normal range of motion. No rigidity or tenderness.      Right lower leg: No edema.      Left lower leg: No edema.   Skin:     General: Skin is warm and dry.      Coloration: Skin is jaundiced.      Findings: Bruising and rash (petecchial rash on back) present.   Neurological:      General: No focal deficit present.      Mental Status: She is alert and oriented to person, place, and time. Mental status is at baseline.      Cranial Nerves: No cranial nerve deficit.      Sensory: No sensory deficit.      Motor: No weakness.   Psychiatric:         Mood and Affect: Mood normal.         Behavior: Behavior normal.         Thought Content: Thought content normal.       Vents:  Vent Mode: A/C (04/01/22 0705)  Ventilator Initiated: Yes (03/30/22 0141)  Set Rate: 16 BPM (04/01/22 0705)  Vt Set: 350 mL (04/01/22 0705)  Pressure Support: 0 cmH20 (04/01/22 0705)  PEEP/CPAP: 5 cmH20 (04/01/22 0705)  Oxygen Concentration (%): 30 (04/08/22 0230)  Peak Airway Pressure: 20 cmH2O  (04/01/22 0705)  Plateau Pressure: 0 cmH20 (04/01/22 0705)  Total Ve: 9.02 mL (04/01/22 0705)  Negative Inspiratory Force (cm H2O): 0 (03/30/22 1552)  F/VT Ratio<105 (RSBI): (!) 65.45 (04/01/22 0705)  Lines/Drains/Airways       Peripherally Inserted Central Catheter Line  Duration             PICC Triple Lumen left basilic -- days                  Significant Labs:    CBC/Anemia Profile:  Recent Labs   Lab 04/07/22  0334 04/07/22  0428 04/07/22  1606 04/07/22  2249   WBC 7.76 7.92  --  7.12   HGB 6.9* 7.0*  --  6.6*   HCT 22.5* 23.2*  --  22.1*    487*  --  500*   * 109*  --  108*   RDW 19.6* 19.1*  --  19.1*   FOLATE  --   --  7.3  --    GBERRFLH22  --   --  1920*  --         Chemistries:  Recent Labs   Lab 04/06/22  1506 04/07/22  0334 04/07/22  1606   * 136 135*   K 4.3 4.4 3.8   CL 91* 93* 95   CO2 34* 32* 31*   BUN 13 14 14   CREATININE 0.4* 0.4* 0.4*   CALCIUM 9.2 8.9 8.3*   ALBUMIN 2.8* 2.6* 2.5*   PROT 6.2 5.7* 5.6*   BILITOT 4.3* 3.8* 3.5*   ALKPHOS 1,117* 1,023* 985*   ALT 57* 47* 45*   AST 55* 44* 39   MG 1.7 1.8 2.4   PHOS 3.4 4.1 4.1       All pertinent labs within the past 24 hours have been reviewed.    Significant Imaging:  I have reviewed all pertinent imaging results/findings within the past 24 hours.      ABG  Recent Labs   Lab 04/04/22  1841   PH 7.412   PO2 29*   PCO2 61.5*   HCO3 39.2*   BE 15     Assessment/Plan:     Psychiatric  Polysubstance abuse  Opioid abuse  Tobacco abuse  Methamphetamine abuse    Noted to be on suboxone through suboxone clinic. Unclear historical path.    - Addiction Psych consult for suboxone; per their report, patient is not on suboxone regularly and not interested at this time  - Urine toxicology negative at this time  - HIV negative  - Hep panel negative    Pulmonary  Acute hypoxemic respiratory failure  Bilateral pleural effusions  Pleural consolidation, L  Atelectasis  Mucus Plugging    No baseline home O2 per patient. Presenting with 5 L NC  O2. Unclear etiology, though previous documentation suggestive of pleural effusion vs component of PNA. Unclear etiology as patient seems to be volume down on exam. No reported history of heart failure or COPD (though extensive tobacco use history). Etiology may be 2/2 to abdominal distention causing diaphragmatic pressure vs infectious etiology vs pleural effusion d/t third spacing vs infection? CT demonstrating bilateral pleural effusions and lingula consolidation; concerns for possible PNA. 03/29 patient decompensated while on 5L > 10 HFNC > intubation; CXR demonstrating L lung atelectasis / mucus plugging. 03/31 bronchoscopy for mucus evacuation; RCx sent, mild improvement on CXR following. Extubated to NC on 04/01 to BiPAP. Continue weaning supplemental O2 down; challenging given poor cough reflex and copious mucus production.    - Oxygen requirement appears largely 2/2 poor mechanical ventilation d/t deconditioning with component of mucus plugging d/t poor cough reflex  - On O2 currently, BiPAP qhs  - RCx with acinetobacter baumannii , ID following, susceptibility limited to TMP-SMX / Avycaz; meropenem off  - Fluconazole for OSH report of esophageal candidiasis   - Aggressive pulmonary hygiene, CPT, albuterol inhaler, nebulized saline, cough assist  - Repeat CXR demonstrates relative increased lung volumes, resolving atelectasis  - Continuous pulse ox  - Monitor fever curve; given lack of infectious presentation, will defer abx course now  - Lactate wnl  - Monitor BCx, RCx  - Aspiration precaution    Cardiac/Vascular  CHF (congestive heart failure)  Patient presenting with BNP 1200+, Repeat TTE 03/29, EF 35-40%, diastolic dysfunction. PAP 34 mmHg. No WMA per echo. Initial CXR demonstrated concerns for congestion vs effusion.    - Clinically euvolemic now  - Holding Lasix for now given euvolemic vs hypovolemic state; PRN Lasix to remain net even  - Strict I/Os  - GDMT at future time as tolerated      Paroxysmal tachycardia  Presenting with tachycardia to 120s. Noted in OSH previous progress note and was tempered with BB. Unclear etiology, potentially volume down driving sinus tachycardia as patient has been on NGT suction d/t pSBO with limited PO intake. Also appears to be extremely malnourished. In setting of potential acute pulmonary process, may be related to acute infection vs mucus plugging / atelectasis    - Repeat EKG demonstrating sinus tachycardia and abnormal R wave progression; no previous EKG for comparison  - Cardiac telemetry  - Electrolyte trend, K > 4, Mg > 2  - Will hold trial of IVF bolus until further evaluation regarding volume status (BNP elevated 1000+ on admission)  - TTE revealing EF 35-40%, PAP 34,       ID  Esophageal candidiasis  - Continue fluconazole for total course 14-21 days    Oncology  Macrocytic anemia  Patient with anemia requiring transfusion 1 u PRBC. No signs of acute GIB. MCV > 100. Likely component of malnutrition/vitamin deficiency anemia vs chronic disease.    - Transfuse Hgb < 7  - Monitor for signs of bleeding  - F/U folate/B12 levels  - Add folate, B12    GI  Chronic biliary pancreatitis  Cholelithiasis    Patient family reports that she has had 3+ year history of abdominal pain secondary that they believe is due to her pancreas. Since then, she has had decreasing PO intake and loss of weight due to abdominal discomfort. Family states that patient did not regularly seek treatment for this issue, but used Tylenol for pain. Recently admitted in 03/2022 for gallstone pancreatitis at [facility], s/p lap cholecystectomy, c/b pSBO.    - See Failure to Thrive A/P    Generalized abdominal pain  H/o pSBO  H/o laparoscopic cholecystectomy  H/o chronic biliary pancreatitis   Dysphagia    Patient with recent h/o pSBO at OSH,with good response to NGT decompression. Advanced to CLD. Initially complaining of RUQ abdominal pain at OSH on initial presentation. Now with mild,  diffuse abdominal pain on palpation, but none at rest. Abdomen remains distended. Hypoactive bowel sounds. Repeat KUB / CTAP demonstrating significant bowel dilatation aw/ air-fluid levels c/w SBO. NGT suction d/c on 04/01 after extubation    - Monitor BM, diet tolerance (currently passing BM and tolerating TF)  - NPO pending SLP (poor cough), ADAT following; TF per NGT in interim  - General surgery evaluated, no role for surgical intervention  - Bedside US abdomen without note for ascites or other process  - Serial abdominal exams    Orthopedic  * Liver injury  Acute hepatic metabolic encephalopathy  Elevated transaminases  Elevated total bilirubin    37 y/o F with PMHx IVDU, polysubstance abuse presenting to [facility] as transfer from [facility] for hepatology evaluation. Initially presented with concerns for acute liver failure (encephalopathy, elevated elevated elevation of LFTs, jaundice) vs pSBO vs sepsis 2/2 abdominal infection. NGT decompression of pSBO with good response, advanced to CLD. Started on broad spectrum abx and tapered. Started on lactulose and rifaximin with good response of encephalopathy, now A&O x 4 without signs of asterixis, tongue fasciculations, hallucinations. Overall etiology may be 2/2 to extensive outpatient Tylenol use (states she used to take handfuls of Tylenol for her initial abdominal pain) vs other drug interactions vs cholestatic injury? Transferred to [facility] for further hepatological evaluation.    - LFTs remain elevated, but interval improvement; synthetic function intact  - Per hepatology recommendations, can defer MRCP and liver biopsy as LFTs appropriately improving  - Hepatology consulted and following; appreciate assistance  - Acute hepatitis panel negative  - HIV negative  - US liver doppler / abdomen did not demonstrate appreciable biliary duct dilatation  - Lactulose 20 BID (titrate to 4-5 BMs per day)  - Rifaximin  - Ceruloplasmin wnl, Copper low  - ELIN, Anti-Sm  negative  - PT/INR daily  - Ferritin elevated  - Anti alpha 1 trypsin unremarkable  - Ascites studies unremarkable for SBP  - Trend CMP daily  - Trend CBC daily  - Monitor neurological status    Other  Failure to thrive in adult  Temporal wasting  Malnutrition    Setting of 3 year history of chronic pancreatitis without treatment and recent admission for gallstone pancreatitis s/p lap jim c/b pSBO, patient has had significantly decreased PO intake in conjunction with regular meth use.    - Nutrition consulted, appreciate assistance  - High calorie/high protein diet as tolerated  - Boost TIDWM       Critical Care Daily Checklist:    A: Awake: RASS Goal/Actual Goal: RASS Goal: 0-->alert and calm  Actual: Wan Agitation Sedation Scale (RASS): Drowsy   B: Spontaneous Breathing Trial Performed? Spon. Breathing Trial Initiated?: Initiated (04/01/22 1018)   C: SAT & SBT Coordinated?  N/A                      D: Delirium: CAM-ICU Overall CAM-ICU: Negative   E: Early Mobility Performed? Yes   F: Feeding Goal: Goals: Meet % EEN, EPN by RD f/u date  Status: Nutrition Goal Status: progressing towards goal   Current Diet Order   Procedures    Diet Adult Regular (IDDSI Level 7) [facility]; High Protein/High Calorie     Order Specific Question:   Indicate patient location for additional diet options:     Answer:   [facility]     Order Specific Question:   Additional Diet Options:     Answer:   High Protein/High Calorie      AS: Analgesia/Sedation N/A   T: Thromboembolic Prophylaxis heparin   H: HOB > 300 Yes   U: Stress Ulcer Prophylaxis (if needed) Protonix   G: Glucose Control SSI   B: Bowel Function Stool Occurrence: 1   I: Indwelling Catheter (Lines & Perez) Necessity PICC   D: De-escalation of Antimicrobials/Pharmacotherapies Fluconazole    Plan for the day/ETD Stepdown to HM, continue PT/OT    Code Status:  Family/Goals of Care: Full Code         Critical secondary to Patient has a condition that  poses threat to life and bodily function: STEPDOWN TO       Critical care was time spent personally by me on the following activities: development of treatment plan with patient or surrogate and bedside caregivers, discussions with consultants, evaluation of patient's response to treatment, examination of patient, ordering and performing treatments and interventions, ordering and review of laboratory studies, ordering and review of radiographic studies, pulse oximetry, re-evaluation of patient's condition. This critical care time did not overlap with that of any other provider or involve time for any procedures.     Geoffrey Gloria MD  Critical Care Medicine  [facility] - Cardiac Medical ICU

## 2022-04-08 NOTE — PHYSICIAN QUERY
PT Name: Carolina Baldwin  MR #: [id number]     DOCUMENTATION CLARIFICATION     CDS: Brandy Capley, RN  Email: [email or web address]    This form is a permanent document in the medical record.    Query Date: April 8, 2022    By submitting this query, we are merely seeking further clarification of documentation.  Please utilize your independent clinical judgment when addressing the question(s) below.  The Medical Record contains the following:   Indicators   Supporting Clinical Findings Location in Medical Record   X Pneumonia documented   Acute hypoxemic respiratory failure  No baseline home O2 per patient. Presenting with 5 L NC O2. Unclear etiology, though previous documentation suggestive of pleural effusion vs component of PNA.    Cefepime and azithromycin for CAP coverage (recent hospitalization for pseudomonas coverage)    Meropenem for VAP coverage   H&P 3/28, filed 3/29        Critical care medicine progress notes 3/29    Critical care medicine progress notes 4/4     X Chest X-Ray/CT Scan Bilateral airspace infiltrates/edema in a primarily perihilar distribution.     Retrocardiac airspace consolidation, left more than right.  Partial silhouette of the hemidiaphragm on the left.    Stable bilateral airspace opacities.    Lung Bases:   Small bilateral pleural effusions with adjacent atelectatic/consolidative change.  There are multifocal ground-glass and more consolidative opacities identified throughout the lower lung zones.  There is peribronchial thickening and consolidative opacity in the medial right lower lung zone (axial series 2, image 5).  Overall findings concerning for multifocal infectious process.   CXR 3/28          CXR 4/3    CT abd 3/29   X PaO2    PaCO2     O2 sat  04/03/22 14:44   POC PH 7.216 (LL)   POC PCO2 109.3 (HH)   POC PO2 83   POC BE 17   POC HCO3 44.3 (H)   POC SATURATED O2 92 (L)   POC TCO2 48 (H)   FiO2 80   Sample ARTERIAL   DelSys CPAP/BiPAP   Allens Test Pass   Site RR   Mode BiPAP    Rate 12      ABG 4/3 1444   X WBC  03/28/22 21:27 03/29/22 08:20 03/30/22 04:25 03/31/22 02:51 04/01/22 03:07 04/02/22 04:00 04/03/22 03:02 4/4/22  03:54   WBC 9.25 10.08 15.32 (H) 11.03 10.95 10.24 10.44 13.21 (H)      Labs 3/28-4/4   X Vital Signs Vital Signs (24h Range):  Temp:  [98 °F (36.7 °C)-99.9 °F (37.7 °C)] 98 °F (36.7 °C)  Pulse:  [117-125] 117  Resp:  [0-37] 0  SpO2:  [96 %-98 %] 98 %  BP: (116-132)/(90-96) 129/90    Vital Signs (24h Range):  Temp:  [97.1 °F (36.2 °C)-98.6 °F (37 °C)] 98.6 °F (37 °C)  Pulse:  [] 119  Resp:  [22-74] 28  SpO2:  [65 %-100 %] 99 %  BP: ()/() 116/84   H&P 3/28, filed 3/29              Critical care medicine progress notes 4/4   X Cultures  Suspect  cultured in sputum is a colonizer Monitor off antibiotics. If she develops increasing oxygen requirements and concern for pneumonia then would start Bactrim and reconsult ID.     ACINETOBACTER BAUMANNII   Many  Specimen Type: Respiratory  Specimen Source: Sputum, Expectorated    Unfortunately samples were not collected during bronchoscopy.    ID plan of care 4/7      Respiratory culture 3/31        ID consult 4/4      Treatment      X Supplemental O2 Patient was stable on about 10L HFNC for 1-2 hours, appeared more comfortable and was conversing with us. CXR with complete left lung atelectasis. She was stable at the time, so continued with aggressive pulmonary toileting with RT as above. Around 1AM, her sats dropped again. Her blood pressure dropped as well, requiring initiation of vasopressors. At this time, anesthesia was called and she was intubated.     Pt. extubated and placed on 2LNC   Critical care medicine care update 3/29, filed 3/30            RT care update 4/1 1501    Dysphagia/Swallow study     X Other Date of Operation: 03/31/2022  Post-op Diagnosis: Mucus plugging  Operation: Flexible fiberoptic bronchoscopy    There were copious thick, green secretions that required frequent advancement and  withdrawal of the bronchoscope to remove the thick secretions.      The scope was sequentially passed into the left main and then left upper and lower bronchi and segmental bronchi. The scope was then withdrawn and advanced into the right main bronchus and then into the RUL, RML, and RLL bronchi and segmental bronchi. After airway clearance the airways all appeared patent. Critical care medicine procedures 3/31     Provider, please clarify pneumonia (and POA status if ruled in):     [ xx  ] Pneumonia ruled in (please further specify contributing organism/s):  (  ) Acinetobacter Baumannii   (  ) Other (please specify):  ____________________  (  xx) clinically undetermined     [   ] Pneumonia ruled out     [   ] Other respiratory diagnosis (please specify): _________     [  ] Clinically undetermined       Present on admission (POA) status:    [   ] Yes (Y)     [   ] No (N)     [   ] Documentation insufficient to determine if condition is POA (U)     [ x ] Clinically Undetermined (W)     Please document in your progress notes daily for the duration of treatment, until resolved, and include in your discharge summary.     Form No. [id number]

## 2022-04-08 NOTE — NURSING
Pt PICC line stopped drawing back on all lumens. Unable to aspirate blood for labs. CathFlow administered x1. Attempted to collect labs manually with butterfly needles. Pt veins too frail, small, and fragile. Attempted x2. Unable to collect AM labs at this time. Critical Care Team notified -- PAUL Rose, Dr. Gillies, and Critical Care Fellow aware

## 2022-04-09 LAB
ALBUMIN SERPL BCP-MCNC: 2.5 G/DL (ref 3.5–5.2)
ALBUMIN SERPL BCP-MCNC: 2.6 G/DL (ref 3.5–5.2)
ALP SERPL-CCNC: 1024 U/L (ref 55–135)
ALP SERPL-CCNC: 1067 U/L (ref 55–135)
ALT SERPL W/O P-5'-P-CCNC: 38 U/L (ref 10–44)
ALT SERPL W/O P-5'-P-CCNC: 39 U/L (ref 10–44)
ANION GAP SERPL CALC-SCNC: 6 MMOL/L (ref 8–16)
ANION GAP SERPL CALC-SCNC: 6 MMOL/L (ref 8–16)
AST SERPL-CCNC: 33 U/L (ref 10–40)
AST SERPL-CCNC: 34 U/L (ref 10–40)
BASOPHILS # BLD AUTO: 0.05 K/UL (ref 0–0.2)
BASOPHILS NFR BLD: 0.8 % (ref 0–1.9)
BILIRUB SERPL-MCNC: 3.3 MG/DL (ref 0.1–1)
BILIRUB SERPL-MCNC: 3.4 MG/DL (ref 0.1–1)
BUN SERPL-MCNC: 13 MG/DL (ref 6–20)
BUN SERPL-MCNC: 13 MG/DL (ref 6–20)
CALCIUM SERPL-MCNC: 8.3 MG/DL (ref 8.7–10.5)
CALCIUM SERPL-MCNC: 8.6 MG/DL (ref 8.7–10.5)
CHLORIDE SERPL-SCNC: 100 MMOL/L (ref 95–110)
CHLORIDE SERPL-SCNC: 96 MMOL/L (ref 95–110)
CO2 SERPL-SCNC: 29 MMOL/L (ref 23–29)
CO2 SERPL-SCNC: 34 MMOL/L (ref 23–29)
CREAT SERPL-MCNC: 0.4 MG/DL (ref 0.5–1.4)
CREAT SERPL-MCNC: 0.4 MG/DL (ref 0.5–1.4)
DIFFERENTIAL METHOD: ABNORMAL
EOSINOPHIL # BLD AUTO: 0 K/UL (ref 0–0.5)
EOSINOPHIL NFR BLD: 0.2 % (ref 0–8)
ERYTHROCYTE [DISTWIDTH] IN BLOOD BY AUTOMATED COUNT: 20.2 % (ref 11.5–14.5)
EST. GFR  (AFRICAN AMERICAN): >60 ML/MIN/1.73 M^2
EST. GFR  (AFRICAN AMERICAN): >60 ML/MIN/1.73 M^2
EST. GFR  (NON AFRICAN AMERICAN): >60 ML/MIN/1.73 M^2
EST. GFR  (NON AFRICAN AMERICAN): >60 ML/MIN/1.73 M^2
GLUCOSE SERPL-MCNC: 103 MG/DL (ref 70–110)
GLUCOSE SERPL-MCNC: 94 MG/DL (ref 70–110)
HCT VFR BLD AUTO: 29.8 % (ref 37–48.5)
HGB BLD-MCNC: 9.1 G/DL (ref 12–16)
IMM GRANULOCYTES # BLD AUTO: 0.26 K/UL (ref 0–0.04)
IMM GRANULOCYTES NFR BLD AUTO: 3.9 % (ref 0–0.5)
LYMPHOCYTES # BLD AUTO: 1.6 K/UL (ref 1–4.8)
LYMPHOCYTES NFR BLD: 23.8 % (ref 18–48)
MAGNESIUM SERPL-MCNC: 1.9 MG/DL (ref 1.6–2.6)
MAGNESIUM SERPL-MCNC: 2 MG/DL (ref 1.6–2.6)
MCH RBC QN AUTO: 31.5 PG (ref 27–31)
MCHC RBC AUTO-ENTMCNC: 30.5 G/DL (ref 32–36)
MCV RBC AUTO: 103 FL (ref 82–98)
MONOCYTES # BLD AUTO: 0.7 K/UL (ref 0.3–1)
MONOCYTES NFR BLD: 9.8 % (ref 4–15)
NEUTROPHILS # BLD AUTO: 4.1 K/UL (ref 1.8–7.7)
NEUTROPHILS NFR BLD: 61.5 % (ref 38–73)
NRBC BLD-RTO: 0 /100 WBC
PHOSPHATE SERPL-MCNC: 2.4 MG/DL (ref 2.7–4.5)
PHOSPHATE SERPL-MCNC: 2.8 MG/DL (ref 2.7–4.5)
PLATELET # BLD AUTO: 481 K/UL (ref 150–450)
PMV BLD AUTO: 10.5 FL (ref 9.2–12.9)
POTASSIUM SERPL-SCNC: 4.3 MMOL/L (ref 3.5–5.1)
POTASSIUM SERPL-SCNC: 4.6 MMOL/L (ref 3.5–5.1)
PROT SERPL-MCNC: 5.7 G/DL (ref 6–8.4)
PROT SERPL-MCNC: 5.9 G/DL (ref 6–8.4)
RBC # BLD AUTO: 2.89 M/UL (ref 4–5.4)
SODIUM SERPL-SCNC: 135 MMOL/L (ref 136–145)
SODIUM SERPL-SCNC: 136 MMOL/L (ref 136–145)
WBC # BLD AUTO: 6.6 K/UL (ref 3.9–12.7)

## 2022-04-09 PROCEDURE — 94640 AIRWAY INHALATION TREATMENT: CPT

## 2022-04-09 PROCEDURE — 99900035 HC TECH TIME PER 15 MIN (STAT)

## 2022-04-09 PROCEDURE — 63600175 PHARM REV CODE 636 W HCPCS

## 2022-04-09 PROCEDURE — 25000003 PHARM REV CODE 250: Performed by: STUDENT IN AN ORGANIZED HEALTH CARE EDUCATION/TRAINING PROGRAM

## 2022-04-09 PROCEDURE — 25000242 PHARM REV CODE 250 ALT 637 W/ HCPCS: Performed by: STUDENT IN AN ORGANIZED HEALTH CARE EDUCATION/TRAINING PROGRAM

## 2022-04-09 PROCEDURE — 25000003 PHARM REV CODE 250

## 2022-04-09 PROCEDURE — 99233 SBSQ HOSP IP/OBS HIGH 50: CPT | Mod: ,,, | Performed by: STUDENT IN AN ORGANIZED HEALTH CARE EDUCATION/TRAINING PROGRAM

## 2022-04-09 PROCEDURE — 94664 DEMO&/EVAL PT USE INHALER: CPT

## 2022-04-09 PROCEDURE — 80053 COMPREHEN METABOLIC PANEL: CPT

## 2022-04-09 PROCEDURE — 27000221 HC OXYGEN, UP TO 24 HOURS

## 2022-04-09 PROCEDURE — 83735 ASSAY OF MAGNESIUM: CPT | Mod: 91

## 2022-04-09 PROCEDURE — 85025 COMPLETE CBC W/AUTO DIFF WBC: CPT

## 2022-04-09 PROCEDURE — C1751 CATH, INF, PER/CENT/MIDLINE: HCPCS

## 2022-04-09 PROCEDURE — 84100 ASSAY OF PHOSPHORUS: CPT

## 2022-04-09 PROCEDURE — 36415 COLL VENOUS BLD VENIPUNCTURE: CPT

## 2022-04-09 PROCEDURE — S4991 NICOTINE PATCH NONLEGEND: HCPCS

## 2022-04-09 PROCEDURE — 99233 PR SUBSEQUENT HOSPITAL CARE,LEVL III: ICD-10-PCS | Mod: ,,, | Performed by: STUDENT IN AN ORGANIZED HEALTH CARE EDUCATION/TRAINING PROGRAM

## 2022-04-09 PROCEDURE — 27000207 HC ISOLATION

## 2022-04-09 PROCEDURE — 12000002 HC ACUTE/MED SURGE SEMI-PRIVATE ROOM

## 2022-04-09 PROCEDURE — 94761 N-INVAS EAR/PLS OXIMETRY MLT: CPT

## 2022-04-09 RX ORDER — FLUCONAZOLE 150 MG/1
150 TABLET ORAL DAILY
Status: DISCONTINUED | OUTPATIENT
Start: 2022-04-09 | End: 2022-04-10 | Stop reason: HOSPADM

## 2022-04-09 RX ORDER — HYDROXYZINE HYDROCHLORIDE 25 MG/1
50 TABLET, FILM COATED ORAL 3 TIMES DAILY PRN
Status: DISCONTINUED | OUTPATIENT
Start: 2022-04-09 | End: 2022-04-10 | Stop reason: HOSPADM

## 2022-04-09 RX ADMIN — ACETAMINOPHEN 500 MG: 500 TABLET ORAL at 06:04

## 2022-04-09 RX ADMIN — NICOTINE 1 PATCH: 14 PATCH, EXTENDED RELEASE TRANSDERMAL at 08:04

## 2022-04-09 RX ADMIN — HEPARIN SODIUM 5000 UNITS: 5000 INJECTION INTRAVENOUS; SUBCUTANEOUS at 08:04

## 2022-04-09 RX ADMIN — Medication 6 MG: at 08:04

## 2022-04-09 RX ADMIN — IBUPROFEN 400 MG: 400 TABLET, FILM COATED ORAL at 08:04

## 2022-04-09 RX ADMIN — ALBUTEROL SULFATE 2.5 MG: 2.5 SOLUTION RESPIRATORY (INHALATION) at 07:04

## 2022-04-09 RX ADMIN — HYDROXYZINE HYDROCHLORIDE 50 MG: 25 TABLET ORAL at 03:04

## 2022-04-09 RX ADMIN — ALUMINUM HYDROXIDE, MAGNESIUM HYDROXIDE, AND DIMETHICONE 30 ML: 400; 400; 40 SUSPENSION ORAL at 03:04

## 2022-04-09 RX ADMIN — ALBUTEROL SULFATE 2.5 MG: 2.5 SOLUTION RESPIRATORY (INHALATION) at 08:04

## 2022-04-09 RX ADMIN — FOLIC ACID 1 MG: 1 TABLET ORAL at 08:04

## 2022-04-09 RX ADMIN — METHOCARBAMOL 500 MG: 500 TABLET, FILM COATED ORAL at 08:04

## 2022-04-09 RX ADMIN — SODIUM CHLORIDE SOLN NEBU 3% 4 ML: 3 NEBU SOLN at 01:04

## 2022-04-09 RX ADMIN — ALBUTEROL SULFATE 2.5 MG: 2.5 SOLUTION RESPIRATORY (INHALATION) at 01:04

## 2022-04-09 RX ADMIN — CYANOCOBALAMIN TAB 250 MCG 250 MCG: 250 TAB at 08:04

## 2022-04-09 RX ADMIN — HYDROXYZINE HYDROCHLORIDE 50 MG: 25 TABLET ORAL at 06:04

## 2022-04-09 RX ADMIN — THERA TABS 1 TABLET: TAB at 08:04

## 2022-04-09 RX ADMIN — METHOCARBAMOL 500 MG: 500 TABLET, FILM COATED ORAL at 03:04

## 2022-04-09 RX ADMIN — SODIUM CHLORIDE SOLN NEBU 3% 4 ML: 3 NEBU SOLN at 08:04

## 2022-04-09 RX ADMIN — FLUCONAZOLE 150 MG: 150 TABLET ORAL at 12:04

## 2022-04-09 RX ADMIN — PANTOPRAZOLE SODIUM 40 MG: 40 TABLET, DELAYED RELEASE ORAL at 08:04

## 2022-04-09 RX ADMIN — LIDOCAINE 1 PATCH: 50 PATCH CUTANEOUS at 12:04

## 2022-04-09 RX ADMIN — ALUMINUM HYDROXIDE, MAGNESIUM HYDROXIDE, AND DIMETHICONE 30 ML: 400; 400; 40 SUSPENSION ORAL at 05:04

## 2022-04-09 RX ADMIN — SODIUM CHLORIDE SOLN NEBU 3% 4 ML: 3 NEBU SOLN at 07:04

## 2022-04-09 NOTE — ASSESSMENT & PLAN NOTE
Acute Hypoxic respiratory failure- RESOLVED (patient now on room air)    No baseline home O2 per patient. Presenting with 5 L NC O2. Unclear etiology, though previous documentation suggestive of pleural effusion vs component of PNA. Unclear etiology as patient seems to be volume down on exam. No reported history of heart failure or COPD (though extensive tobacco use history). Etiology may be 2/2 to abdominal distention causing diaphragmatic pressure vs infectious etiology vs pleural effusion d/t third spacing vs infection? CT demonstrating bilateral pleural effusions and lingula consolidation; concerns for possible PNA. 03/29 patient decompensated while on 5L > 10 HFNC > intubation; CXR demonstrating L lung atelectasis / mucus plugging. 03/31 bronchoscopy for mucus evacuation; RCx sent, mild improvement on CXR following. Extubated to NC on 04/01 to BiPAP. Continue weaning supplemental O2 down; challenging given poor cough reflex and copious mucus production.    - Oxygen requirement appears largely 2/2 poor mechanical ventilation d/t deconditioning with component of mucus plugging d/t poor cough reflex  - On O2 currently, BiPAP qhs  - RCx with acinetobacter baumannii , ID following, susceptibility limited to TMP-SMX / Avycaz; meropenem off  - Fluconazole for OSH report of esophageal candidiasis   - Aggressive pulmonary hygiene, CPT, albuterol inhaler, nebulized saline, cough assist  - Repeat CXR demonstrates relative increased lung volumes, resolving atelectasis  - Continuous pulse ox  - Monitor fever curve; given lack of infectious presentation, will defer abx course now  - Lactate wnl  - Monitor BCx, RCx  - Aspiration precaution

## 2022-04-09 NOTE — PLAN OF CARE
CM[facility] DAILY GOALS       A: Awake    RASS: Goal - RASS Goal: 0-->alert and calm  Actual - RASS (Wan Agitation-Sedation Scale): 0-->alert and calm   Restraint necessity: Clinical Justification: Treatment Interference  B: Breathe   SBT: Not intubated   C: Coordinate A & B, analgesics/sedatives   Pain: managed    SAT: Not intubated  D: Delirium   CAM-ICU: Overall CAM-ICU: Negative  E: Early(intubated/ Progressive (non-intubated) Mobility   MOVE Screen: Pass   Activity: Activity Management: Up to bedside commode - L3  FAS: Feeding/Nutrition   Diet order: Diet/Nutrition Received: regular, Specialty Diet/Nutrition Received: other (see comments) (Isosource 1.5 continuos via NGT)  T: Thrombus   DVT prophylaxis: VTE Required Core Measure: (SCDs) Sequential compression device initiated/maintained, Pharmacological prophylaxis initiated/maintained  H: HOB Elevation   Head of Bed (HOB) Positioning: HOB at 20-30 degrees  U: Ulcer Prophylaxis   GI: yes  G: Glucose control   managed    S: Skin   Bathing/Skin Care: bath, partial, back care, dressed/undressed, incontinence care, linen changed  Device Skin Pressure Protection: absorbent pad utilized/changed, adhesive use limited, positioning supports utilized, skin-to-device areas padded, skin-to-skin areas padded  Pressure Reduction Devices: positioning supports utilized, pressure-redistributing mattress utilized  Pressure Reduction Techniques: frequent weight shift encouraged  Skin Protection: adhesive use limited, incontinence pads utilized, skin-to-device areas padded, skin-to-skin areas padded, tubing/devices free from skin contact  B: Bowel Function   diarrhea   I: Indwelling Catheters   Perez necessity: [REMOVED]      Urethral Catheter 03/28/22 1848 Latex-Reason for Continuing Urinary Catheterization: Critically ill in ICU and requiring hourly monitoring of intake/output   CVC necessity: Yes  D: De-escalation Antibiotics   No    Family/Goals of care/Code Status   Code  Status: Full Code    24H Vital Sign Range  Temp:  [97.9 °F (36.6 °C)-98.5 °F (36.9 °C)]   Pulse:  [70-98]   Resp:  [11-31]   BP: ()/(56-79)   SpO2:  [96 %-100 %]      Shift Events   No acute events throughout shift, PRN Atarax and Maalox given ATC for anxiety and abd cramping. Pt educated on adhering to time frames for PRN medications, needs reinforcement. Up to bedside commode with assistance. Stepdown orders in place.    VS and assessment per flow sheet, patient progressing towards goals as tolerated, plan of care reviewed with  Carolina Baldwin and significant other , all concerns addressed, will continue to monitor.    Liam Ham

## 2022-04-09 NOTE — ASSESSMENT & PLAN NOTE
Setting of 3 year history of chronic pancreatitis without treatment and recent admission for gallstone pancreatitis s/p lap jim c/b pSBO, patient has had significantly decreased PO intake in conjunction with regular meth use.     - Nutrition consulted, appreciate assistance  - High calorie/high protein diet as tolerated  - Boost TIDWM

## 2022-04-09 NOTE — SUBJECTIVE & OBJECTIVE
Interval History/Significant Events: No events overnight, still awaiting transfer to  bed    Review of Systems   Constitutional:  Positive for activity change and fatigue. Negative for chills, diaphoresis and fever.   HENT:  Positive for dental problem (all teeth removed previously d/t poor dentition). Negative for sneezing and sore throat.    Eyes:  Negative for pain and visual disturbance.   Respiratory:  Negative for cough, chest tightness, shortness of breath and wheezing.    Cardiovascular:  Negative for chest pain, palpitations and leg swelling.   Gastrointestinal:  Positive for abdominal distention. Negative for abdominal pain, blood in stool, constipation, diarrhea, nausea and vomiting.   Genitourinary:  Negative for dysuria, hematuria and urgency.   Musculoskeletal:  Negative for arthralgias and myalgias.   Skin:  Positive for color change (jaundice). Negative for rash.   Neurological:  Positive for weakness. Negative for tremors, syncope, light-headedness and numbness.   Psychiatric/Behavioral:  Negative for agitation and confusion.    Objective:     Vital Signs (Most Recent):  Temp: 98.1 °F (36.7 °C) (04/08/22 2301)  Pulse: 79 (04/09/22 0407)  Resp: 12 (04/09/22 0407)  BP: 113/76 (04/09/22 0407)  SpO2: 100 % (04/09/22 0407) Vital Signs (24h Range):  Temp:  [97.9 °F (36.6 °C)-98.5 °F (36.9 °C)] 98.1 °F (36.7 °C)  Pulse:  [70-98] 79  Resp:  [11-31] 12  SpO2:  [96 %-100 %] 100 %  BP: ()/(56-79) 113/76   Weight: 22 kg (48 lb 8 oz)  Body mass index is 9.47 kg/m².      Intake/Output Summary (Last 24 hours) at 4/9/2022 0548  Last data filed at 4/8/2022 1700  Gross per 24 hour   Intake 1560 ml   Output 450 ml   Net 1110 ml       Physical Exam  Constitutional:       General: She is not in acute distress.     Appearance: She is ill-appearing. She is not toxic-appearing or diaphoretic.      Comments: Cachetic, temporal wasting, jaundiced, weak, but in no apparent distress   HENT:      Head: Normocephalic.       Comments: BiPAP, NGT     Nose: Nose normal.   Eyes:      General: Scleral icterus present.      Extraocular Movements: Extraocular movements intact.      Pupils: Pupils are equal, round, and reactive to light.   Cardiovascular:      Rate and Rhythm: Regular rhythm. Tachycardia present.      Pulses: Normal pulses.      Heart sounds: Normal heart sounds. No murmur heard.  Pulmonary:      Effort: Pulmonary effort is normal. No respiratory distress.      Breath sounds: Normal breath sounds. No stridor. No wheezing or rales.      Comments: Poor cough reflex, transmitted upper airway noise  Abdominal:      General: Abdomen is flat. There is distension.      Palpations: Abdomen is soft.      Tenderness: There is no abdominal tenderness (improved). There is no guarding or rebound.   Musculoskeletal:         General: No swelling or tenderness. Normal range of motion.      Cervical back: Normal range of motion. No rigidity or tenderness.      Right lower leg: No edema.      Left lower leg: No edema.   Skin:     General: Skin is warm and dry.      Coloration: Skin is jaundiced.      Findings: Bruising and rash (petecchial rash on back) present.   Neurological:      General: No focal deficit present.      Mental Status: She is alert and oriented to person, place, and time. Mental status is at baseline.      Cranial Nerves: No cranial nerve deficit.      Sensory: No sensory deficit.      Motor: No weakness.   Psychiatric:         Mood and Affect: Mood normal.         Behavior: Behavior normal.         Thought Content: Thought content normal.       Vents:  Vent Mode: A/C (04/01/22 0705)  Ventilator Initiated: Yes (03/30/22 0141)  Set Rate: 16 BPM (04/01/22 0705)  Vt Set: 350 mL (04/01/22 0705)  Pressure Support: 0 cmH20 (04/01/22 0705)  PEEP/CPAP: 5 cmH20 (04/01/22 0705)  Oxygen Concentration (%): 30 (04/08/22 0230)  Peak Airway Pressure: 20 cmH2O (04/01/22 0705)  Plateau Pressure: 0 cmH20 (04/01/22 0705)  Total Ve: 9.02 mL  (04/01/22 0705)  Negative Inspiratory Force (cm H2O): 0 (03/30/22 1552)  F/VT Ratio<105 (RSBI): (!) 65.45 (04/01/22 0705)  Lines/Drains/Airways       Peripherally Inserted Central Catheter Line  Duration             PICC Triple Lumen left basilic -- days                  Significant Labs:    CBC/Anemia Profile:  Recent Labs   Lab 04/07/22  1606 04/07/22  2249 04/08/22  1517 04/09/22 0319   WBC  --  7.12 7.11 6.60   HGB  --  6.6* 9.6* 9.1*   HCT  --  22.1* 30.6* 29.8*   PLT  --  500* 492* 481*   MCV  --  108* 101* 103*   RDW  --  19.1* 20.2* 20.2*   FOLATE 7.3  --   --   --    [id number] 1920*  --   --   --         Chemistries:  Recent Labs   Lab 04/07/22  1606 04/08/22  1510 04/09/22 0319   * 139 136   K 3.8 4.3 4.3   CL 95 98 96   CO2 31* 32* 34*   BUN 14 14 13   CREATININE 0.4* 0.4* 0.4*   CALCIUM 8.3* 8.9 8.6*   ALBUMIN 2.5* 2.6* 2.5*   PROT 5.6* 6.2 5.9*   BILITOT 3.5* 3.9* 3.3*   ALKPHOS 985* 1,047* 1,024*   ALT 45* 42 39   AST 39 44* 33   MG 2.4 2.0 2.0   PHOS 4.1 3.3 2.8       All pertinent labs within the past 24 hours have been reviewed.    Significant Imaging:  I have reviewed all pertinent imaging results/findings within the past 24 hours.

## 2022-04-09 NOTE — NURSING
"Pt noted to have non-blanching redness to sacrum and bilateral hip joints. Pt adamantly refusing foams, states "they do not work". Pt educated on need for extra protection to breakdown, pt still refusing and needs reinforcement. No evidence of distress. WCTM  "

## 2022-04-09 NOTE — ASSESSMENT & PLAN NOTE
35 y/o F with PMHx IVDU, polysubstance abuse presenting to [facility] [place] as transfer from [facility] for hepatology evaluation. Initially presented with concerns for acute liver failure (encephalopathy, elevated elevated elevation of LFTs, jaundice) vs pSBO vs sepsis 2/2 abdominal infection. NGT decompression of pSBO with good response, advanced to CLD. Started on broad spectrum abx and tapered. Started on lactulose and rifaximin with good response of encephalopathy, now A&O x 4 without signs of asterixis, tongue fasciculations, hallucinations. Overall etiology may be 2/2 to extensive outpatient Tylenol use (states she used to take handfuls of Tylenol for her initial abdominal pain) vs other drug interactions vs cholestatic injury? Transferred to [facility] [place] for further hepatological evaluation.     - LFTs remain elevated, but interval improvement; synthetic function intact  - Per hepatology recommendations, can defer MRCP and liver biopsy as LFTs appropriately improving  - Hepatology consulted and following; appreciate assistance  - Acute hepatitis panel negative  - HIV negative  - US liver doppler / abdomen did not demonstrate appreciable biliary duct dilatation  - Lactulose 20 BID (titrate to 4-5 BMs per day)  - Rifaximin  - Ceruloplasmin wnl, Copper low  - ELIN, Anti-Sm negative  - PT/INR daily  - Ferritin elevated  - Anti alpha 1 trypsin unremarkable  - Ascites studies unremarkable for SBP  - Trend CMP daily  - Trend CBC daily  - Monitor neurological status

## 2022-04-09 NOTE — ASSESSMENT & PLAN NOTE
Cholelithiasis    Patient family reports that she has had 3+ year history of abdominal pain secondary that they believe is due to her pancreas. Since then, she has had decreasing PO intake and loss of weight due to abdominal discomfort. Family states that patient did not regularly seek treatment for this issue, but used Tylenol for pain. Recently admitted in 03/2022 for gallstone pancreatitis at [facility], s/p lap cholecystectomy, c/b pSBO.    - See Failure to Thrive A/P

## 2022-04-09 NOTE — ASSESSMENT & PLAN NOTE
Noted to be on suboxone through suboxone clinic. Unclear historical path.     - Addiction Psych consult for suboxone; per their report, patient is not on suboxone regularly and not interested at this time  - Urine toxicology negative at this time  - HIV negative  - Hep panel negative

## 2022-04-09 NOTE — HPI
35 y/o F with PMHx IVDU on suboxone, polysubstance use (meth, opioids, tobacco), gastric ulcers initially presented to OSH for increasing jaundice, lethargy, AMS, evaluation for pSBO, increased O2 requirement (3-4 L, no baseline home O2). Was started on broad spectrum Abx at OSH and eventually discontinued. Initially presented oriented x 2 and severe jaundice, elevated ammonia, but normal LFTs? Started on lactulose and rifaximin. NGT decompression at OSH for SBO with good response, advanced to [facility]D. [place] [facility] [place] [facility] [place]

## 2022-04-09 NOTE — ASSESSMENT & PLAN NOTE
Patient family reports that she has had 3+ year history of abdominal pain secondary that they believe is due to her pancreas. Since then, she has had decreasing PO intake and loss of weight due to abdominal discomfort. Family states that patient did not regularly seek treatment for this issue, but used Tylenol for pain. Recently admitted in 03/2022 for gallstone pancreatitis at [facility], s/p lap cholecystectomy, c/b pSBO.     - See Failure to Thrive A/P

## 2022-04-09 NOTE — NURSING TRANSFER
Nursing Transfer Note      4/9/2022     Reason patient is being transferred: Downgrade from ICU    Transfer To: [id number]    Transfer via wheelchair    Transfer with cardiac monitoring    Transported by RN    Medicines sent: Tubed to tube station    Any special needs or follow-up needed: BiPap at bedtime.    Chart send with patient: Yes    Notified: friend, with the patient.    Patient reassessed at: 4/9/2022 @ 1020    Upon arrival to floor: cardiac monitor applied, patient oriented to room, call bell in reach and bed in lowest position

## 2022-04-09 NOTE — ASSESSMENT & PLAN NOTE
Patient with recent h/o pSBO at OSH,with good response to NGT decompression. Advanced to CLD. Initially complaining of RUQ abdominal pain at OSH on initial presentation. Now with mild, diffuse abdominal pain on palpation, but none at rest. Abdomen remains distended. Hypoactive bowel sounds. Repeat KUB / CTAP demonstrating significant bowel dilatation aw/ air-fluid levels c/w SBO. NGT suction d/c on 04/01 after extubation     - Monitor BM, diet tolerance (currently passing BM and tolerating TF)  - General surgery evaluated, no role for surgical intervention  - Bedside US abdomen without note for ascites or other process  - Serial abdominal exams  -advance diet as tolerated

## 2022-04-09 NOTE — PLAN OF CARE
Problem: Adult Inpatient Plan of Care  Goal: Plan of Care Review  Outcome: Ongoing, Progressing  Goal: Patient-Specific Goal (Individualized)  Outcome: Ongoing, Progressing  Goal: Absence of Hospital-Acquired Illness or Injury  Outcome: Ongoing, Progressing  Goal: Optimal Comfort and Wellbeing  Outcome: Ongoing, Progressing  Goal: Readiness for Transition of Care  Outcome: Ongoing, Progressing     Problem: Infection  Goal: Absence of Infection Signs and Symptoms  Outcome: Ongoing, Progressing     Problem: Skin Injury Risk Increased  Goal: Skin Health and Integrity  Outcome: Ongoing, Progressing     Problem: Communication Impairment (Mechanical Ventilation, Invasive)  Goal: Effective Communication  Outcome: Ongoing, Progressing     Problem: Device-Related Complication Risk (Mechanical Ventilation, Invasive)  Goal: Optimal Device Function  Outcome: Ongoing, Progressing     Problem: Nutrition Impairment (Mechanical Ventilation, Invasive)  Goal: Optimal Nutrition Delivery  Outcome: Ongoing, Progressing     Problem: Skin and Tissue Injury (Mechanical Ventilation, Invasive)  Goal: Absence of Device-Related Skin and Tissue Injury  Outcome: Ongoing, Progressing     Problem: Ventilator-Induced Lung Injury (Mechanical Ventilation, Invasive)  Goal: Absence of Ventilator-Induced Lung Injury  Outcome: Ongoing, Progressing     Problem: Device-Related Complication Risk (Artificial Airway)  Goal: Optimal Device Function  Outcome: Ongoing, Progressing     Problem: Skin and Tissue Injury (Artificial Airway)  Goal: Absence of Device-Related Skin or Tissue Injury  Outcome: Ongoing, Progressing     Problem: Noninvasive Ventilation Acute  Goal: Effective Unassisted Ventilation and Oxygenation  Outcome: Ongoing, Progressing     Problem: Fall Injury Risk  Goal: Absence of Fall and Fall-Related Injury  Outcome: Ongoing, Progressing     Problem: Fluid Imbalance (Pneumonia)  Goal: Fluid Balance  Outcome: Ongoing, Progressing     Problem:  Infection (Pneumonia)  Goal: Resolution of Infection Signs and Symptoms  Outcome: Ongoing, Progressing     Problem: Respiratory Compromise (Pneumonia)  Goal: Effective Oxygenation and Ventilation  Outcome: Ongoing, Progressing

## 2022-04-09 NOTE — PROGRESS NOTES
[facility] - Intensive Care ([address] Medicine  Progress Note    Patient Name: Carolina Baldwin  MRN: [id number]  Patient Class: IP- Inpatient   Admission Date: 3/28/2022  Length of Stay: 12 days  Attending Physician: Merly Quezada MD  Primary Care Provider: Primary Doctor No        Subjective:     Principal Problem:Liver injury        HPI:  35 y/o F with PMHx IVDU on suboxone, polysubstance use (meth, opioids, tobacco), gastric ulcers initially presented to OSH for increasing jaundice, lethargy, AMS, evaluation for pSBO, increased O2 requirement (3-4 L, no baseline home O2). Was started on broad spectrum Abx at OSH and eventually discontinued. Initially presented oriented x 2 and severe jaundice, elevated ammonia, but normal LFTs? Started on lactulose and rifaximin. NGT decompression at OSH for SBO with good response, advanced to CLD. Transferred to [facility] for hepatology evaluation.            Overview/Hospital Course:     On presentation to [facility] MICU, patient is overtly jaundiced but with intact mentation (a&o x4). Complains of lower back pain that she experienced from the long transport from OSH to [facility]. Also complains of increasing hunger and thirst (good appetite). Questionable historian, but understands the circumstance, stating she knows she is having new liver problems and is here for the hepatology team to evaluate her. Patient reports having good bowel movements and appropriate urinary voids. Specifically denies chest pain, increased work of breathing, increasing/worsening abdominal pain, fever/chills, nausea, vomiting, constipation, diarrhea, dysuria, suprapubic pain, recent IVDU (currently on suboxone), hallucinations (auditory and visual), tremors, seizures, loss of consciousness, headache, sensory/strength changes, abnormal bleeding.     Patient is from [place], [place]. Lives with boyfriend and 5 children. Of note, she calls her boyfriend her  but is not legally ; her closest relative  is her sister. Substantial IVDU history (meth), non-injectable opioid abuse. Recently stopped smoking history (smoked since 10 y/o). On suboxone per OSH report and per patient. Does not know if any active HIV, hepatitis infection. Family history of CAD, ACS, and cancer (unspecified).         [place] [facility] [place], hepatology evaluation, and SBO. On arrival, patient A&O x 4 with diffuse abdominal pain w/o rebound, on 5 L NC. CTAP demonstrated diffuse bowel air levels and dilatation c/w SBO. Bilateral lung effusions present with concerns for L lingula consolidation. AF VS HDS on presentation to [facility]. Bedside US without amenable ascites pocket for paracentesis. Gen Surg evaluated w/o need for surgical intervention. Started on CAP coverage for pulmonary findings. Lactulose titrated to 4-5 BM/day, rifaximin. Hepatology consulted and work up initiated. Patient desaturated on 5 L NC and subsequently stepped up to 10 L HFNC with eventual intubation. Found to be mucus plugging per CXR (L lung atelectasis with L midline shift). Levophed started during intubation, weaned off. Patient respiratory status improving. Bronch completed for mucus evacuation on 03/31. Extubated to NC with BiPAP on 04/01. Suction turned off to NGT per gen surg recs. Upgraded Cefepime coverage to Meropenem due to GNR+ Rcx while patient was intubated. Per OSH reports, there were previously concerns for esophageal candidiasis, as such, added Fluconazole. Increased airway mucus production, poor cough reflex. SLP swallow eval; diet advanced, NGT removed. Difficulty tolerating comfort flow trials due to poor cough reflex and inspiratory effort 2/2 deconditioning, however intermittent improvement through course; now room air > 95% but prefers to wear NC. Rcx on 04/06 grew acinetobacter baumannii , pending drug sensitivities. ID following, per recommendation, discontinued meropenem without new regimen as patient clinically improving.          Interval History:  Patient doing well.  Complaining of some diffuse abdominal pain but well appearing.    Review of Systems  Objective:     Vital Signs (Most Recent):  Temp: 98.2 °F (36.8 °C) (04/09/22 1537)  Pulse: 95 (04/09/22 1537)  Resp: 18 (04/09/22 1537)  BP: 111/67 (04/09/22 1537)  SpO2: 98 % (04/09/22 1537) Vital Signs (24h Range):  Temp:  [97.9 °F (36.6 °C)-98.2 °F (36.8 °C)] 98.2 °F (36.8 °C)  Pulse:  [70-95] 95  Resp:  [12-21] 18  SpO2:  [97 %-100 %] 98 %  BP: ()/(54-79) 111/67     Weight: 22 kg (48 lb 8 oz)  Body mass index is 9.47 kg/m².    Intake/Output Summary (Last 24 hours) at 4/9/2022 1628  Last data filed at 4/9/2022 1200  Gross per 24 hour   Intake 360 ml   Output --   Net 360 ml      Physical Exam  GENERAL:  No apparent distress. Alert and oriented times three  EYES:  EOMI. Conjunctivae intact  ENT:  Posterior pharynx clear  NECK:  Supple with no lymphadenopathy or thyromegaly  LUNGS:  No respiratory distress. Clear to auscultation bilaterally with good air movement  CARDIAC:  RRR without murmur, rub or gallop  ABDOMEN:  diffusely tender  EXTREMITIES:  Peripheral pulses are 2+. Hands and feet are warm. Good capillary refill in fingers (< 2 seconds). No clubbing, cyanosis or edema  SKIN:  Skin color, texture and turgor normal. No rashes, ulcerations or nodules        Significant Labs: reviewed    Significant Imaging: reviewed      Assessment/Plan:      Severe malnutrition  Setting of 3 year history of chronic pancreatitis without treatment and recent admission for gallstone pancreatitis s/p lap jim c/b pSBO, patient has had significantly decreased PO intake in conjunction with regular meth use.     - Nutrition consulted, appreciate assistance  - High calorie/high protein diet as tolerated  - Boost TIDWM         Esophageal candidiasis    - Continue fluconazole for total course 14-21 days    Chronic biliary pancreatitis  Patient family reports that she has had 3+ year history of  abdominal pain secondary that they believe is due to her pancreas. Since then, she has had decreasing PO intake and loss of weight due to abdominal discomfort. Family states that patient did not regularly seek treatment for this issue, but used Tylenol for pain. Recently admitted in 03/2022 for gallstone pancreatitis at [facility], s/p lap cholecystectomy, c/b pSBO.     - See Failure to Thrive A/P      Mucus plugging of bronchi  Acute Hypoxic respiratory failure- RESOLVED (patient now on room air)    No baseline home O2 per patient. Presenting with 5 L NC O2. Unclear etiology, though previous documentation suggestive of pleural effusion vs component of PNA. Unclear etiology as patient seems to be volume down on exam. No reported history of heart failure or COPD (though extensive tobacco use history). Etiology may be 2/2 to abdominal distention causing diaphragmatic pressure vs infectious etiology vs pleural effusion d/t third spacing vs infection? CT demonstrating bilateral pleural effusions and lingula consolidation; concerns for possible PNA. 03/29 patient decompensated while on 5L > 10 HFNC > intubation; CXR demonstrating L lung atelectasis / mucus plugging. 03/31 bronchoscopy for mucus evacuation; RCx sent, mild improvement on CXR following. Extubated to NC on 04/01 to BiPAP. Continue weaning supplemental O2 down; challenging given poor cough reflex and copious mucus production.    - Oxygen requirement appears largely 2/2 poor mechanical ventilation d/t deconditioning with component of mucus plugging d/t poor cough reflex  - On O2 currently, BiPAP qhs  - RCx with acinetobacter baumannii , ID following, susceptibility limited to TMP-SMX / Avycaz; meropenem off  - Fluconazole for OSH report of esophageal candidiasis   - Aggressive pulmonary hygiene, CPT, albuterol inhaler, nebulized saline, cough assist  - Repeat CXR demonstrates relative increased lung volumes, resolving atelectasis  - Continuous pulse  ox  - Monitor fever curve; given lack of infectious presentation, will defer abx course now  - Lactate wnl  - Monitor BCx, RCx  - Aspiration precaution       CHF (congestive heart failure)  Patient presenting with BNP 1200+, Repeat TTE 03/29, EF 35-40%, diastolic dysfunction. PAP 34 mmHg. No WMA per echo. Initial CXR demonstrated concerns for congestion vs effusion.     - Clinically euvolemic now  - Holding Lasix for now given euvolemic vs hypovolemic state; PRN Lasix to remain net even  - Strict I/Os  - GDMT at future time as tolerated       Paroxysmal tachycardia  Presenting with tachycardia to 120s. Noted in OSH previous progress note and was tempered with BB. Unclear etiology, potentially volume down driving sinus tachycardia as patient has been on NGT suction d/t pSBO with limited PO intake. Also appears to be extremely malnourished. In setting of potential acute pulmonary process, may be related to acute infection vs mucus plugging / atelectasis     - Repeat EKG demonstrating sinus tachycardia and abnormal R wave progression; no previous EKG for comparison  - Cardiac telemetry  - Electrolyte trend, K > 4, Mg > 2  - Will hold trial of IVF bolus until further evaluation regarding volume status (BNP elevated 1000+ on admission)  - TTE revealing EF 35-40%, PAP 34,       Acute hypoxemic respiratory failure  Acute Hypoxic respiratory failure- RESOLVED (patient now on room air)    No baseline home O2 per patient. Presenting with 5 L NC O2. Unclear etiology, though previous documentation suggestive of pleural effusion vs component of PNA. Unclear etiology as patient seems to be volume down on exam. No reported history of heart failure or COPD (though extensive tobacco use history). Etiology may be 2/2 to abdominal distention causing diaphragmatic pressure vs infectious etiology vs pleural effusion d/t third spacing vs infection? CT demonstrating bilateral pleural effusions and lingula consolidation; concerns for  possible PNA. 03/29 patient decompensated while on 5L > 10 HFNC > intubation; CXR demonstrating L lung atelectasis / mucus plugging. 03/31 bronchoscopy for mucus evacuation; RCx sent, mild improvement on CXR following. Extubated to NC on 04/01 to BiPAP. Continue weaning supplemental O2 down; challenging given poor cough reflex and copious mucus production.    - Oxygen requirement appears largely 2/2 poor mechanical ventilation d/t deconditioning with component of mucus plugging d/t poor cough reflex  - On O2 currently, BiPAP qhs  - RCx with acinetobacter baumannii , ID following, susceptibility limited to TMP-SMX / Avycaz; meropenem off  - Fluconazole for OSH report of esophageal candidiasis   - Aggressive pulmonary hygiene, CPT, albuterol inhaler, nebulized saline, cough assist  - Repeat CXR demonstrates relative increased lung volumes, resolving atelectasis  - Continuous pulse ox  - Monitor fever curve; given lack of infectious presentation, will defer abx course now  - Lactate wnl  - Monitor BCx, RCx  - Aspiration precaution    Generalized abdominal pain    Patient with recent h/o pSBO at OSH,with good response to NGT decompression. Advanced to CLD. Initially complaining of RUQ abdominal pain at OSH on initial presentation. Now with mild, diffuse abdominal pain on palpation, but none at rest. Abdomen remains distended. Hypoactive bowel sounds. Repeat KUB / CTAP demonstrating significant bowel dilatation aw/ air-fluid levels c/w SBO. NGT suction d/c on 04/01 after extubation     - Monitor BM, diet tolerance (currently passing BM and tolerating TF)  - General surgery evaluated, no role for surgical intervention  - Bedside US abdomen without note for ascites or other process  - Serial abdominal exams  -advance diet as tolerated    Encephalopathy, metabolic    37 y/o F with PMHx IVDU, polysubstance abuse presenting to [facility] as transfer from [facility] for hepatology evaluation. Initially presented with  concerns for acute liver failure (encephalopathy, elevated elevated elevation of LFTs, jaundice) vs pSBO vs sepsis 2/2 abdominal infection. NGT decompression of pSBO with good response, advanced to CLD. Started on broad spectrum abx and tapered. Started on lactulose and rifaximin with good response of encephalopathy, now A&O x 4 without signs of asterixis, tongue fasciculations, hallucinations. Overall etiology may be 2/2 to extensive outpatient Tylenol use (states she used to take handfuls of Tylenol for her initial abdominal pain) vs other drug interactions vs cholestatic injury? Transferred to [facility] [place] for further hepatological evaluation.     - LFTs remain elevated, but interval improvement; synthetic function intact  - Per hepatology recommendations, can defer MRCP and liver biopsy as LFTs appropriately improving  - Hepatology consulted and following; appreciate assistance  - Acute hepatitis panel negative  - HIV negative  - US liver doppler / abdomen did not demonstrate appreciable biliary duct dilatation  - Lactulose 20 BID (titrate to 4-5 BMs per day)  - Rifaximin  - Ceruloplasmin wnl, Copper low  - ELIN, Anti-Sm negative  - PT/INR daily  - Ferritin elevated  - Anti alpha 1 trypsin unremarkable  - Ascites studies unremarkable for SBP  - Trend CMP daily  - Trend CBC daily  - Monitor neurological status    Opioid use disorder, severe, on maintenance therapy        Polysubstance abuse  Noted to be on suboxone through suboxone clinic. Unclear historical path.     - Addiction Psych consult for suboxone; per their report, patient is not on suboxone regularly and not interested at this time  - Urine toxicology negative at this time  - HIV negative  - Hep panel negative        VTE Risk Mitigation (From admission, onward)         Ordered     heparin (porcine) injection 5,000 Units  Every 12 hours         04/05/22 0853     Place sequential compression device  Until discontinued         03/28/22 1856     IP VTE LOW RISK  PATIENT  Once         03/28/22 1856                Discharge Planning   KEN: 4/12/2022     Code Status: Full Code   Is the patient medically ready for discharge?:     Reason for patient still in hospital (select all that apply): Treatment  Discharge Plan A: Home   Discharge Delays: None known at this time              Merly Quezada MD  Department of Hospital Medicine   [facility] - Intensive Care ([place])

## 2022-04-09 NOTE — ASSESSMENT & PLAN NOTE
Acute hepatic metabolic encephalopathy  Elevated transaminases  Elevated total bilirubin    35 y/o F with PMHx IVDU, polysubstance abuse presenting [place] [facility] [place] [facility] for hepatology evaluation. Initially presented with concerns for acute liver failure (encephalopathy, elevated elevated elevation of LFTs, jaundice) vs pSBO vs sepsis 2/2 abdominal infection. NGT decompression of pSBO with good response, advanced to CLD. Started on broad spectrum abx and tapered. Started on lactulose and rifaximin with good response of encephalopathy, now A&O x 4 without signs of asterixis, tongue fasciculations, hallucinations. Overall etiology may be 2/2 to extensive outpatient Tylenol use (states she used to take handfuls of Tylenol for her initial abdominal pain) vs other drug interactions vs cholestatic injury? Transferred to [facility] [place] hepatological evaluation.    - LFTs remain elevated, but interval improvement; synthetic function intact  - Per hepatology recommendations, can defer MRCP and liver biopsy as LFTs appropriately improving  - Hepatology consulted and following; appreciate assistance  - Acute hepatitis panel negative  - HIV negative  - US liver doppler / abdomen did not demonstrate appreciable biliary duct dilatation  - Lactulose 20 BID (titrate to 4-5 BMs per day)  - Rifaximin  - Ceruloplasmin wnl, Copper low  - ELIN, Anti-Sm negative  - PT/INR daily  - Ferritin elevated  - Anti alpha 1 trypsin unremarkable  - Ascites studies unremarkable for SBP  - Trend CMP daily  - Trend CBC daily  - Monitor neurological status

## 2022-04-09 NOTE — SUBJECTIVE & OBJECTIVE
Interval History:  Patient doing well.  Complaining of some diffuse abdominal pain but well appearing.    Review of Systems  Objective:     Vital Signs (Most Recent):  Temp: 98.2 °F (36.8 °C) (04/09/22 1537)  Pulse: 95 (04/09/22 1537)  Resp: 18 (04/09/22 1537)  BP: 111/67 (04/09/22 1537)  SpO2: 98 % (04/09/22 1537) Vital Signs (24h Range):  Temp:  [97.9 °F (36.6 °C)-98.2 °F (36.8 °C)] 98.2 °F (36.8 °C)  Pulse:  [70-95] 95  Resp:  [12-21] 18  SpO2:  [97 %-100 %] 98 %  BP: ()/(54-79) 111/67     Weight: 22 kg (48 lb 8 oz)  Body mass index is 9.47 kg/m².    Intake/Output Summary (Last 24 hours) at 4/9/2022 1628  Last data filed at 4/9/2022 1200  Gross per 24 hour   Intake 360 ml   Output --   Net 360 ml      Physical Exam  GENERAL:  No apparent distress. Alert and oriented times three  EYES:  EOMI. Conjunctivae intact  ENT:  Posterior pharynx clear  NECK:  Supple with no lymphadenopathy or thyromegaly  LUNGS:  No respiratory distress. Clear to auscultation bilaterally with good air movement  CARDIAC:  RRR without murmur, rub or gallop  ABDOMEN:  diffusely tender  EXTREMITIES:  Peripheral pulses are 2+. Hands and feet are warm. Good capillary refill in fingers (< 2 seconds). No clubbing, cyanosis or edema  SKIN:  Skin color, texture and turgor normal. No rashes, ulcerations or nodules        Significant Labs: reviewed    Significant Imaging: reviewed

## 2022-04-09 NOTE — HOSPITAL COURSE
Ms. Carolina Baldwin is a 36 y.o. female who was admitted to [facility] ICU on 3/28/2022 for Hepatology evaluation of liver failure and an SBO.  CTAP demonstrated diffuse bowel air levels and dilatation consistent with an SBO, as well as bilateral lung effusions present with concerns for a left lingula consolidation.  Gen Surg was consulted, who suggested supportive care with NGT to suction.  She was started on CAP coverage for pneumonia.  Hepatology was consulted and she was started on Lactulose and Rifaximin.   On 3/31, she desaturated on 5L NC and subsequently stepped up to 10L HFNC with eventual intubation and was started on Levophed and Cefepime.  She was found to have mucus plugging per CXR (left lung atelectasis with left midline shift).   Bronch was completed for mucus evacuation on 3/31.  She was extubated to NC with BiPAP on 4/01.  Her respiratory culture from 3/31 returned with  Acinetobacter and ID was consulted.  She was changed to Meropenem which was stopped by ID as she was clinically improving, and the sample was not collected during bronchoscopy procedure after mucus plugging.  She was started on Fluconazole for esophageal candidiasis with plans for 14-21 days of treatment (start date 4/4).  She was SD to  on 4/7 and waited on a bed for 2 days.  She was discharged home with Tramadol and outpatient therapy, as she felt her abdominal pain was manageable at home.  She was told to get follow up labs with her PCP.

## 2022-04-10 VITALS
HEART RATE: 92 BPM | OXYGEN SATURATION: 94 % | SYSTOLIC BLOOD PRESSURE: 99 MMHG | HEIGHT: 60 IN | RESPIRATION RATE: 18 BRPM | TEMPERATURE: 98 F | DIASTOLIC BLOOD PRESSURE: 58 MMHG | BODY MASS INDEX: 9.52 KG/M2 | WEIGHT: 48.5 LBS

## 2022-04-10 PROCEDURE — 99900035 HC TECH TIME PER 15 MIN (STAT)

## 2022-04-10 PROCEDURE — 94761 N-INVAS EAR/PLS OXIMETRY MLT: CPT

## 2022-04-10 PROCEDURE — 99239 HOSP IP/OBS DSCHRG MGMT >30: CPT | Mod: ,,, | Performed by: HOSPITALIST

## 2022-04-10 PROCEDURE — 99239 PR HOSPITAL DISCHARGE DAY,>30 MIN: ICD-10-PCS | Mod: ,,, | Performed by: HOSPITALIST

## 2022-04-10 PROCEDURE — 63600175 PHARM REV CODE 636 W HCPCS

## 2022-04-10 PROCEDURE — 25000242 PHARM REV CODE 250 ALT 637 W/ HCPCS: Performed by: STUDENT IN AN ORGANIZED HEALTH CARE EDUCATION/TRAINING PROGRAM

## 2022-04-10 PROCEDURE — 25000003 PHARM REV CODE 250: Performed by: STUDENT IN AN ORGANIZED HEALTH CARE EDUCATION/TRAINING PROGRAM

## 2022-04-10 PROCEDURE — 97110 THERAPEUTIC EXERCISES: CPT | Mod: CQ

## 2022-04-10 PROCEDURE — 25000003 PHARM REV CODE 250: Performed by: HOSPITALIST

## 2022-04-10 PROCEDURE — 27000221 HC OXYGEN, UP TO 24 HOURS

## 2022-04-10 PROCEDURE — 25000003 PHARM REV CODE 250

## 2022-04-10 RX ORDER — TRAMADOL HYDROCHLORIDE 50 MG/1
50 TABLET ORAL EVERY 6 HOURS PRN
Status: DISCONTINUED | OUTPATIENT
Start: 2022-04-10 | End: 2022-04-10 | Stop reason: HOSPADM

## 2022-04-10 RX ORDER — CYANOCOBALAMIN (VITAMIN B-12) 250 MCG
250 TABLET ORAL DAILY
Qty: 90 TABLET | Refills: 0 | Status: SHIPPED | OUTPATIENT
Start: 2022-04-11 | End: 2022-07-10

## 2022-04-10 RX ORDER — FLUCONAZOLE 150 MG/1
150 TABLET ORAL DAILY
Qty: 15 TABLET | Refills: 0 | Status: SHIPPED | OUTPATIENT
Start: 2022-04-10 | End: 2022-04-25

## 2022-04-10 RX ORDER — PANTOPRAZOLE SODIUM 40 MG/1
40 TABLET, DELAYED RELEASE ORAL DAILY
Qty: 90 TABLET | Refills: 0 | Status: SHIPPED | OUTPATIENT
Start: 2022-04-11 | End: 2022-07-10

## 2022-04-10 RX ORDER — FOLIC ACID 1 MG/1
1 TABLET ORAL DAILY
Qty: 90 TABLET | Refills: 0 | Status: SHIPPED | OUTPATIENT
Start: 2022-04-11 | End: 2022-07-10

## 2022-04-10 RX ORDER — METHOCARBAMOL 750 MG/1
750 TABLET, FILM COATED ORAL 3 TIMES DAILY
Qty: 30 TABLET | Refills: 0 | Status: SHIPPED | OUTPATIENT
Start: 2022-04-10 | End: 2022-04-20

## 2022-04-10 RX ORDER — TRAMADOL HYDROCHLORIDE 50 MG/1
50 TABLET ORAL EVERY 6 HOURS PRN
Qty: 30 TABLET | Refills: 0 | Status: SHIPPED | OUTPATIENT
Start: 2022-04-10

## 2022-04-10 RX ADMIN — METHOCARBAMOL 500 MG: 500 TABLET, FILM COATED ORAL at 09:04

## 2022-04-10 RX ADMIN — FOLIC ACID 1 MG: 1 TABLET ORAL at 09:04

## 2022-04-10 RX ADMIN — ALUMINUM HYDROXIDE, MAGNESIUM HYDROXIDE, AND DIMETHICONE 30 ML: 400; 400; 40 SUSPENSION ORAL at 12:04

## 2022-04-10 RX ADMIN — SODIUM CHLORIDE SOLN NEBU 3% 4 ML: 3 NEBU SOLN at 07:04

## 2022-04-10 RX ADMIN — CYANOCOBALAMIN TAB 250 MCG 250 MCG: 250 TAB at 09:04

## 2022-04-10 RX ADMIN — HYDROXYZINE HYDROCHLORIDE 50 MG: 25 TABLET ORAL at 12:04

## 2022-04-10 RX ADMIN — ACETAMINOPHEN 500 MG: 500 TABLET ORAL at 05:04

## 2022-04-10 RX ADMIN — PANTOPRAZOLE SODIUM 40 MG: 40 TABLET, DELAYED RELEASE ORAL at 09:04

## 2022-04-10 RX ADMIN — ALBUTEROL SULFATE 2.5 MG: 2.5 SOLUTION RESPIRATORY (INHALATION) at 07:04

## 2022-04-10 RX ADMIN — THERA TABS 1 TABLET: TAB at 09:04

## 2022-04-10 RX ADMIN — ONDANSETRON 8 MG: 8 TABLET, ORALLY DISINTEGRATING ORAL at 09:04

## 2022-04-10 RX ADMIN — HEPARIN SODIUM 5000 UNITS: 5000 INJECTION INTRAVENOUS; SUBCUTANEOUS at 09:04

## 2022-04-10 RX ADMIN — HYDROXYZINE HYDROCHLORIDE 50 MG: 25 TABLET ORAL at 09:04

## 2022-04-10 RX ADMIN — TRAMADOL HYDROCHLORIDE 50 MG: 50 TABLET, COATED ORAL at 09:04

## 2022-04-10 NOTE — SUBJECTIVE & OBJECTIVE
Interval History: No acute events overnight.  Tried Tramadol for pain, effective.  Ready to go home.  Will get PCP follow up and outpatient PT/OT.   at bedside, comfortable with plan.  DME ordered    Review of Systems   Constitutional:  Negative for chills, fatigue and fever.   Respiratory:  Negative for cough and shortness of breath.    Cardiovascular:  Negative for chest pain, palpitations and leg swelling.   Gastrointestinal:  Positive for abdominal pain. Negative for diarrhea, nausea and vomiting.   Genitourinary:  Negative for dysuria and urgency.   Neurological:  Negative for dizziness and headaches.   All other systems reviewed and are negative.  Objective:     Vital Signs (Most Recent):  Temp: 98.3 °F (36.8 °C) (04/10/22 0904)  Pulse: 92 (04/10/22 1100)  Resp: 18 (04/10/22 0904)  BP: (!) 99/58 (04/10/22 0904)  SpO2: (!) 94 % (04/10/22 0904)   Vital Signs (24h Range):  Temp:  [97.3 °F (36.3 °C)-98.3 °F (36.8 °C)] 98.3 °F (36.8 °C)  Pulse:  [] 92  Resp:  [14-18] 18  SpO2:  [94 %-99 %] 94 %  BP: ()/(52-67) 99/58     Weight: 22 kg (48 lb 8 oz)  Body mass index is 9.47 kg/m².  No intake or output data in the 24 hours ending 04/10/22 1341   Physical Exam  Constitutional:       Appearance: She is well-developed. She is ill-appearing.      Comments: Temporal wasting   HENT:      Head: Normocephalic and atraumatic.   Cardiovascular:      Rate and Rhythm: Normal rate and regular rhythm.      Heart sounds: No murmur heard.  Pulmonary:      Effort: Pulmonary effort is normal. No respiratory distress.      Breath sounds: Normal breath sounds. No wheezing or rales.   Abdominal:      General: There is no distension.      Palpations: Abdomen is soft.      Tenderness: There is abdominal tenderness (RUQ).   Musculoskeletal:         General: No deformity.   Skin:     General: Skin is warm.   Neurological:      General: No focal deficit present.      Mental Status: She is alert and oriented to person, place,  and time.       Significant Labs: All pertinent labs within the past 24 hours have been reviewed.    Significant Imaging: I have reviewed all pertinent imaging results/findings within the past 24 hours.

## 2022-04-10 NOTE — PT/OT/SLP PROGRESS
Physical Therapy Treatment    Patient Name:  Carolina Baldwin   MRN:  [id number]    Recommendations:     Discharge Recommendations:  home health PT   Discharge Equipment Recommendations: 3-in-1 commode, shower chair   Barriers to discharge: trending medical condition    Assessment:     Carolina Baldwin is a 36 y.o. female admitted with a medical diagnosis of Liver injury.  She presents with the following impairments/functional limitations:  weakness, impaired endurance, impaired self care skills, impaired functional mobilty, gait instability, impaired balance, decreased coordination, decreased upper extremity function, decreased lower extremity function, pain, impaired cardiopulmonary response to activity.    Pt met supine with HOB elevated, and agreeable to PT treatment on this date. Patient performed seated B LE LAQs and marches to tolerance while sitting at the edge of bed. Overall, pt with good participation and fair tolerance allowing rest breaks during periods of fatigue. Session ended due to pt being discharged.    Rehab Prognosis: Good; patient would benefit from acute skilled PT services to address these deficits and reach maximum level of function.    Recent Surgery: * No surgery found *      Plan:     During this hospitalization, patient to be seen 3 x/week to address the identified rehab impairments via gait training, therapeutic activities, therapeutic exercises, neuromuscular re-education and progress toward the following goals:    · Plan of Care Expires:  04/28/22    Subjective     Chief Complaint: LE pain/ soreness  Patient/Family Comments/goals: to return home  Pain/Comfort:  · Pain Rating 1: 6/10  · Location - Side 1: Bilateral  · Location - Orientation 1: generalized  · Location 1: leg  · Pain Addressed 1: Distraction, Reposition  · Pain Rating Post-Intervention 1:  (unrated)      Objective:     Communicated with pt's nurse prior to session.  Patient found supine with telemetry, SCD upon PT entry to  room.     General Precautions: Standard, fall   Orthopedic Precautions:N/A   Braces: N/A  Respiratory Status: Room air     Functional Mobility:  · Bed Mobility:     · Scooting to EOB: stand by assistance  · Supine to Sit: minimum assistance for trunk management  · Balance: supervision      AM-PAC 6 CLICK MOBILITY  Turning over in bed (including adjusting bedclothes, sheets and blankets)?: 3  Sitting down on and standing up from a chair with arms (e.g., wheelchair, bedside commode, etc.): 3  Moving from lying on back to sitting on the side of the bed?: 3  Need to walk in hospital room?: 3  Climbing 3-5 steps with a railing?: 2       Therapeutic Activities and Exercises:   Pt educated on goals of the session, and purpose and benefits of each therapeutic exercise.     Pt instructed in proper performance of seated B LE therex x10 each: LAQs, marches. Rest breaks between reps due to pt fatigue.    Patient left sitting edge of bed with all lines intact, nurse notified and spouse present.    GOALS:   Multidisciplinary Problems     Physical Therapy Goals        Problem: Physical Therapy    Goal Priority Disciplines Outcome Goal Variances Interventions   Physical Therapy Goal     PT, PT/OT Ongoing, Progressing     Description: Goals to be met by: 22    Patient will increase functional independence with mobility by performin. Supine to sit with Stand-by Assistance - not met  2. Sit to stand transfer with Contact Guard Assistance with RW - met 4/5  2a. Sit to stand transfer with supervision - not met  3. Gait  x 100 feet with Contact Guard Assistance using RW. - not met  4. Ascend/descend 3 stair with bilateral Handrails Contact Guard Assistance - not met  5. Lower extremity exercise program x15 reps with assistance as needed to increase tolerance to activities. - not met                   Time Tracking:     PT Received On: 04/10/22  PT Start Time: 1128     PT Stop Time: 1141  PT Total Time (min): 13 min      Billable Minutes: Therapeutic Exercise 13    Treatment Type: Treatment  PT/PTA: PTA     PTA Visit Number: 1     04/10/2022

## 2022-04-10 NOTE — DISCHARGE SUMMARY
[facility] ([address] Medicine  Discharge Summary      Patient Name: Carolina Baldwin  MRN: [id number]  Patient Class: IP- Inpatient  Admission Date: 3/28/2022  Hospital Length of Stay: 13 days  Discharge Date and Time:  04/10/2022 1:45 PM  Attending Physician: Macey Harrison MD   Discharging Provider: Macey Hrarison MD  Primary Care Provider: Primary Doctor [facility] Medicine Team: [facility] HOSP MED B Macey Harrison MD    HPI:   37 y/o F with PMHx IVDU on suboxone, polysubstance use (meth, opioids, tobacco), gastric ulcers initially presented to OSH for increasing jaundice, lethargy, AMS, evaluation for pSBO, increased O2 requirement (3-4 L, no baseline home O2). Was started on broad spectrum Abx at OSH and eventually discontinued. Initially presented oriented x 2 and severe jaundice, elevated ammonia, but normal LFTs? Started on lactulose and rifaximin. NGT decompression at OSH for SBO with good response, advanced to CLD. Transferred to [facility] for hepatology evaluation.            * No surgery found *      Hospital Course:   Ms. Carolina Baldwin is a 36 y.o. female who was admitted to [facility] ICU on 3/28/2022 for Hepatology evaluation of liver failure and an SBO.  CTAP demonstrated diffuse bowel air levels and dilatation consistent with an SBO, as well as bilateral lung effusions present with concerns for a left lingula consolidation.  Gen Surg was consulted, who suggested supportive care with NGT to suction.  She was started on CAP coverage for pneumonia.  Hepatology was consulted and she was started on Lactulose and Rifaximin.   On 3/31, she desaturated on 5L NC and subsequently stepped up to 10L HFNC with eventual intubation and was started on Levophed and Cefepime.  She was found to have mucus plugging per CXR (left lung atelectasis with left midline shift).   Bronch was completed for mucus evacuation on 3/31.  She was extubated to NC with BiPAP on 4/01.  Her respiratory culture  from 3/31 returned with  Acinetobacter and ID was consulted.  She was changed to Meropenem which was stopped by ID as she was clinically improving, and the sample was not collected during bronchoscopy procedure after mucus plugging.  She was started on Fluconazole for esophageal candidiasis with plans for 14-21 days of treatment (start date 4/4).  She was SD to  on 4/7 and waited on a bed for 2 days.  She was discharged home with Tramadol and outpatient therapy, as she felt her abdominal pain was manageable at home.  She was told to get follow up labs with her PCP.       Goals of Care Treatment Preferences:  Code Status: Full Code      Consults:   Consults (From admission, onward)        Status Ordering Provider     Inpatient consult to Infectious Diseases  Once        Provider:  (Not yet assigned)    Completed GILLIES, CONNOR M     Inpatient consult to Registered Dietitian/Nutritionist  Once        Provider:  (Not yet assigned)    Completed KATIE HALEY     Inpatient consult to General Surgery  Once        Provider:  (Not yet assigned)    Completed CRYSTAL LOGAN     Inpatient consult to Psychiatry  Once        Provider:  (Not yet assigned)    Completed KATIE HALEY     Hepatology  Once        Provider:  (Not yet assigned)    Completed KATIE HALEY          No new Assessment & Plan notes have been filed under this hospital service since the last note was generated.  Service: Hospital Medicine    Final Active Diagnoses:    Diagnosis Date Noted POA    PRINCIPAL PROBLEM:  Liver injury [S36.119A] 03/28/2022 Yes    Body mass index (BMI) less than 19 [Z68.1] 04/07/2022 Not Applicable    Macrocytic anemia [D53.9] 04/07/2022 Yes    Severe malnutrition [E43] 04/06/2022 Yes    Esophageal candidiasis [B37.81] 04/05/2022 Yes    Pneumonia of both lower lobes due to infectious organism [J18.9] 04/05/2022 Yes    Chronic biliary pancreatitis [K86.1] 04/04/2022 Yes    Atelectasis [J98.11] 03/30/2022 Yes    Mucus  plugging of bronchi [T17.500A] 03/30/2022 Yes    Bilateral pleural effusion [J90] 03/29/2022 Yes    Acute liver failure [K72.00]  Yes    CHF (congestive heart failure) [I50.9]  Yes    Polysubstance abuse [F19.10] 03/28/2022 Yes     Chronic    Opioid use disorder, severe, on maintenance therapy [F11.20] 03/28/2022 Yes     Chronic    Encephalopathy, metabolic [G93.41] 03/28/2022 Yes    Generalized abdominal pain [R10.84] 03/28/2022 Yes    Failure to thrive in adult [R62.7] 03/28/2022 Yes    Acute hypoxemic respiratory failure [J96.01] 03/28/2022 Yes    Paroxysmal tachycardia [I47.9] 03/28/2022 Yes      Problems Resolved During this Admission:       Discharged Condition: fair    Disposition: Home or Self Care    Follow Up:    Patient Instructions:      COMMODE FOR HOME USE     Order Specific Question Answer Comments   Type: Standard    Height: 5' (1.524 m)    Weight: 22 kg (48 lb 8 oz)    Does patient have medical equipment at home? rollator    Length of need (1-99 months): 99      BATH/SHOWER CHAIR FOR HOME USE     Order Specific Question Answer Comments   Height: 5' (1.524 m)    Weight: 22 kg (48 lb 8 oz)    Does patient have medical equipment at home? rollator    Length of need (1-99 months): 99    Type: With back      Ambulatory referral/consult to Physical/Occupational Therapy   Standing Status: Future   Referral Priority: Routine Referral Type: Physical Medicine   Referral Reason: Specialty Services Required   Number of Visits Requested: 1        Medications:  Reconciled Home Medications:      Medication List      START taking these medications    cyanocobalamin 250 MCG tablet  Commonly known as: VITAMIN B-12  Take 1 tablet (250 mcg total) by mouth once daily.  Start taking on: April 11, 2022     fluconazole 150 MG Tab  Commonly known as: DIFLUCAN  Take 1 tablet (150 mg total) by mouth once daily. for 15 days     folic acid 1 MG tablet  Commonly known as: FOLVITE  Take 1 tablet (1 mg total) by mouth  once daily.  Start taking on: April 11, 2022     methocarbamoL 750 MG Tab  Commonly known as: ROBAXIN  Take 1 tablet (750 mg total) by mouth 3 (three) times daily. for 10 days     multivitamin Tab  Take 1 tablet by mouth once daily.  Start taking on: April 11, 2022     pantoprazole 40 MG tablet  Commonly known as: PROTONIX  Take 1 tablet (40 mg total) by mouth once daily.  Start taking on: April 11, 2022     traMADoL 50 mg tablet  Commonly known as: ULTRAM  Take 1 tablet (50 mg total) by mouth every 6 (six) hours as needed for Pain.        CONTINUE taking these medications    buprenorphine-naloxone 8-2 8-2 mg Subl  Commonly known as: SUBOXONE  Place 2.5 tablets under the tongue once daily.     nicotine 21 mg/24 hr  Commonly known as: NICODERM CQ  Place 1 patch onto the skin once daily.            Indwelling Lines/Drains at time of discharge:   Lines/Drains/Airways     None                 Time spent on the discharge of patient: 35 minutes         Macey Harrison MD  Department of Hospital Medicine  [facility] - Intensive Care ([place])

## 2022-04-10 NOTE — ASSESSMENT & PLAN NOTE
Patient with recent h/o pSBO at OSH,with good response to NGT decompression. Advanced to CLD. Initially complaining of RUQ abdominal pain at OSH on initial presentation. Now with mild, diffuse abdominal pain on palpation, but none at rest. Abdomen remains distended. Hypoactive bowel sounds. Repeat KUB / CTAP demonstrating significant bowel dilatation aw/ air-fluid levels c/w SBO. NGT suction d/c on 04/01 after extubation     - Monitor BM  - General surgery evaluated, no role for surgical intervention  - Bedside US abdomen without note for ascites or other process  - Serial abdominal exams  - Tolerating diet

## 2022-04-10 NOTE — PLAN OF CARE
Problem: Adult Inpatient Plan of Care  Goal: Plan of Care Review  Outcome: Ongoing, Progressing     Problem: Infection  Goal: Absence of Infection Signs and Symptoms  Outcome: Ongoing, Progressing     Problem: Skin Injury Risk Increased  Goal: Skin Health and Integrity  Outcome: Ongoing, Progressing     Problem: Communication Impairment (Mechanical Ventilation, Invasive)  Goal: Effective Communication  Outcome: Ongoing, Progressing    Pt AOx4. No acute events noted during shift. Vitals remained stable. Had c/o pain and discomfort noted, scheduled meds given per MD orders. Q1-2hr safety checks completed. Bed remained low, locked, with all personal items in reach.

## 2022-04-10 NOTE — ASSESSMENT & PLAN NOTE
- LFTs remain elevated, but interval improvement; synthetic function intact  - Per hepatology recommendations, can defer MRCP and liver biopsy as LFTs appropriately improving  - Hepatology consulted and following; appreciate assistance  - Acute hepatitis panel negative  - HIV negative  - US liver doppler / abdomen did not demonstrate appreciable biliary duct dilatation  - Lactulose 20 BID (titrate to 4-5 BMs per day)  - Rifaximin  - Ceruloplasmin wnl, [place]pper low  - ELIN, Anti-Sm negative  - PT/INR daily  - Ferritin elevated  - Anti alpha 1 trypsin unremarkable  - Ascites studies unremarkable for SBP  - Trend CMP daily  - Trend CBC daily  - Monitor neurological status

## 2022-04-10 NOTE — ASSESSMENT & PLAN NOTE
· Body mass index is 9.47 kg/m².  · Encourage PO intake  · High calorie, high protein diet  · Boost TID

## 2022-04-10 NOTE — ASSESSMENT & PLAN NOTE
Patient family reports that she has had 3+ year history of abdominal pain secondary that they believe is due to her pancreas. Since then, she has had decreasing PO intake and loss of weight due to abdominal discomfort. Family states that patient did not regularly seek treatment for this issue, but used Tylenol for pain. Recently admitted in 03/2022 for gallstone pancreatitis at [facility], s/p lap cholecystectomy, c/b pSBO.     - See Failure to Thrive A/P

## 2022-04-10 NOTE — PLAN OF CARE
[facility]  [address]  [place] [address]  Phone: [phone]    Home Health Orders  Face to Face Encounter    Patient Name: Carolina Baldwin  YOB: 1985    PCP: Primary Doctor No   PCP Address: None  PCP Phone Number: None  PCP Fax: None    Encounter Date: 04/10/2022    Admit To Home Health    Diagnoses:  Active Hospital Problems    Diagnosis  POA    *Liver injury [S36.119A]  Yes    Body mass index (BMI) less than 19 [Z68.1]  Not Applicable    Macrocytic anemia [D53.9]  Yes    Severe malnutrition [E43]  Yes    Esophageal candidiasis [B37.81]  Yes    Pneumonia of both lower lobes due to infectious organism [J18.9]  Yes    Chronic biliary pancreatitis [K86.1]  Yes    Atelectasis [J98.11]  Yes    Mucus plugging of bronchi [T17.500A]  Yes    Bilateral pleural effusion [J90]  Yes    Acute liver failure [K72.00]  Yes    CHF (congestive heart failure) [I50.9]  Yes    Polysubstance abuse [F19.10]  Yes     Chronic    Opioid use disorder, severe, on maintenance therapy [F11.20]  Yes     Chronic    Encephalopathy, metabolic [G93.41]  Yes    Generalized abdominal pain [R10.84]  Yes    Failure to thrive in adult [R62.7]  Yes    Acute hypoxemic respiratory failure [J96.01]  Yes    Paroxysmal tachycardia [I47.9]  Yes      Resolved Hospital Problems   No resolved problems to display.       No future appointments.        I have seen and examined this patient face to face today. My clinical findings that support the need for the home health skilled services and home bound status are the following:  Weakness/numbness causing balance and gait disturbance due to Weakness/Debility making it taxing to leave home.      Allergies:  Review of patient's allergies indicates:   Allergen Reactions    Penicillins Shortness Of Breath and Swelling     Tolerated cefepime 3/2022         Code Status:    Code Status: Full Code      Diet: Regular, High protein, high  calorie  Supplement:  1 can Boost (or equivalent) three times a day with meals      Referrals/ Consults     Physical Therapy to evaluate and treat     Occupational Therapy to evaluate and treat     Nutrition to evaluate and recommend diet      to evaluate for community resources/long-range planning      Activities:   activity as tolerated      Wound Care Orders:  no        Nursing:   Agency to admit patient within 24 hours of hospital discharge unless specified on physician order or at patient request    SN to complete comprehensive assessment including routine vital signs. Instruct on disease process and s/s of complications to report to MD. Review/verify medication list sent home with the patient at time of discharge  and instruct patient/caregiver as needed. Frequency may be adjusted depending on start of care date.     Skilled nurse to perform up to 3 visits PRN for symptoms related to diagnosis    Notify MD if SBP > 160 or < 90; DBP > 90 or < 50; HR > 120 or < 50; Temp > 101; O2 < 88%    Ok to schedule additional visits based on staff availability and patient request on consecutive days within the home health episode.      When multiple disciplines ordered:    Start of Care occurs on Sunday - Wednesday schedule remaining discipline evaluations as ordered on separate consecutive days following the start of care.    Thursday SOC -schedule subsequent evaluations Friday and Monday the following week.     Friday - Saturday SOC - schedule subsequent discipline evaluations on consecutive days starting Monday of the following week.      For all post-discharge communication and subsequent orders please contact patient's primary care physician. If unable to reach primary care physician or do not receive response within 30 minutes, please contact [facility] on call for clinical staff order clarification      Medications: Review discharge medications with patient and family and provide education.      Current  Discharge Medication List      START taking these medications    Details   cyanocobalamin (VITAMIN B-12) 250 MCG tablet Take 1 tablet (250 mcg total) by mouth once daily.  Qty: 90 tablet, Refills: 0      fluconazole (DIFLUCAN) 150 MG Tab Take 1 tablet (150 mg total) by mouth once daily. for 15 days  Qty: 15 tablet, Refills: 0      folic acid (FOLVITE) 1 MG tablet Take 1 tablet (1 mg total) by mouth once daily.  Qty: 90 tablet, Refills: 0      methocarbamoL (ROBAXIN) 750 MG Tab Take 1 tablet (750 mg total) by mouth 3 (three) times daily. for 10 days  Qty: 30 tablet, Refills: 0      multivitamin Tab Take 1 tablet by mouth once daily.  Qty: 90 tablet, Refills: 0      pantoprazole (PROTONIX) 40 MG tablet Take 1 tablet (40 mg total) by mouth once daily.  Qty: 90 tablet, Refills: 0      traMADoL (ULTRAM) 50 mg tablet Take 1 tablet (50 mg total) by mouth every 6 (six) hours as needed for Pain.  Qty: 30 tablet, Refills: 0    Comments: Quantity prescribed more than 7 day supply? No         CONTINUE these medications which have NOT CHANGED    Details   buprenorphine-naloxone 8-2 (SUBOXONE) 8-2 mg Subl Place 2.5 tablets under the tongue once daily.      nicotine (NICODERM CQ) 21 mg/24 hr Place 1 patch onto the skin once daily.             I certify that this patient is confined to her home and needs intermittent skilled nursing care, physical therapy and occupational therapy.    Macey Harrison MD  [facility] Medicine

## 2022-04-10 NOTE — PROGRESS NOTES
[facility] - Intensive Care ([address] Medicine  Progress Note    Patient Name: Carolina Baldwin  MRN: [id number]  Patient Class: IP- Inpatient   Admission Date: 3/28/2022  Length of Stay: 13 days  Attending Physician: Macey Harrison MD  Primary Care Provider: Primary Doctor No        Subjective:     Principal Problem:Liver injury        HPI:  37 y/o F with PMHx IVDU on suboxone, polysubstance use (meth, opioids, tobacco), gastric ulcers initially presented to OSH for increasing jaundice, lethargy, AMS, evaluation for pSBO, increased O2 requirement (3-4 L, no baseline home O2). Was started on broad spectrum Abx at OSH and eventually discontinued. Initially presented oriented x 2 and severe jaundice, elevated ammonia, but normal LFTs? Started on lactulose and rifaximin. NGT decompression at OSH for SBO with good response, advanced to CLD. Transferred to [facility] [place] [facility] for hepatology evaluation.            Overview/Hospital Course:  Ms. Carolina Baldwin is a 36 y.o. female who was admitted to [facility] ICU on 3/28/2022 for Hepatology evaluation of liver failure and an SBO.  CTAP demonstrated diffuse bowel air levels and dilatation consistent with an SBO, as well as bilateral lung effusions present with concerns for a left lingula consolidation.  Gen Surg was consulted, who suggested supportive care with NGT to suction.  She was started on CAP coverage for pneumonia.  Hepatology was consulted and she was started on Lactulose and Rifaximin.   On 3/31, she desaturated on 5L NC and subsequently stepped up to 10L HFNC with eventual intubation and was started on Levophed and Cefepime.  She was found to have mucus plugging per CXR (left lung atelectasis with left midline shift).   Bronch was completed for mucus evacuation on 3/31.  She was extubated to NC with BiPAP on 4/01.  Her respiratory culture from 3/31 returned with  Acinetobacter and ID was consulted.  She was changed to Meropenem which was stopped by ID as she  was clinically improving, and the sample was not collected during bronchoscopy procedure after mucus plugging.  She was started on Fluconazole for esophageal candidiasis with plans for 14-21 days of treatment (start date 4/4).  She was SD to  on 4/7 and waited on a bed for 2 days.  She was discharged home with Tramadol and outpatient therapy, as she felt her abdominal pain was manageable at home.  She was told to get follow up labs with her PCP.      Interval History: No acute events overnight.  Tried Tramadol for pain, effective.  Ready to go home.  Will get PCP follow up and outpatient PT/OT.   at bedside, comfortable with plan.  DME ordered    Review of Systems   Constitutional:  Negative for chills, fatigue and fever.   Respiratory:  Negative for cough and shortness of breath.    Cardiovascular:  Negative for chest pain, palpitations and leg swelling.   Gastrointestinal:  Positive for abdominal pain. Negative for diarrhea, nausea and vomiting.   Genitourinary:  Negative for dysuria and urgency.   Neurological:  Negative for dizziness and headaches.   All other systems reviewed and are negative.  Objective:     Vital Signs (Most Recent):  Temp: 98.3 °F (36.8 °C) (04/10/22 0904)  Pulse: 92 (04/10/22 1100)  Resp: 18 (04/10/22 0904)  BP: (!) 99/58 (04/10/22 0904)  SpO2: (!) 94 % (04/10/22 0904)   Vital Signs (24h Range):  Temp:  [97.3 °F (36.3 °C)-98.3 °F (36.8 °C)] 98.3 °F (36.8 °C)  Pulse:  [] 92  Resp:  [14-18] 18  SpO2:  [94 %-99 %] 94 %  BP: ()/(52-67) 99/58     Weight: 22 kg (48 lb 8 oz)  Body mass index is 9.47 kg/m².  No intake or output data in the 24 hours ending 04/10/22 1341   Physical Exam  Constitutional:       Appearance: She is well-developed. She is ill-appearing.      Comments: Temporal wasting   HENT:      Head: Normocephalic and atraumatic.   Cardiovascular:      Rate and Rhythm: Normal rate and regular rhythm.      Heart sounds: No murmur heard.  Pulmonary:      Effort:  Pulmonary effort is normal. No respiratory distress.      Breath sounds: Normal breath sounds. No wheezing or rales.   Abdominal:      General: There is no distension.      Palpations: Abdomen is soft.      Tenderness: There is abdominal tenderness (RUQ).   Musculoskeletal:         General: No deformity.   Skin:     General: Skin is warm.   Neurological:      General: No focal deficit present.      Mental Status: She is alert and oriented to person, place, and time.       Significant Labs: All pertinent labs within the past 24 hours have been reviewed.    Significant Imaging: I have reviewed all pertinent imaging results/findings within the past 24 hours.      Assessment/Plan:      * Liver injury        Macrocytic anemia        Body mass index (BMI) less than 19        Severe malnutrition  Setting of 3 year history of chronic pancreatitis without treatment and recent admission for gallstone pancreatitis s/p lap jim c/b pSBO, patient has had significantly decreased PO intake in conjunction with regular meth use.     - Nutrition consulted, appreciate assistance  - High calorie/high protein diet as tolerated  - Boost TIDWM         Pneumonia of both lower lobes due to infectious organism        Esophageal candidiasis    - Continue fluconazole for total course 14-21 days    Chronic biliary pancreatitis  Patient family reports that she has had 3+ year history of abdominal pain secondary that they believe is due to her pancreas. Since then, she has had decreasing PO intake and loss of weight due to abdominal discomfort. Family states that patient did not regularly seek treatment for this issue, but used Tylenol for pain. Recently admitted in 03/2022 for gallstone pancreatitis at [facility], s/p lap cholecystectomy, c/b pSBO.     - See Failure to Thrive A/P      Mucus plugging of bronchi  Acute Hypoxic respiratory failure- RESOLVED (patient now on room air)    No baseline home O2 per patient. Presenting with 5 L NC  O2. Unclear etiology, though previous documentation suggestive of pleural effusion vs component of PNA. Unclear etiology as patient seems to be volume down on exam. No reported history of heart failure or COPD (though extensive tobacco use history). Etiology may be 2/2 to abdominal distention causing diaphragmatic pressure vs infectious etiology vs pleural effusion d/t third spacing vs infection? CT demonstrating bilateral pleural effusions and lingula consolidation; concerns for possible PNA. 03/29 patient decompensated while on 5L > 10 HFNC > intubation; CXR demonstrating L lung atelectasis / mucus plugging. 03/31 bronchoscopy for mucus evacuation; RCx sent, mild improvement on CXR following. Extubated to NC on 04/01 to BiPAP. Continue weaning supplemental O2 down; challenging given poor cough reflex and copious mucus production.    - Oxygen requirement appears largely 2/2 poor mechanical ventilation d/t deconditioning with component of mucus plugging d/t poor cough reflex  - On O2 currently, BiPAP qhs  - RCx with acinetobacter baumannii , ID following, susceptibility limited to TMP-SMX / Avycaz; meropenem off  - Fluconazole for OSH report of esophageal candidiasis   - Aggressive pulmonary hygiene, CPT, albuterol inhaler, nebulized saline, cough assist  - Repeat CXR demonstrates relative increased lung volumes, resolving atelectasis  - Continuous pulse ox  - Monitor fever curve; given lack of infectious presentation, will defer abx course now  - Lactate wnl  - Monitor BCx, RCx  - Aspiration precaution       Atelectasis        Bilateral pleural effusion        CHF (congestive heart failure)  Patient presenting with BNP 1200+, Repeat TTE 03/29, EF 35-40%, diastolic dysfunction. PAP 34 mmHg. No WMA per echo. Initial CXR demonstrated concerns for congestion vs effusion.     - Clinically euvolemic now  - Holding Lasix for now given euvolemic vs hypovolemic state; PRN Lasix to remain net even  - Strict I/Os  - GDMT  at future time as tolerated       Acute liver failure  · Still with elevated Alk Phos  · LFTs bili trending down      Paroxysmal tachycardia  Presenting with tachycardia to 120s. Noted in OSH previous progress note and was tempered with BB. Unclear etiology, potentially volume down driving sinus tachycardia as patient has been on NGT suction d/t pSBO with limited PO intake. Also appears to be extremely malnourished. In setting of potential acute pulmonary process, may be related to acute infection vs mucus plugging / atelectasis     - Repeat EKG demonstrating sinus tachycardia and abnormal R wave progression; no previous EKG for comparison  - Cardiac telemetry  - Electrolyte trend, K > 4, Mg > 2  - Will hold trial of IVF bolus until further evaluation regarding volume status (BNP elevated 1000+ on admission)  - TTE revealing EF 35-40%, PAP 34,       Acute hypoxemic respiratory failure  Acute Hypoxic respiratory failure- RESOLVED (patient now on room air)    No baseline home O2 per patient. Presenting with 5 L NC O2. Unclear etiology, though previous documentation suggestive of pleural effusion vs component of PNA. Unclear etiology as patient seems to be volume down on exam. No reported history of heart failure or COPD (though extensive tobacco use history). Etiology may be 2/2 to abdominal distention causing diaphragmatic pressure vs infectious etiology vs pleural effusion d/t third spacing vs infection? CT demonstrating bilateral pleural effusions and lingula consolidation; concerns for possible PNA. 03/29 patient decompensated while on 5L > 10 HFNC > intubation; CXR demonstrating L lung atelectasis / mucus plugging. 03/31 bronchoscopy for mucus evacuation; RCx sent, mild improvement on CXR following. Extubated to NC on 04/01 to BiPAP. Continue weaning supplemental O2 down; challenging given poor cough reflex and copious mucus production.    - Oxygen requirement appears largely 2/2 poor mechanical ventilation d/t  deconditioning with component of mucus plugging d/t poor cough reflex  - On O2 currently, BiPAP qhs  - RCx with acinetobacter baumannii , ID following, susceptibility limited to TMP-SMX / Avycaz; meropenem off  - Fluconazole for OSH report of esophageal candidiasis   - Aggressive pulmonary hygiene, CPT, albuterol inhaler, nebulized saline, cough assist  - Repeat CXR demonstrates relative increased lung volumes, resolving atelectasis  - Continuous pulse ox  - Monitor fever curve; given lack of infectious presentation, will defer abx course now  - Lactate wnl  - Monitor BCx, RCx  - Aspiration precaution    Failure to thrive in adult  · Body mass index is 9.47 kg/m².  · Encourage PO intake  · High calorie, high protein diet  · Boost TID      Generalized abdominal pain    Patient with recent h/o pSBO at OSH,with good response to NGT decompression. Advanced to CLD. Initially complaining of RUQ abdominal pain at OSH on initial presentation. Now with mild, diffuse abdominal pain on palpation, but none at rest. Abdomen remains distended. Hypoactive bowel sounds. Repeat KUB / CTAP demonstrating significant bowel dilatation aw/ air-fluid levels c/w SBO. NGT suction d/c on 04/01 after extubation     - Monitor BM  - General surgery evaluated, no role for surgical intervention  - Bedside US abdomen without note for ascites or other process  - Serial abdominal exams  - Tolerating diet    Encephalopathy, metabolic    - LFTs remain elevated, but interval improvement; synthetic function intact  - Per hepatology recommendations, can defer MRCP and liver biopsy as LFTs appropriately improving  - Hepatology consulted and following; appreciate assistance  - Acute hepatitis panel negative  - HIV negative  - US liver doppler / abdomen did not demonstrate appreciable biliary duct dilatation  - Lactulose 20 BID (titrate to 4-5 BMs per day)  - Rifaximin  - Ceruloplasmin wnl, Copper low  - ELIN, Anti-Sm negative  - PT/INR daily  - Ferritin  elevated  - Anti alpha 1 trypsin unremarkable  - Ascites studies unremarkable for SBP  - Trend CMP daily  - Trend CBC daily  - Monitor neurological status    Opioid use disorder, severe, on maintenance therapy  · Previously on Suboxone      Polysubstance abuse  Noted to be on suboxone through suboxone clinic. Unclear historical path.     - Addiction Psych consult for suboxone; per their report, patient is not on suboxone regularly and not interested at this time  - Urine toxicology negative at this time  - HIV negative  - Hep panel negative        VTE Risk Mitigation (From admission, onward)         Ordered     heparin (porcine) injection 5,000 Units  Every 12 hours         04/05/22 0853     Place sequential compression device  Until discontinued         03/28/22 1856     IP VTE LOW RISK PATIENT  Once         03/28/22 1856                Discharge Planning   KEN: 4/10/2022     Code Status: Full Code   Is the patient medically ready for discharge?:     Reason for patient still in hospital (select all that apply): Pending disposition  Discharge Plan A: Home   Discharge Delays: None known at this time              Macey Harrison MD  Department of Hospital Medicine   [facility] - Intensive Care ([place]-[facility])

## 2022-04-12 ENCOUNTER — PATIENT OUTREACH (OUTPATIENT)
Dept: ADMINISTRATIVE | Facility: CLINIC | Age: 37
End: 2022-04-12
Payer: MEDICAID

## 2022-04-12 DIAGNOSIS — K72.10 CHRONIC LIVER FAILURE WITHOUT HEPATIC COMA: Primary | ICD-10-CM

## 2022-04-12 NOTE — PROGRESS NOTES
C3 nurse spoke with Carolina Baldwin   for a TCC post hospital discharge follow up call. The patient reports does not have a scheduled HOSFU appointment. C3 nurse was unable to schedule HOSFU appointment [facility]. Patient advised to call Medicaid access line to set up PCP. Number given to pt  . Pt verbalized understanding

## 2022-04-12 NOTE — PROGRESS NOTES
C3 nurse attempted to contact Carolina Baldwin   for a TCC post hospital discharge follow up call. No answer. Left voicemail with callback information. Also left message with Solomon Baldwin. The patient does not have a scheduled HOSFU appointment.  No PCP

## 2022-04-28 ENCOUNTER — PATIENT OUTREACH (OUTPATIENT)
Dept: ADMINISTRATIVE | Facility: OTHER | Age: 37
End: 2022-04-28
Payer: MEDICAID

## 2022-04-28 NOTE — PROGRESS NOTES
CHW - Outreach Attempt    I left a voicemail message for  attempt to contact Carolina Baldwin regarding: renato on: 4/28/22.

## 2022-05-13 ENCOUNTER — PATIENT OUTREACH (OUTPATIENT)
Dept: ADMINISTRATIVE | Facility: OTHER | Age: 37
End: 2022-05-13
Payer: MEDICAID

## 2022-05-13 NOTE — PROGRESS NOTES
CHW - Outreach Attempt    I left a voicemail message for attempt to contact Carolina Baldwin regarding: initial outreach   on: 5/13/22.

## 2022-05-19 ENCOUNTER — PATIENT OUTREACH (OUTPATIENT)
Dept: ADMINISTRATIVE | Facility: OTHER | Age: 37
End: 2022-05-19
Payer: MEDICAID

## 2022-05-19 NOTE — PROGRESS NOTES
CHW - Unable to Contact    I close episode at this time due to three missed attempts for Carolina Baldwin contact.

## 2022-05-30 DIAGNOSIS — K72.90 LIVER FAILURE WITHOUT HEPATIC COMA, UNSPECIFIED CHRONICITY: Primary | ICD-10-CM
